# Patient Record
Sex: FEMALE | Race: WHITE | NOT HISPANIC OR LATINO | Employment: OTHER | URBAN - METROPOLITAN AREA
[De-identification: names, ages, dates, MRNs, and addresses within clinical notes are randomized per-mention and may not be internally consistent; named-entity substitution may affect disease eponyms.]

---

## 2019-02-14 ENCOUNTER — APPOINTMENT (INPATIENT)
Dept: RADIOLOGY | Facility: HOSPITAL | Age: 79
DRG: 871 | End: 2019-02-14
Payer: MEDICARE

## 2019-02-14 ENCOUNTER — APPOINTMENT (EMERGENCY)
Dept: RADIOLOGY | Facility: HOSPITAL | Age: 79
DRG: 871 | End: 2019-02-14
Attending: EMERGENCY MEDICINE
Payer: MEDICARE

## 2019-02-14 ENCOUNTER — HOSPITAL ENCOUNTER (INPATIENT)
Facility: HOSPITAL | Age: 79
LOS: 7 days | Discharge: NURSING, REHAB OR LONG-TERM CARE | DRG: 871 | End: 2019-02-21
Attending: EMERGENCY MEDICINE | Admitting: INTERNAL MEDICINE
Payer: MEDICARE

## 2019-02-14 ENCOUNTER — APPOINTMENT (INPATIENT)
Dept: CT IMAGING | Facility: HOSPITAL | Age: 79
DRG: 871 | End: 2019-02-14
Payer: MEDICARE

## 2019-02-14 DIAGNOSIS — J18.9 PNEUMONIA OF RIGHT LOWER LOBE DUE TO INFECTIOUS ORGANISM: Primary | ICD-10-CM

## 2019-02-14 DIAGNOSIS — E87.0 HYPERNATREMIA: ICD-10-CM

## 2019-02-14 DIAGNOSIS — R78.81 BACTEREMIA: ICD-10-CM

## 2019-02-14 DIAGNOSIS — J98.01 BRONCHOSPASM: ICD-10-CM

## 2019-02-14 DIAGNOSIS — A41.9 SEPSIS, DUE TO UNSPECIFIED ORGANISM: ICD-10-CM

## 2019-02-14 DIAGNOSIS — R09.02 HYPOXIA: ICD-10-CM

## 2019-02-14 DIAGNOSIS — L89.322 PRESSURE INJURY OF LEFT BUTTOCK, STAGE 2 (HCC): ICD-10-CM

## 2019-02-14 PROBLEM — R65.20 SEVERE SEPSIS (HCC): Status: ACTIVE | Noted: 2019-02-14

## 2019-02-14 PROBLEM — J96.21 ACUTE ON CHRONIC RESPIRATORY FAILURE WITH HYPOXIA (HCC): Status: ACTIVE | Noted: 2019-02-14

## 2019-02-14 PROBLEM — J44.1 COPD WITH ACUTE EXACERBATION (HCC): Status: ACTIVE | Noted: 2019-02-14

## 2019-02-14 PROBLEM — R13.19 OTHER DYSPHAGIA: Status: ACTIVE | Noted: 2019-02-14

## 2019-02-14 PROBLEM — N28.9 RENAL INSUFFICIENCY: Status: ACTIVE | Noted: 2019-02-14

## 2019-02-14 PROBLEM — E11.8 TYPE 2 DIABETES MELLITUS WITH COMPLICATION, WITHOUT LONG-TERM CURRENT USE OF INSULIN (HCC): Status: ACTIVE | Noted: 2019-02-14

## 2019-02-14 PROBLEM — N17.9 AKI (ACUTE KIDNEY INJURY) (HCC): Status: ACTIVE | Noted: 2019-02-14

## 2019-02-14 LAB
ANION GAP SERPL CALCULATED.3IONS-SCNC: 12 MMOL/L (ref 4–13)
APTT PPP: 26 SECONDS (ref 26–38)
ARTERIAL PATENCY WRIST A: YES
BACTERIA UR QL AUTO: ABNORMAL /HPF
BASE EXCESS BLDA CALC-SCNC: 0.5 MMOL/L (ref -2–3)
BASOPHILS # BLD AUTO: 0 THOUSANDS/ΜL (ref 0–0.1)
BASOPHILS NFR BLD AUTO: 0 % (ref 0–2)
BILIRUB UR QL STRIP: NEGATIVE
BNP SERPL-MCNC: 118 PG/ML (ref 1–100)
BUN SERPL-MCNC: 41 MG/DL (ref 7–25)
CALCIUM SERPL-MCNC: 9.5 MG/DL (ref 8.6–10.5)
CHLORIDE SERPL-SCNC: 110 MMOL/L (ref 98–107)
CLARITY UR: ABNORMAL
CO2 SERPL-SCNC: 27 MMOL/L (ref 21–31)
COLOR UR: YELLOW
CREAT SERPL-MCNC: 1.23 MG/DL (ref 0.6–1.2)
EOSINOPHIL # BLD AUTO: 0.1 THOUSAND/ΜL (ref 0–0.61)
EOSINOPHIL NFR BLD AUTO: 1 % (ref 0–5)
ERYTHROCYTE [DISTWIDTH] IN BLOOD BY AUTOMATED COUNT: 14.2 % (ref 11.5–14.5)
FLUAV AG SPEC QL IA: NEGATIVE
FLUAV AG SPEC QL: NOT DETECTED
FLUBV AG SPEC QL IA: NEGATIVE
FLUBV AG SPEC QL: NOT DETECTED
GFR SERPL CREATININE-BSD FRML MDRD: 42 ML/MIN/1.73SQ M
GLUCOSE SERPL-MCNC: 192 MG/DL (ref 65–99)
GLUCOSE SERPL-MCNC: 268 MG/DL (ref 65–140)
GLUCOSE SERPL-MCNC: 284 MG/DL (ref 65–140)
GLUCOSE UR STRIP-MCNC: NEGATIVE MG/DL
HCO3 BLDA-SCNC: 24 MMOL/L (ref 22–28)
HCT VFR BLD AUTO: 34.5 % (ref 42–47)
HGB BLD-MCNC: 11 G/DL (ref 12–16)
HGB UR QL STRIP.AUTO: ABNORMAL
INR PPP: 1.22 (ref 0.9–1.5)
KETONES UR STRIP-MCNC: NEGATIVE MG/DL
L PNEUMO1 AG UR QL IA.RAPID: NEGATIVE
LACTATE SERPL-SCNC: 1.7 MMOL/L (ref 0.5–2)
LACTATE SERPL-SCNC: 2.5 MMOL/L (ref 0.5–2)
LACTATE SERPL-SCNC: 2.5 MMOL/L (ref 0.5–2)
LEUKOCYTE ESTERASE UR QL STRIP: ABNORMAL
LYMPHOCYTES # BLD AUTO: 1.1 THOUSANDS/ΜL (ref 0.6–4.47)
LYMPHOCYTES NFR BLD AUTO: 9 % (ref 21–51)
MCH RBC QN AUTO: 32.7 PG (ref 26–34)
MCHC RBC AUTO-ENTMCNC: 31.8 G/DL (ref 31–37)
MCV RBC AUTO: 103 FL (ref 81–99)
MONOCYTES # BLD AUTO: 1.1 THOUSAND/ΜL (ref 0.17–1.22)
MONOCYTES NFR BLD AUTO: 9 % (ref 2–12)
NASAL CANNULA: 3.5
NEUTROPHILS # BLD AUTO: 10 THOUSANDS/ΜL (ref 1.4–6.5)
NEUTS SEG NFR BLD AUTO: 81 % (ref 42–75)
NITRITE UR QL STRIP: POSITIVE
NON-SQ EPI CELLS URNS QL MICRO: ABNORMAL /HPF
NRBC BLD AUTO-RTO: 0 /100 WBCS
O2 CT BLDA-SCNC: 15.8 ML/DL
OTHER STN SPEC: ABNORMAL
OXYHGB MFR BLDA: 92.1 % (ref 94–100)
PCO2 BLDA: 36 MM HG (ref 35–45)
PH BLDA: 7.44 [PH] (ref 7.35–7.45)
PH UR STRIP.AUTO: 5.5 [PH] (ref 5–8)
PLATELET # BLD AUTO: 209 THOUSANDS/UL (ref 149–390)
PMV BLD AUTO: 9.7 FL (ref 8.6–11.7)
PO2 BLDA: 64 MM HG (ref 80–100)
POTASSIUM SERPL-SCNC: 4 MMOL/L (ref 3.5–5.5)
PROCALCITONIN SERPL-MCNC: 0.92 NG/ML
PROT UR STRIP-MCNC: NEGATIVE MG/DL
PROTHROMBIN TIME: 14.2 SECONDS (ref 10.2–13)
RBC # BLD AUTO: 3.36 MILLION/UL (ref 3.9–5.2)
RBC #/AREA URNS AUTO: ABNORMAL /HPF
RSV B RNA SPEC QL NAA+PROBE: NOT DETECTED
S PNEUM AG UR QL: NEGATIVE
SODIUM SERPL-SCNC: 149 MMOL/L (ref 134–143)
SP GR UR STRIP.AUTO: 1.02 (ref 1–1.03)
SPECIMEN SOURCE: ABNORMAL
TROPONIN I SERPL-MCNC: 0.04 NG/ML
TROPONIN I SERPL-MCNC: 0.08 NG/ML
URATE CRY URNS QL MICRO: ABNORMAL /HPF
UROBILINOGEN UR QL STRIP.AUTO: 0.2 E.U./DL
WBC # BLD AUTO: 12.3 THOUSAND/UL (ref 4.8–10.8)
WBC #/AREA URNS AUTO: ABNORMAL /HPF

## 2019-02-14 PROCEDURE — 82805 BLOOD GASES W/O2 SATURATION: CPT | Performed by: EMERGENCY MEDICINE

## 2019-02-14 PROCEDURE — 83605 ASSAY OF LACTIC ACID: CPT | Performed by: NURSE PRACTITIONER

## 2019-02-14 PROCEDURE — 84484 ASSAY OF TROPONIN QUANT: CPT | Performed by: NURSE PRACTITIONER

## 2019-02-14 PROCEDURE — 85610 PROTHROMBIN TIME: CPT | Performed by: EMERGENCY MEDICINE

## 2019-02-14 PROCEDURE — 87077 CULTURE AEROBIC IDENTIFY: CPT | Performed by: EMERGENCY MEDICINE

## 2019-02-14 PROCEDURE — 36600 WITHDRAWAL OF ARTERIAL BLOOD: CPT

## 2019-02-14 PROCEDURE — 72220 X-RAY EXAM SACRUM TAILBONE: CPT

## 2019-02-14 PROCEDURE — 87040 BLOOD CULTURE FOR BACTERIA: CPT | Performed by: EMERGENCY MEDICINE

## 2019-02-14 PROCEDURE — 87186 SC STD MICRODIL/AGAR DIL: CPT | Performed by: EMERGENCY MEDICINE

## 2019-02-14 PROCEDURE — 87186 SC STD MICRODIL/AGAR DIL: CPT | Performed by: NURSE PRACTITIONER

## 2019-02-14 PROCEDURE — 71045 X-RAY EXAM CHEST 1 VIEW: CPT

## 2019-02-14 PROCEDURE — 82948 REAGENT STRIP/BLOOD GLUCOSE: CPT

## 2019-02-14 PROCEDURE — 70450 CT HEAD/BRAIN W/O DYE: CPT

## 2019-02-14 PROCEDURE — 83880 ASSAY OF NATRIURETIC PEPTIDE: CPT | Performed by: EMERGENCY MEDICINE

## 2019-02-14 PROCEDURE — 87205 SMEAR GRAM STAIN: CPT | Performed by: NURSE PRACTITIONER

## 2019-02-14 PROCEDURE — 93005 ELECTROCARDIOGRAM TRACING: CPT

## 2019-02-14 PROCEDURE — 96375 TX/PRO/DX INJ NEW DRUG ADDON: CPT

## 2019-02-14 PROCEDURE — 85730 THROMBOPLASTIN TIME PARTIAL: CPT | Performed by: EMERGENCY MEDICINE

## 2019-02-14 PROCEDURE — 87077 CULTURE AEROBIC IDENTIFY: CPT | Performed by: NURSE PRACTITIONER

## 2019-02-14 PROCEDURE — 80048 BASIC METABOLIC PNL TOTAL CA: CPT | Performed by: EMERGENCY MEDICINE

## 2019-02-14 PROCEDURE — 99285 EMERGENCY DEPT VISIT HI MDM: CPT

## 2019-02-14 PROCEDURE — 87449 NOS EACH ORGANISM AG IA: CPT | Performed by: NURSE PRACTITIONER

## 2019-02-14 PROCEDURE — 87070 CULTURE OTHR SPECIMN AEROBIC: CPT | Performed by: NURSE PRACTITIONER

## 2019-02-14 PROCEDURE — 87631 RESP VIRUS 3-5 TARGETS: CPT | Performed by: EMERGENCY MEDICINE

## 2019-02-14 PROCEDURE — 84484 ASSAY OF TROPONIN QUANT: CPT | Performed by: EMERGENCY MEDICINE

## 2019-02-14 PROCEDURE — 83605 ASSAY OF LACTIC ACID: CPT | Performed by: EMERGENCY MEDICINE

## 2019-02-14 PROCEDURE — 96365 THER/PROPH/DIAG IV INF INIT: CPT

## 2019-02-14 PROCEDURE — 36415 COLL VENOUS BLD VENIPUNCTURE: CPT | Performed by: EMERGENCY MEDICINE

## 2019-02-14 PROCEDURE — 85025 COMPLETE CBC W/AUTO DIFF WBC: CPT | Performed by: EMERGENCY MEDICINE

## 2019-02-14 PROCEDURE — 87086 URINE CULTURE/COLONY COUNT: CPT | Performed by: NURSE PRACTITIONER

## 2019-02-14 PROCEDURE — 81001 URINALYSIS AUTO W/SCOPE: CPT | Performed by: NURSE PRACTITIONER

## 2019-02-14 PROCEDURE — 94640 AIRWAY INHALATION TREATMENT: CPT

## 2019-02-14 PROCEDURE — 94760 N-INVAS EAR/PLS OXIMETRY 1: CPT

## 2019-02-14 PROCEDURE — 87081 CULTURE SCREEN ONLY: CPT | Performed by: NURSE PRACTITIONER

## 2019-02-14 PROCEDURE — 87147 CULTURE TYPE IMMUNOLOGIC: CPT | Performed by: NURSE PRACTITIONER

## 2019-02-14 PROCEDURE — 84145 PROCALCITONIN (PCT): CPT | Performed by: NURSE PRACTITIONER

## 2019-02-14 PROCEDURE — 99223 1ST HOSP IP/OBS HIGH 75: CPT | Performed by: HOSPITALIST

## 2019-02-14 RX ORDER — AMMONIUM LACTATE 12 G/100G
CREAM TOPICAL 2 TIMES DAILY PRN
Status: DISCONTINUED | OUTPATIENT
Start: 2019-02-14 | End: 2019-02-14 | Stop reason: CLARIF

## 2019-02-14 RX ORDER — INSULIN GLARGINE 100 [IU]/ML
50 INJECTION, SOLUTION SUBCUTANEOUS
COMMUNITY
End: 2019-03-13 | Stop reason: HOSPADM

## 2019-02-14 RX ORDER — HEPARIN SODIUM 5000 [USP'U]/ML
5000 INJECTION, SOLUTION INTRAVENOUS; SUBCUTANEOUS EVERY 8 HOURS SCHEDULED
Status: DISCONTINUED | OUTPATIENT
Start: 2019-02-14 | End: 2019-02-21 | Stop reason: HOSPADM

## 2019-02-14 RX ORDER — ISOSORBIDE DINITRATE 10 MG/1
30 TABLET ORAL
Status: DISCONTINUED | OUTPATIENT
Start: 2019-02-15 | End: 2019-02-21 | Stop reason: HOSPADM

## 2019-02-14 RX ORDER — METHYLPREDNISOLONE SODIUM SUCCINATE 125 MG/2ML
125 INJECTION, POWDER, LYOPHILIZED, FOR SOLUTION INTRAMUSCULAR; INTRAVENOUS ONCE
Status: COMPLETED | OUTPATIENT
Start: 2019-02-14 | End: 2019-02-14

## 2019-02-14 RX ORDER — ALBUTEROL SULFATE 2.5 MG/3ML
2.5 SOLUTION RESPIRATORY (INHALATION) EVERY 6 HOURS PRN
COMMUNITY

## 2019-02-14 RX ORDER — AMMONIUM LACTATE 12 G/100G
CREAM TOPICAL AS NEEDED
COMMUNITY
End: 2019-03-13 | Stop reason: HOSPADM

## 2019-02-14 RX ORDER — ASPIRIN 81 MG/1
81 TABLET, CHEWABLE ORAL ONCE
Status: COMPLETED | OUTPATIENT
Start: 2019-02-14 | End: 2019-02-14

## 2019-02-14 RX ORDER — LANOLIN ALCOHOL/MO/W.PET/CERES
325 CREAM (GRAM) TOPICAL
COMMUNITY
End: 2019-03-13 | Stop reason: HOSPADM

## 2019-02-14 RX ORDER — LORAZEPAM 0.5 MG/1
0.5 TABLET ORAL EVERY 8 HOURS PRN
Status: DISCONTINUED | OUTPATIENT
Start: 2019-02-14 | End: 2019-02-21 | Stop reason: HOSPADM

## 2019-02-14 RX ORDER — ATORVASTATIN CALCIUM 10 MG/1
10 TABLET, FILM COATED ORAL DAILY
Status: DISCONTINUED | OUTPATIENT
Start: 2019-02-14 | End: 2019-02-21 | Stop reason: HOSPADM

## 2019-02-14 RX ORDER — ALBUTEROL SULFATE 2.5 MG/3ML
2.5 SOLUTION RESPIRATORY (INHALATION)
Status: DISCONTINUED | OUTPATIENT
Start: 2019-02-14 | End: 2019-02-14

## 2019-02-14 RX ORDER — ALBUTEROL SULFATE 2.5 MG/3ML
2.5 SOLUTION RESPIRATORY (INHALATION) EVERY 4 HOURS PRN
Status: DISCONTINUED | OUTPATIENT
Start: 2019-02-14 | End: 2019-02-21 | Stop reason: HOSPADM

## 2019-02-14 RX ORDER — FERROUS SULFATE 325(65) MG
325 TABLET ORAL
Status: DISCONTINUED | OUTPATIENT
Start: 2019-02-14 | End: 2019-02-21 | Stop reason: HOSPADM

## 2019-02-14 RX ORDER — FUROSEMIDE 20 MG/1
20 TABLET ORAL 2 TIMES DAILY
COMMUNITY
End: 2019-02-21 | Stop reason: HOSPADM

## 2019-02-14 RX ORDER — SERTRALINE HYDROCHLORIDE 25 MG/1
50 TABLET, FILM COATED ORAL DAILY
Status: DISCONTINUED | OUTPATIENT
Start: 2019-02-14 | End: 2019-02-21 | Stop reason: HOSPADM

## 2019-02-14 RX ORDER — ALBUTEROL SULFATE 2.5 MG/3ML
5 SOLUTION RESPIRATORY (INHALATION) ONCE
Status: COMPLETED | OUTPATIENT
Start: 2019-02-14 | End: 2019-02-14

## 2019-02-14 RX ORDER — INSULIN GLARGINE 100 [IU]/ML
46 INJECTION, SOLUTION SUBCUTANEOUS
Status: DISCONTINUED | OUTPATIENT
Start: 2019-02-14 | End: 2019-02-14

## 2019-02-14 RX ORDER — ASPIRIN 81 MG/1
81 TABLET ORAL DAILY
COMMUNITY
End: 2019-03-13 | Stop reason: HOSPADM

## 2019-02-14 RX ORDER — AMMONIUM LACTATE 12 G/100G
LOTION TOPICAL 2 TIMES DAILY PRN
Status: DISCONTINUED | OUTPATIENT
Start: 2019-02-14 | End: 2019-02-21 | Stop reason: HOSPADM

## 2019-02-14 RX ORDER — BUTALBITAL/ASPIRIN/CAFFEINE 50-325-40
1 CAPSULE ORAL EVERY 4 HOURS PRN
COMMUNITY
End: 2019-03-13 | Stop reason: HOSPADM

## 2019-02-14 RX ORDER — AMLODIPINE BESYLATE 5 MG/1
10 TABLET ORAL DAILY
Status: DISCONTINUED | OUTPATIENT
Start: 2019-02-15 | End: 2019-02-21 | Stop reason: HOSPADM

## 2019-02-14 RX ORDER — METHYLPREDNISOLONE SODIUM SUCCINATE 40 MG/ML
40 INJECTION, POWDER, LYOPHILIZED, FOR SOLUTION INTRAMUSCULAR; INTRAVENOUS EVERY 8 HOURS SCHEDULED
Status: DISCONTINUED | OUTPATIENT
Start: 2019-02-14 | End: 2019-02-15

## 2019-02-14 RX ORDER — LISINOPRIL 30 MG/1
30 TABLET ORAL DAILY
COMMUNITY
End: 2019-02-21 | Stop reason: HOSPADM

## 2019-02-14 RX ORDER — MEGESTROL ACETATE 40 MG/ML
20 SUSPENSION ORAL DAILY
COMMUNITY

## 2019-02-14 RX ORDER — GABAPENTIN 100 MG/1
100 CAPSULE ORAL 2 TIMES DAILY
Status: DISCONTINUED | OUTPATIENT
Start: 2019-02-14 | End: 2019-02-21 | Stop reason: HOSPADM

## 2019-02-14 RX ORDER — MEGESTROL ACETATE 40 MG/ML
200 SUSPENSION ORAL DAILY
Status: DISCONTINUED | OUTPATIENT
Start: 2019-02-14 | End: 2019-02-21 | Stop reason: HOSPADM

## 2019-02-14 RX ORDER — SENNOSIDES 8.6 MG
1 TABLET ORAL DAILY
Status: DISCONTINUED | OUTPATIENT
Start: 2019-02-14 | End: 2019-02-21 | Stop reason: HOSPADM

## 2019-02-14 RX ORDER — LORAZEPAM 0.5 MG/1
TABLET ORAL EVERY 8 HOURS PRN
COMMUNITY
End: 2019-03-13 | Stop reason: HOSPADM

## 2019-02-14 RX ORDER — ISOSORBIDE DINITRATE 30 MG/1
30 TABLET ORAL 4 TIMES DAILY
COMMUNITY
End: 2019-03-13 | Stop reason: HOSPADM

## 2019-02-14 RX ORDER — GABAPENTIN 100 MG/1
100 CAPSULE ORAL 3 TIMES DAILY
COMMUNITY
End: 2019-03-13 | Stop reason: HOSPADM

## 2019-02-14 RX ORDER — ACETAMINOPHEN 325 MG/1
650 TABLET ORAL EVERY 6 HOURS PRN
Status: DISCONTINUED | OUTPATIENT
Start: 2019-02-14 | End: 2019-02-21 | Stop reason: HOSPADM

## 2019-02-14 RX ORDER — AMLODIPINE BESYLATE 10 MG/1
10 TABLET ORAL DAILY
COMMUNITY
End: 2019-03-13 | Stop reason: HOSPADM

## 2019-02-14 RX ORDER — ASPIRIN 81 MG/1
81 TABLET ORAL DAILY
Status: DISCONTINUED | OUTPATIENT
Start: 2019-02-14 | End: 2019-02-21 | Stop reason: HOSPADM

## 2019-02-14 RX ORDER — POLYETHYLENE GLYCOL 3350 17 G/17G
17 POWDER, FOR SOLUTION ORAL DAILY
Status: DISCONTINUED | OUTPATIENT
Start: 2019-02-14 | End: 2019-02-16 | Stop reason: CLARIF

## 2019-02-14 RX ORDER — POLYETHYLENE GLYCOL 3350 17 G/17G
17 POWDER, FOR SOLUTION ORAL DAILY
COMMUNITY
End: 2019-02-21 | Stop reason: HOSPADM

## 2019-02-14 RX ORDER — ATORVASTATIN CALCIUM 10 MG/1
10 TABLET, FILM COATED ORAL DAILY
COMMUNITY
End: 2019-03-13 | Stop reason: HOSPADM

## 2019-02-14 RX ORDER — FAMOTIDINE 20 MG/1
20 TABLET, FILM COATED ORAL 2 TIMES DAILY
COMMUNITY
End: 2019-03-13 | Stop reason: HOSPADM

## 2019-02-14 RX ORDER — HYDROCODONE BITARTRATE AND ACETAMINOPHEN 5; 325 MG/1; MG/1
1 TABLET ORAL EVERY 6 HOURS PRN
COMMUNITY
End: 2019-02-21 | Stop reason: HOSPADM

## 2019-02-14 RX ORDER — SENNA PLUS 8.6 MG/1
1 TABLET ORAL DAILY
COMMUNITY

## 2019-02-14 RX ORDER — NICOTINE POLACRILEX 4 MG
15 LOZENGE BUCCAL ONCE
COMMUNITY
End: 2019-02-21 | Stop reason: HOSPADM

## 2019-02-14 RX ORDER — SODIUM CHLORIDE 9 MG/ML
125 INJECTION, SOLUTION INTRAVENOUS CONTINUOUS
Status: DISCONTINUED | OUTPATIENT
Start: 2019-02-14 | End: 2019-02-15

## 2019-02-14 RX ORDER — ACETAMINOPHEN 325 MG/1
650 TABLET ORAL EVERY 6 HOURS PRN
COMMUNITY

## 2019-02-14 RX ORDER — FAMOTIDINE 20 MG/1
20 TABLET, FILM COATED ORAL 2 TIMES DAILY
Status: DISCONTINUED | OUTPATIENT
Start: 2019-02-14 | End: 2019-02-16

## 2019-02-14 RX ORDER — DIPHENOXYLATE HYDROCHLORIDE AND ATROPINE SULFATE 2.5; .025 MG/1; MG/1
1 TABLET ORAL DAILY
COMMUNITY

## 2019-02-14 RX ORDER — HYDROCODONE BITARTRATE AND ACETAMINOPHEN 5; 325 MG/1; MG/1
1 TABLET ORAL EVERY 6 HOURS PRN
Status: DISCONTINUED | OUTPATIENT
Start: 2019-02-14 | End: 2019-02-21 | Stop reason: HOSPADM

## 2019-02-14 RX ADMIN — Medication: at 12:58

## 2019-02-14 RX ADMIN — PIPERACILLIN SODIUM AND TAZOBACTAM SODIUM 2.25 G: 2; .25 INJECTION, POWDER, LYOPHILIZED, FOR SOLUTION INTRAVENOUS at 21:50

## 2019-02-14 RX ADMIN — VANCOMYCIN HYDROCHLORIDE 750 MG: 1 INJECTION, POWDER, LYOPHILIZED, FOR SOLUTION INTRAVENOUS at 22:50

## 2019-02-14 RX ADMIN — SODIUM CHLORIDE 125 ML/HR: 9 INJECTION, SOLUTION INTRAVENOUS at 12:30

## 2019-02-14 RX ADMIN — METHYLPREDNISOLONE SODIUM SUCCINATE 125 MG: 125 INJECTION, POWDER, FOR SOLUTION INTRAMUSCULAR; INTRAVENOUS at 08:20

## 2019-02-14 RX ADMIN — ASPIRIN 81 MG 81 MG: 81 TABLET ORAL at 09:12

## 2019-02-14 RX ADMIN — INSULIN LISPRO 3 UNITS: 100 INJECTION, SOLUTION INTRAVENOUS; SUBCUTANEOUS at 13:05

## 2019-02-14 RX ADMIN — INSULIN LISPRO 2 UNITS: 100 INJECTION, SOLUTION INTRAVENOUS; SUBCUTANEOUS at 22:01

## 2019-02-14 RX ADMIN — ANORECTAL OINTMENT 1 APPLICATION: 15.7; .44; 24; 20.6 OINTMENT TOPICAL at 12:58

## 2019-02-14 RX ADMIN — ALBUTEROL SULFATE 5 MG: 2.5 SOLUTION RESPIRATORY (INHALATION) at 08:13

## 2019-02-14 RX ADMIN — HEPARIN SODIUM 5000 UNITS: 5000 INJECTION INTRAVENOUS; SUBCUTANEOUS at 22:21

## 2019-02-14 RX ADMIN — HEPARIN SODIUM 5000 UNITS: 5000 INJECTION INTRAVENOUS; SUBCUTANEOUS at 13:30

## 2019-02-14 RX ADMIN — METHYLPREDNISOLONE SODIUM SUCCINATE 40 MG: 40 INJECTION, POWDER, FOR SOLUTION INTRAMUSCULAR; INTRAVENOUS at 22:21

## 2019-02-14 RX ADMIN — VANCOMYCIN HYDROCHLORIDE 1250 MG: 1 INJECTION, POWDER, LYOPHILIZED, FOR SOLUTION INTRAVENOUS at 08:24

## 2019-02-14 RX ADMIN — SODIUM CHLORIDE 125 ML/HR: 9 INJECTION, SOLUTION INTRAVENOUS at 21:49

## 2019-02-14 RX ADMIN — PIPERACILLIN SODIUM AND TAZOBACTAM SODIUM 3.38 G: 3; .375 INJECTION, POWDER, LYOPHILIZED, FOR SOLUTION INTRAVENOUS at 08:41

## 2019-02-14 RX ADMIN — TIOTROPIUM BROMIDE 18 MCG: 18 CAPSULE ORAL; RESPIRATORY (INHALATION) at 12:59

## 2019-02-14 RX ADMIN — PIPERACILLIN SODIUM AND TAZOBACTAM SODIUM 2.25 G: 2; .25 INJECTION, POWDER, LYOPHILIZED, FOR SOLUTION INTRAVENOUS at 14:10

## 2019-02-14 RX ADMIN — SODIUM CHLORIDE 1000 ML: 0.9 INJECTION, SOLUTION INTRAVENOUS at 08:23

## 2019-02-14 RX ADMIN — ANORECTAL OINTMENT 1 APPLICATION: 15.7; .44; 24; 20.6 OINTMENT TOPICAL at 17:29

## 2019-02-14 NOTE — RESPIRATORY THERAPY NOTE
RT Protocol Note  Marii Bejarano 66 y o  female MRN: [id number]  Unit/Bed#: ICU 04 Encounter: [id number]    Assessment    Principal Problem:    Severe sepsis ([facility] )  Active Problems:    Acute on chronic respiratory failure with hypoxia ([facility])    Pneumonia of right lower lobe due to infectious organism ([facility] )    Hypernatremia    Renal insufficiency    COPD with acute exacerbation ([facility])    Type 2 diabetes mellitus with complication, without long-term current use of insulin ([facility])    Pressure ulcer of left buttock, stage 2      Home Pulmonary Medications:  Home Devices/Therapy: Other (Comment)(Albuterol neb prn)    Past Medical History:   Diagnosis Date    Anemia     CHF (congestive heart failure) ([facility])     COPD (chronic obstructive pulmonary disease) ([facility])     home 02 2l    Diabetes mellitus ([facility])     GERD (gastroesophageal reflux disease)     Hyperlipidemia     Hypertension     Stroke ([place] [address] )      Social History     Socioeconomic History    Marital status: Unknown     Spouse name: None    Number of children: None    Years of education: None    Highest education level: None   Occupational History    None   Social Needs    Financial resource strain: None    Food insecurity:     Worry: None     Inability: None    Transportation needs:     Medical: None     Non-medical: None   Tobacco Use    Smoking status: Never Smoker    Smokeless tobacco: Never Used   Substance and Sexual Activity    Alcohol use: Never     Frequency: Never    Drug use: Never    Sexual activity: Not Currently   Lifestyle    Physical activity:     Days per week: None     Minutes per session: None    Stress: None   Relationships    Social connections:     Talks on phone: None     Gets together: None     Attends Protestant service: None     Active member of club or organization: None     Attends meetings of clubs or organizations: None     Relationship status: None    Intimate partner violence:     Fear of current or ex partner: None     Emotionally abused: None     Physically abused: None     Forced sexual activity: None   Other Topics Concern    None   Social History Narrative    None       Subjective         Objective    Physical Exam:   Assessment Type: Assess only  General Appearance: Lethargic  Respiratory Pattern: Normal  Chest Assessment: Chest expansion symmetrical  Bilateral Breath Sounds: Rhonchi    Vitals:  Blood pressure 108/56, pulse (!) 107, temperature 98 1 °F (36 7 °C), temperature source Temporal, resp  rate (!) 25, height 5' 8" (1 727 m), weight 81 2 kg (179 lb), SpO2 95 %, not currently breastfeeding  Results from last 7 days   Lab Units 02/14/19  0822   PH ART  7 440   PCO2 ART mm Hg 36 0   PO2 ART mm Hg 64 0*   HCO3 ART mmol/L 24 0   BASE EXC ART mmol/L 0 5   O2 CONTENT ART mL/dL 15 8   O2 HGB, ARTERIAL % 92 1*   ABG SOURCE  Radial, Right   BETHANY TEST  Yes       Imaging and other studies: I have personally reviewed pertinent reports              Plan    Respiratory Plan: Home Bronchodilator Patient pathway        Resp Comments: pt takes albuterol nebs prn will order same

## 2019-02-14 NOTE — ASSESSMENT & PLAN NOTE
· Present on admission  · Will consult wound care  · Continue with local care with frequent turn and reposition

## 2019-02-14 NOTE — ASSESSMENT & PLAN NOTE
· Acute exacerbation appears to be improving  · Titrate down on steroids  · Continue treatment stated above

## 2019-02-14 NOTE — ASSESSMENT & PLAN NOTE
· Patient meets criteria for sepsis with tachycardia, tachypnea, leukocytosis suspected to be secondary to possible aspiration pneumonia versus healthcare associated pneumonia  · Will continue broad-spectrum antibiotics with vancomycin and Zosyn  · Pending sputum culture, urine culture, and blood cultures  Noted that urine culture was obtained after the patient received 1 dose of vancomycin     · Check procalcitonin  · IV fluid

## 2019-02-14 NOTE — ASSESSMENT & PLAN NOTE
· Patient meets criteria for sepsis with tachycardia, tachypnea, leukocytosis suspected to be secondary to aspiration pneumonia  · We will DC vancomycin, will treat with cefepime and Flagyl as this appears to be related to an aspiration  · Pending sputum culture, urine culture, and blood cultures  Noted that urine culture was obtained after the patient received 1 dose of vancomycin     · Follow-up procalcitonin  · IV fluids

## 2019-02-14 NOTE — ASSESSMENT & PLAN NOTE
· This is related to poor oral intake and acute infection  Patient appears to be volume depleted as well  · Will aggressively treat with IV fluid- will use normal saline for volume depletion  Repeat labs in the a m    · Consult Nephrology

## 2019-02-14 NOTE — ASSESSMENT & PLAN NOTE
· Suspected aspiration pneumonia with history of dysphagia  Possible healthcare associated pneumonia  · Initial chest x-ray does not show any infiltrate however with the patient's clinical presentation pneumonia is highly suspected    · Will continue treatment stated above  · Pending cultures result  · Will check MRSA  · Rapid influenza a and B- negative pending confirmation test   · Check urine for Legionella and strep  · Check Sputum culture

## 2019-02-14 NOTE — H&P
H&P- Corey Bejarano 1940, 66 y o  female MRN: [id number]    Unit/Bed#: ICU 04 Encounter: [id number]    Primary Care Provider: Haim Segal DO   Date and time admitted to hospital: 2/14/2019  7:25 AM        * Sepsis [facility])  Assessment & Plan  · Patient meets criteria for sepsis with tachycardia, tachypnea, leukocytosis suspected to be secondary to possible aspiration pneumonia versus healthcare associated pneumonia  · Will continue broad-spectrum antibiotics with vancomycin and Zosyn  · Pending sputum culture, urine culture, and blood cultures  Noted that urine culture was obtained after the patient received 1 dose of vancomycin  · Check procalcitonin  · IV fluid    Pneumonia due to infectious organism  Assessment & Plan  · Suspected aspiration pneumonia with history of dysphagia  Possible healthcare associated pneumonia  · Initial chest x-ray does not show any infiltrate however with the patient's clinical presentation pneumonia is highly suspected  Will repeat cxr later on today  · Will continue treatment stated above  · Pending cultures result  · Will check MRSA  · Rapid influenza a and B- negative pending confirmation test   · Check urine for Legionella and strep  · Check Sputum culture    Acute on chronic respiratory failure with hypoxia ([facility] )  Assessment & Plan  · Suspected to be secondary to acute infection  · The patient uses 2 L oxygen chronically  · Continue with oxygen supplement  · Respiratory protocol  · Continue treatment stated above    COPD with acute exacerbation ([facility])  Assessment & Plan  · Acute exacerbation is secondary to acute infection  · IV Solu-Medrol and neb treatment  · Continue treatment stated above      Other dysphagia  Assessment & Plan  · Consult speech  · Aspiration precautions  · Will keep NPO for now until seen by speech      Pressure ulcer of left buttock, stage 2  Assessment & Plan  · Present on admission  · Will consult wound care  · Continue with local care with frequent turn and reposition    Type 2 diabetes mellitus with complication, without long-term current use of insulin ([facility])  Assessment & Plan  No results found for: HGBA1C    Recent Labs     02/14/19  1149   POCGLU 268*       Blood Sugar Average: Last 72 hrs:  (P) 268   · Will hold off on Lantus at this time as the patient is being kept NPO  · Will place on insulin sliding scale  · Will monitor closely as she will be on Solu-Medrol  Hypernatremia  Assessment & Plan  · This is related to poor oral intake and acute infection  Patient appears to be volume depleted as well  · Will aggressively treat with IV fluid- will use normal saline for volume depletion  Repeat labs in the a m  · Consult Nephrology    VTE Prophylaxis: Heparin  Code Status: Full Code- according to POA  POLST: There is no POLST form on file for this patient (pre-hospital)  Discussion with family: Daughter- Silvia Hernandez [phone]; spoke with Sean Isaacs [phone]     Anticipated Length of Stay:  Patient will be admitted on an Inpatient basis with an anticipated length of stay of  > 2 midnights  Justification for Hospital Stay: severe sepsis     Total Time for Visit, including Counseling / Coordination of Care: 45 minutes  Greater than 50% of this total time spent on direct patient counseling and coordination of care  Chief Complaint:   Shortness of breath     History of Present Illness:    Kaushik Lance is a 66 y o  female who was brought from [facility] [address] after she was found dyspneic with RR 40s and hypoxic with pulse ox of 84% on 2L  Patient is a poor historian and only answers certain questions  Most information was obtained from [facility] RN staff  According to the staff, patient received a neb treatment and pulse ox slightly improved to 90%  Subsequently, she was sent to ED for further evaluation   Staff states that patient appears to be in her normal state of health up until this AM  Patient states having pain on her buttocks  She denies chest pain or palpitation, nausea, vomiting, abdominal pain  She was unable to answer her full name, date of birth, or where she is  Of note, patient has history of CVA in 2013 and has been a resident of [facility] since  Patient mental status is wax and wane with period of "not responding with blank stare"  At times, patient would be able to carry on a conversation with staff  Patient only moves her upper extremities  She is a danya lift OOB with very minimal movement of her legs for the past year  Patient is incontinent of urine and bowel  Currently on pureed consistency and honey thick liquid  Patient takes medications crushed with apple sauce/pudding  Review of Systems:  Review of Systems   Unable to perform ROS: Other       Past Medical and Surgical History:   Past Medical History:   Diagnosis Date    Anemia     [facility]     COPD (chronic obstructive pulmonary disease) ([facility] )     home 02 2l    Diabetes mellitus ([facility] )     DJD (degenerative joint disease)     Gait abnormality     GERD (gastroesophageal reflux disease)     Hyperlipidemia     Hypertension     Stroke [facility])        Past Surgical History:   Procedure Laterality Date    ABDOMINAL SURGERY         Meds/Allergies:  Prior to Admission medications    Medication Sig Start Date End Date Taking?  Authorizing Provider   acetaminophen (TYLENOL) 325 mg tablet Take 650 mg by mouth every 6 (six) hours as needed for mild pain    Historical Provider, MD   albuterol (2 5 mg/3 mL) 0 083 % nebulizer solution Take 2 5 mg by nebulization every 6 (six) hours as needed for wheezing or shortness of breath    Historical Provider, MD   amLODIPine (NORVASC) 10 mg tablet Take 10 mg by mouth daily    Historical Provider, MD   ammonium lactate (LAC-HYDRIN) 12 % cream Apply topically as needed for dry skin    Historical Provider, MD   aspirin (ECOTRIN LOW STRENGTH) 81 mg EC tablet Take 81 mg by mouth daily    Historical Provider, MD   atorvastatin (LIPITOR) 10 mg tablet Take 10 mg by mouth daily    Historical Provider, MD   bisacodyl (DULCOLAX) 5 mg EC tablet Take 10 mg by mouth daily as needed for constipation    Historical Provider, MD   butalbital-acetaminophen-caffeine-codeine (FIORICET WITH CODEINE) -06-30 MG per capsule Take 1 capsule by mouth every 4 (four) hours as needed for headaches    Historical Provider, MD   dimethicone 1 % cream Apply topically 2 (two) times a day as needed for dry skin    Historical Provider, MD   famotidine (PEPCID) 20 mg tablet Take 20 mg by mouth 2 (two) times a day    Historical Provider, MD   ferrous sulfate 325 (65 FE) MG EC tablet Take 325 mg by mouth 3 (three) times a day with meals    Historical Provider, MD   furosemide (LASIX) 20 mg tablet Take 20 mg by mouth 2 (two) times a day    Historical Provider, MD   gabapentin (NEURONTIN) 100 mg capsule Take 100 mg by mouth 2 (two) times a day    Historical Provider, MD   glucagon (GLUCAGON EMERGENCY) 1 MG injection Inject 1 mg under the skin once as needed for low blood sugar    Historical Provider, MD   glucose 40 % Take 15 g by mouth once    Historical Provider, MD   HYDROcodone-acetaminophen (NORCO) 5-325 mg per tablet Take 1 tablet by mouth every 6 (six) hours as needed for pain    Historical Provider, MD   insulin aspart (NovoLOG) 100 units/mL injection Inject under the skin 3 (three) times a day before meals    Historical Provider, MD   insulin glargine (LANTUS) 100 units/mL subcutaneous injection Inject 46 Units under the skin daily at bedtime    Historical Provider, MD   isosorbide dinitrate (ISORDIL) 30 mg tablet Take 30 mg by mouth 4 (four) times a day    Historical Provider, MD   lisinopril (ZESTRIL) 30 mg tablet Take 30 mg by mouth daily    Historical Provider, MD   LORazepam (ATIVAN) 0 5 mg tablet Take by mouth every 8 (eight) hours as needed for anxiety    Historical Provider, MD magnesium hydroxide (MILK OF MAGNESIA) 400 mg/5 mL oral suspension Take 15 mL by mouth daily as needed for constipation    Historical Provider, MD   megestrol (MEGACE) 40 MG/ML suspension Take 200 mg by mouth daily    Historical Provider, MD   menthol-cetylpyridinium (CEPACOL) 3 MG lozenge Take 1 lozenge by mouth as needed for sore throat    Historical Provider, MD   menthol-zinc oxide (CALMOSEPTINE) 0 44-20 6 % OINT Apply topically 2 (two) times a day    Historical Provider, MD   metFORMIN (GLUCOPHAGE) 500 mg tablet Take 500 mg by mouth 2 (two) times a day with meals    Historical Provider, MD   metoprolol tartrate (LOPRESSOR) 25 mg tablet Take 25 mg by mouth every 12 (twelve) hours    Historical Provider, MD   multivitamin (THERAGRAN) TABS Take 1 tablet by mouth daily    Historical Provider, MD   polyethylene glycol (GLYCOLAX) powder Take 17 g by mouth daily    Historical Provider, MD   pseudoephedrine-acetaminophen (TYLENOL SINUS)  MG TABS Take 1 tablet by mouth every 4 (four) hours as needed for congestion    Historical Provider, MD   senna (SENOKOT) 8 6 MG tablet Take 1 tablet by mouth daily    Historical Provider, MD   sertraline (ZOLOFT) 50 mg tablet Take 50 mg by mouth daily    Historical Provider, MD   tiotropium (SPIRIVA) 18 mcg inhalation capsule Place 18 mcg into inhaler and inhale daily    Historical Provider, MD   TURMERIC PO Take by mouth    Historical Provider, MD     I have reveiwed home medications using records provided by [facility]  Allergies:    Allergies   Allergen Reactions    Codeine     Demerol [Meperidine]     Nsaids     Protonix [Pantoprazole]        Social History:  Marital Status: Unknown   Occupation: retired   Patient Pre-hospital Living Situation:  Skilled nursing facility  Patient Pre-hospital Level of Mobility:  Bed bound  Patient Pre-hospital Diet Restrictions:  Diabetic, pureed consistency with honey thick liquid  Substance Use History:     Social History     Substance and Sexual Activity   Alcohol Use Never    Frequency: Never     Social History     Tobacco Use   Smoking Status Never Smoker   Smokeless Tobacco Never Used     Social History     Substance and Sexual Activity   Drug Use Never       Family History:  I have reviewed the patients family history    Physical Exam:   Vitals:   Blood Pressure: 108/56 (19 1100)  Pulse: (!) 107 (19 1100)  Temperature: 98 1 °F (36 7 °C) (19 1015)  Temp Source: Temporal (19 1015)  Respirations: (!) 25 (19 1100)  Height: 5' 8" (172 7 cm) (19 0736)  Weight - Scale: 81 2 kg (179 lb) (19 0736)  SpO2: 95 % (19)    Physical Exam   Constitutional: She appears well-developed  She appears lethargic  No distress  Obese     HENT:   Head: Normocephalic and atraumatic  Mouth/Throat: Oropharynx is clear and moist    Eyes: Pupils are equal, round, and reactive to light  Conjunctivae and EOM are normal    Neck: Normal range of motion  Neck supple  Cardiovascular: Regular rhythm  Exam reveals no gallop and no friction rub  Murmur (+2/6 systolic murmur heard best at apex) heard  Tachycardia      Pulmonary/Chest: Effort normal  She has no wheezes  She has rales (fines in bases)  Coarse breath sounds     Abdominal: Soft  Bowel sounds are normal  She exhibits no distension  There is no tenderness  There is no rebound  Musculoskeletal: She exhibits no edema, tenderness or deformity  Neurological: She appears lethargic  No cranial nerve deficit  GCS eye subscore is 4  GCS verbal subscore is 4  GCS motor subscore is 6  Patient is alert but unable to tell me her full name, , year, or where she is  She is able to answer some simple questions  She has a blank stare periodically  No facial droop  Speech is clear (yes/no)   strength both hands +4/5  Upper extremities strength +3/5  Lower extremities strength +2/5  Skin: Skin is warm and dry  No rash noted  No erythema          Stage II pressure ulcer on left sacrum and sacral slit  Psychiatric: She has a normal mood and affect  Vitals reviewed  Additional Data:   Lab Results: I have personally reviewed pertinent reports  Results from last 7 days   Lab Units 02/14/19  0815   WBC Thousand/uL 12 30*   HEMOGLOBIN g/dL 11 0*   HEMATOCRIT % 34 5*   PLATELETS Thousands/uL 209   NEUTROS PCT % 81*   LYMPHS PCT % 9*   MONOS PCT % 9   EOS PCT % 1     Results from last 7 days   Lab Units 02/14/19  0815   POTASSIUM mmol/L 4 0   CHLORIDE mmol/L 110*   CO2 mmol/L 27   BUN mg/dL 41*   CREATININE mg/dL 1 23*   CALCIUM mg/dL 9 5     Results from last 7 days   Lab Units 02/14/19  0815   INR  1 22     Results from last 7 days   Lab Units 02/14/19  1149   POC GLUCOSE mg/dl 268*           Imaging: I have personally reviewed pertinent reports  XR chest 1 view portable   ED Interpretation by Naima Montana MD (02/14 8041)   RLL infiltrate      Final Result by Heather Flynn MD (73/54 1329)      Left lingular scarring/atelectasis  Minimal right base atelectasis  Workstation performed: [id number]         XR sacrum and coccyx    (Results Pending)   XR chest portable    (Results Pending)       EKG, Pathology, and Other Studies Reviewed on Admission:   · EKG: pending     NetAccess/Epic Records Reviewed: Yes     ** Please Note: This note has been constructed using a voice recognition system   **

## 2019-02-14 NOTE — PROGRESS NOTES
Vancomycin Assessment    Taina Donovan is a 66 y o  female who was currently prescribed vancomycin 1250 mg iv q 24 hrs for Pneumonia     Relevant clinical data and objective history reviewed:  Creatinine   Date Value Ref Range Status   02/14/2019 1 23 (H) 0 60 - 1 20 mg/dL Final     Comment:     Standardized to IDMS reference method     /56   Pulse (!) 107   Temp 98 1 °F (36 7 °C) (Temporal)   Resp (!) 25   Ht 5' 8" (1 727 m)   Wt 81 2 kg (179 lb)   LMP  (LMP Unknown)   SpO2 95%   Breastfeeding? No   BMI 27 22 kg/m²   No intake/output data recorded  Lab Results   Component Value Date/Time    BUN 41 (H) 02/14/2019 08:15 AM    WBC 12 30 (H) 02/14/2019 08:15 AM    HGB 11 0 (L) 02/14/2019 08:15 AM    HCT 34 5 (L) 02/14/2019 08:15 AM     (H) 02/14/2019 08:15 AM     02/14/2019 08:15 AM     Temp Readings from Last 3 Encounters:   02/14/19 98 1 °F (36 7 °C) (Temporal)     Vancomycin Days of Therapy: 1    Assessment/Plan  The patient is currently on vancomycin utilizing scheduled dosing based on actual body weight  Baseline risks associated with therapy include: advanced age and dehydration  The patient was currently prescribed 1250 mg iv q 24 hrs and after clinical evaluation will be changed to 750 mg iv q 12 hrs  Pharmacy will also follow closely for s/sx of nephrotoxicity, infusion reactions and appropriateness of therapy  BMP and CBC will be ordered per protocol  Plan for trough as patient approaches steady state, prior to the 5th  dose at approximately 1030 on 2/16/19  Due to infection severity, will target a trough of 15-20 (appropriate for most indications)   Pharmacy will continue to follow the patient?s culture results and clinical progress daily      Татьяна Knight, Pharmacist

## 2019-02-14 NOTE — ASSESSMENT & PLAN NOTE
No results found for: HGBA1C    Recent Labs     02/14/19  1149   POCGLU 268*       Blood Sugar Average: Last 72 hrs:  (P) 268   · Will hold off on Lantus at this time as the patient is being kept NPO  · Will place on insulin sliding scale  · Will monitor closely as she will be on Solu-Medrol

## 2019-02-14 NOTE — ASSESSMENT & PLAN NOTE
· Acute exacerbation is secondary to acute infection  · IV Solu-Medrol and neb treatment  · Continue treatment stated above

## 2019-02-14 NOTE — ASSESSMENT & PLAN NOTE
· Suspected aspiration pneumonia with history of CVA and dysphagia    · We will treat with cefepime and Flagyl  · Patient is currently NPO due to aspiration  · Speech therapy is recommending PEG tube placement versus hospice in light of significant aspiration  · All of these were conveyed to the patient's daughter Yesy Hagan cell phone number [phone], home phone number [phone]  · She would like to think about her options, and will be coming up this weekend to see her mother at which time she will make a decision regarding PEG tube versus hospice  · Otherwise continue with above-mentioned medical treatment

## 2019-02-14 NOTE — ASSESSMENT & PLAN NOTE
· Appreciate speech input  · Keep NPO  · Aspiration precautions  · Decision regarding PEG tube versus hospice pending daughters decision as noted above

## 2019-02-14 NOTE — CONSULTS
REASON FOR CONSULTATION:   Sacral and left buttock open wounds  HPI: Kaushik Lance is a 66y o  year old female who was admitted from owning [address] to intensive care unit with severe sepsis due to pneumonia  Patient was noticed to have sacral area and left buttock open wound for which wound Care consultation was called in  Seemingly patient is in moderate to severe pain due to those open wounds  Patient does have stool and urinary incontinence  Patient's appetite seems to be fair  Patient is nonambulatory  Patient is currently on Zosyn IV along with vancomycin for pneumonia  Review of Systems   Musculoskeletal: Positive for back pain and gait problem  Skin: Positive for wound  All other systems reviewed and are negative        Historical Information   Past Medical History:   Diagnosis Date    Anemia     [facility]estive [facility] failure) [facility]     COPD (chronic obstructive pulmonary disease) ([facility])     home 02 2l    Diabetes mellitus ([facility])     DJD (degenerative joint disease)     Gait abnormality     GERD (gastroesophageal reflux disease)     Hyperlipidemia     Hypertension     Stroke [facility])      Past Surgical History:   Procedure Laterality Date    ABDOMINAL SURGERY       Social History   Social History     Substance and Sexual Activity   Alcohol Use Never    Frequency: Never     Social History     Substance and Sexual Activity   Drug Use Never     Social History     Tobacco Use   Smoking Status Never Smoker   Smokeless Tobacco Never Used     Family History: non-contributory    Meds/Allergies   all current active meds have been reviewed  Allergies   Allergen Reactions    Codeine     Demerol [Meperidine]     Nsaids     Protonix [Pantoprazole]        Social History:   Social History     Socioeconomic History    Marital status: Unknown     Spouse name: None    Number of children: None    Years of education: None    Highest education level: None Occupational History    None   Social Needs    Financial resource strain: None    Food insecurity:     Worry: None     Inability: None    Transportation needs:     Medical: None     Non-medical: None   Tobacco Use    Smoking status: Never Smoker    Smokeless tobacco: Never Used   Substance and Sexual Activity    Alcohol use: Never     Frequency: Never    Drug use: Never    Sexual activity: Not Currently   Lifestyle    Physical activity:     Days per week: None     Minutes per session: None    Stress: None   Relationships    Social connections:     Talks on phone: None     Gets together: None     Attends [employer] service: None     Active member of club or organization: None     Attends meetings of clubs or organizations: None     Relationship status: None    Intimate partner violence:     Fear of current or ex partner: None     Emotionally abused: None     Physically abused: None     Forced sexual activity: None   Other Topics Concern    None   Social History Narrative    None       Family History:   Family History   Family history unknown: Yes         Objective   Vitals: Blood pressure 108/56, pulse (!) 107, temperature 98 1 °F (36 7 °C), temperature source Temporal, resp  rate (!) 25, height 5' 8" (1 727 m), weight 81 2 kg (179 lb), SpO2 95 %, not currently breastfeeding  Intake/Output Summary (Last 24 hours) at 2/14/2019 1354  Last data filed at 2/14/2019 1018  Gross per 24 hour   Intake 1466 73 ml   Output    Net 1466 73 ml     Invasive Devices     Peripheral Intravenous Line            Peripheral IV 02/14/19 Left Antecubital less than 1 day    Peripheral IV 02/14/19 Right Antecubital less than 1 day          Drain            Urethral Catheter Non-latex 16 Fr  less than 1 day                Physical Exam   Constitutional: She is oriented to person, place, and time  She appears well-developed and well-nourished  HENT:   Head: Normocephalic and atraumatic     Eyes: Pupils are equal, round, and reactive to light  EOM are normal    Neck: Normal range of motion  Neck supple  Cardiovascular: Normal rate, regular rhythm and normal heart sounds  Pulmonary/Chest: Effort normal and breath sounds normal    Abdominal: Soft  Bowel sounds are normal    Musculoskeletal: Normal range of motion  Neurological: She is alert and oriented to person, place, and time  Skin: Skin is warm and dry  Midline sacral area open wound with dimensions:  4 0 cm x 1 2 cm x 0 2 cm, mild serous discharge, no Odor, wound base with granulation and epithelial tissue, mild tenderness on palpation around the ethel wound area skin  Left buttock open wound with dimensions:  2 2 cm x 1 2 cm x 0 1 cm   Psychiatric: She has a normal mood and affect  IMPRESSION:  1  Stage 2-3 midline sacral area pressure ulcer  2  Stage II left buttock pressure ulcer     RECOMMENDATIONS:   1  Wound culture has been taken from sacral area open wound for Gram stain and culture and sensitivity  2  I will use Khadra + saline drops + ALLEVYN   Nayeli Trevon Change dressing every other day and on as-needed basis  3  Please reposition the patient every 2 hr and arrange pressure relieving mattress for the patient  Thank you for consulting me to take part in the care of this patient  Code Status: Level 1 - Full Code  Advance Directive and Living Will:    Patient's daughter Mitch Keita is power of  and her phone number is [phone]  Reportedly patient also has a living will as per that patient wants full resuscitation except 2 feedings  I called earlier patient's son CASEY and he gave all this information  I was not able to get patient's daughter over the phone  Power of :   Patient's daughter Mitch Keita is power of  and her phone number is [phone]  Counseling / Coordination of Care  Total floor time spent today 50 minutes   Greater than 50% of total time was spent with the patient and / or family counseling and / or coordination of care

## 2019-02-14 NOTE — ASSESSMENT & PLAN NOTE
· Suspected to be secondary to acute infection  · The patient uses 2 L oxygen chronically  · Continue with oxygen supplement  · Respiratory protocol  · Continue treatment stated above

## 2019-02-14 NOTE — ASSESSMENT & PLAN NOTE
· Suspected to be secondary to acute infection  · The patient uses 2 L oxygen chronically  · Improving  · Respiratory protocol  · Continue treatment stated above

## 2019-02-14 NOTE — ASSESSMENT & PLAN NOTE
No results found for: HGBA1C    Recent Labs     02/14/19  1149   POCGLU 268*       Blood Sugar Average: Last 72 hrs:  (P) 268   · Resume Lantus albeit at a much lower dose given the patient is NPO  · Will place on insulin sliding scale  · Will monitor closely as she will be on Solu-Medrol

## 2019-02-14 NOTE — OCCUPATIONAL THERAPY NOTE
OT Eval request received/noted  Pt newly admitted to ICU this date  Spoke to nsg  To Hold OT eval at this time r/t present status  Will complete as appropriate    Mel Souza, OT

## 2019-02-14 NOTE — ED PROVIDER NOTES
History  Chief Complaint   Patient presents with    Shortness of Breath     From [address] [facility] for Shortness of Breath starting this morning  Patient inital SpO2 was 80 on 2 L chronic      27-year-old female sent from nursing home shortness of breath, cough and hypoxia  Patient denies pain or nausea  Unable to obtain further history secondary to dementia      History provided by:  Patient, EMS personnel and nursing home  Shortness of Breath   Associated symptoms: cough    Associated symptoms: no abdominal pain, no chest pain and no vomiting        Prior to Admission Medications   Prescriptions Last Dose Informant Patient Reported? Taking?    HYDROcodone-acetaminophen (NORCO) 5-325 mg per tablet Unknown at Unknown time  Yes No   Sig: Take 1 tablet by mouth every 6 (six) hours as needed for pain   LORazepam (ATIVAN) 0 5 mg tablet Unknown at Unknown time  Yes No   Sig: Take by mouth every 8 (eight) hours as needed for anxiety   TURMERIC PO Unknown at Unknown time  Yes No   Sig: Take by mouth   acetaminophen (TYLENOL) 325 mg tablet Unknown at Unknown time  Yes No   Sig: Take 650 mg by mouth every 6 (six) hours as needed for mild pain   albuterol (2 5 mg/3 mL) 0 083 % nebulizer solution Unknown at Unknown time  Yes No   Sig: Take 2 5 mg by nebulization every 6 (six) hours as needed for wheezing or shortness of breath   amLODIPine (NORVASC) 10 mg tablet Unknown at Unknown time  Yes No   Sig: Take 10 mg by mouth daily   ammonium lactate (LAC-HYDRIN) 12 % cream Unknown at Unknown time  Yes No   Sig: Apply topically as needed for dry skin   aspirin (ECOTRIN LOW STRENGTH) 81 mg EC tablet Unknown at Unknown time  Yes No   Sig: Take 81 mg by mouth daily   atorvastatin (LIPITOR) 10 mg tablet Unknown at Unknown time  Yes No   Sig: Take 10 mg by mouth daily   bisacodyl (DULCOLAX) 5 mg EC tablet Unknown at Unknown time  Yes No   Sig: Take 10 mg by mouth daily as needed for constipation butalbital-acetaminophen-caffeine-codeine (FIORICET WITH CODEINE) -81-30 MG per capsule Unknown at Unknown time  Yes No   Sig: Take 1 capsule by mouth every 4 (four) hours as needed for headaches   dimethicone 1 % cream Unknown at Unknown time  Yes No   Sig: Apply topically 2 (two) times a day as needed for dry skin   famotidine (PEPCID) 20 mg tablet Unknown at Unknown time  Yes No   Sig: Take 20 mg by mouth 2 (two) times a day   ferrous sulfate 325 (65 FE) MG EC tablet Unknown at Unknown time  Yes No   Sig: Take 325 mg by mouth 3 (three) times a day with meals   furosemide (LASIX) 20 mg tablet Unknown at Unknown time  Yes No   Sig: Take 20 mg by mouth 2 (two) times a day   gabapentin (NEURONTIN) 100 mg capsule Unknown at Unknown time  Yes No   Sig: Take 100 mg by mouth 2 (two) times a day   glucagon (GLUCAGON EMERGENCY) 1 MG injection Unknown at Unknown time  Yes No   Sig: Inject 1 mg under the skin once as needed for low blood sugar   glucose 40 % Unknown at Unknown time  Yes No   Sig: Take 15 g by mouth once   insulin aspart (NovoLOG) 100 units/mL injection Unknown at Unknown time  Yes No   Sig: Inject under the skin 3 (three) times a day before meals   insulin glargine (LANTUS) 100 units/mL subcutaneous injection Unknown at Unknown time  Yes No   Sig: Inject 46 Units under the skin daily at bedtime   isosorbide dinitrate (ISORDIL) 30 mg tablet Unknown at Unknown time  Yes No   Sig: Take 30 mg by mouth 4 (four) times a day   lisinopril (ZESTRIL) 30 mg tablet Unknown at Unknown time  Yes No   Sig: Take 30 mg by mouth daily   magnesium hydroxide (MILK OF MAGNESIA) 400 mg/5 mL oral suspension Unknown at Unknown time  Yes No   Sig: Take 15 mL by mouth daily as needed for constipation   megestrol (MEGACE) 40 MG/ML suspension Unknown at Unknown time  Yes No   Sig: Take 200 mg by mouth daily   menthol-cetylpyridinium (CEPACOL) 3 MG lozenge Unknown at Unknown time  Yes No   Sig: Take 1 lozenge by mouth as needed for sore throat   menthol-zinc oxide (CALMOSEPTINE) 0 44-20 6 % OINT Unknown at Unknown time  Yes No   Sig: Apply topically 2 (two) times a day   metFORMIN (GLUCOPHAGE) 500 mg tablet Unknown at Unknown time  Yes No   Sig: Take 500 mg by mouth 2 (two) times a day with meals   metoprolol tartrate (LOPRESSOR) 25 mg tablet Unknown at Unknown time  Yes No   Sig: Take 25 mg by mouth every 12 (twelve) hours   multivitamin (THERAGRAN) TABS Unknown at Unknown time  Yes No   Sig: Take 1 tablet by mouth daily   polyethylene glycol (GLYCOLAX) powder Unknown at Unknown time  Yes No   Sig: Take 17 g by mouth daily   pseudoephedrine-acetaminophen (TYLENOL SINUS)  MG TABS Unknown at Unknown time  Yes No   Sig: Take 1 tablet by mouth every 4 (four) hours as needed for congestion   senna (SENOKOT) 8 6 MG tablet Unknown at Unknown time  Yes No   Sig: Take 1 tablet by mouth daily   sertraline (ZOLOFT) 50 mg tablet Unknown at Unknown time  Yes No   Sig: Take 50 mg by mouth daily   tiotropium (SPIRIVA) 18 mcg inhalation capsule Unknown at Unknown time  Yes No   Sig: Place 18 mcg into inhaler and inhale daily      Facility-Administered Medications: None       Past Medical History:   Diagnosis Date    Anemia     CHF (congestive heart failure) (HCC)     COPD (chronic obstructive pulmonary disease) (HCC)     Diabetes mellitus (HCC)     GERD (gastroesophageal reflux disease)     Hyperlipidemia     Hypertension     Stroke [facility])        History reviewed  No pertinent surgical history  History reviewed  No pertinent family history  I have reviewed and agree with the history as documented  Social History     Tobacco Use    Smoking status: Never Smoker    Smokeless tobacco: Never Used   Substance Use Topics    Alcohol use: Never     Frequency: Never    Drug use: Never        Review of Systems   Unable to perform ROS: Dementia   Respiratory: Positive for cough and shortness of breath      Cardiovascular: Negative for chest pain    Gastrointestinal: Negative for abdominal pain and vomiting  Physical Exam  Physical Exam   Constitutional: She appears well-developed and well-nourished  Tachypneic but able to speak and not in severe respiratory distress   HENT:   Nose: Nose normal    Mouth/Throat: Oropharynx is clear and moist  No oropharyngeal exudate  Eyes: Pupils are equal, round, and reactive to light  Conjunctivae and EOM are normal  No scleral icterus  Neck: Normal range of motion  Neck supple  No tracheal deviation present  Cardiovascular: Regular rhythm and normal heart sounds  No murmur heard  Tachycardic   Pulmonary/Chest: Tachypnea noted  No respiratory distress  She has wheezes (Positive mild expiratory wheeze and rhonchi bilaterally)  She has no rales  Abdominal: Soft  Bowel sounds are normal  There is no tenderness  There is no guarding  Musculoskeletal: She exhibits no edema or tenderness  Right lower leg: She exhibits no tenderness and no edema  Left lower leg: She exhibits no tenderness and no edema  Patient is bedridden cannot move lower extremities   Neurological: She is alert  No sensory deficit  She exhibits normal muscle tone  Ox2, moving UE on commands bilat, unable to move LE; no facial droop   Skin: Skin is warm and dry  Psychiatric: Her behavior is normal    flat affect   Nursing note and vitals reviewed        Vital Signs  ED Triage Vitals   Temperature Pulse Respirations Blood Pressure SpO2   02/14/19 0736 02/14/19 0736 02/14/19 0736 02/14/19 0736 02/14/19 0733   (!) 97 3 °F (36 3 °C) (!) 122 (!) 32 126/63 (!) 80 %      Temp Source Heart Rate Source Patient Position - Orthostatic VS BP Location FiO2 (%)   02/14/19 0736 02/14/19 0736 02/14/19 0736 02/14/19 0736 --   Temporal Monitor Lying Left arm       Pain Score       02/14/19 0736       No Pain           Vitals:    02/14/19 0845 02/14/19 0900 02/14/19 0915 02/14/19 0930   BP:  140/63  149/68   Pulse: (!) 121 (!) 122 (!) 117 (!) 114   Patient Position - Orthostatic VS:   Lying        Visual Acuity  Visual Acuity      Most Recent Value   L Pupil Size (mm)  3   R Pupil Size (mm)  3   L Pupil Shape  Round   R Pupil Shape  Round          ED Medications  Medications   vancomycin (VANCOCIN) 1,250 mg in sodium chloride 0 9 % 250 mL IVPB (1,250 mg Intravenous New Bag 2/14/19 0824)   sodium chloride 0 9 % bolus 1,000 mL (0 mL Intravenous Stopped 2/14/19 0925)   albuterol inhalation solution 5 mg (5 mg Nebulization Given 2/14/19 0813)   methylPREDNISolone sodium succinate (Solu-MEDROL) injection 125 mg (125 mg Intravenous Given 2/14/19 0820)   piperacillin-tazobactam (ZOSYN) 3 375 g in sodium chloride 0 9 % 50 mL IVPB (0 g Intravenous Stopped 2/14/19 0907)    EMS REPLENISHMENT MED ( Does not apply Given to EMS 2/14/19 0756)   aspirin chewable tablet 81 mg (81 mg Oral Given 2/14/19 0912)       Diagnostic Studies  Results Reviewed     Procedure Component Value Units Date/Time    Lactic acid, plasma [[id number]] Collected:  02/14/19 0929    Lab Status:   In process Specimen:  Blood from Line, Venous Updated:  02/14/19 0932    B-Type Natriuretic Peptide [facility] ONLY) [[id number]]  (Abnormal) Collected:  02/14/19 0815    Lab Status:  Final result Specimen:  Blood from Arm, Right Updated:  02/14/19 0904      pg/mL     Basic metabolic panel [[id number]]  (Abnormal) Collected:  02/14/19 0815    Lab Status:  Final result Specimen:  Blood from Arm, Right Updated:  02/14/19 6447     Sodium 149 mmol/L      Potassium 4 0 mmol/L      Chloride 110 mmol/L      CO2 27 mmol/L      ANION GAP 12 mmol/L      BUN 41 mg/dL      Creatinine 1 23 mg/dL      Glucose 192 mg/dL      Calcium 9 5 mg/dL      eGFR 42 ml/min/1 73sq m     Narrative:       National Kidney Disease Education Program recommendations are as follows:  GFR calculation is accurate only with a steady state creatinine  Chronic Kidney disease less than 60 ml/min/1 73 sq  meters  Kidney failure less than 15 ml/min/1 73 sq  meters  Lactic acid, plasma [[id number]]  (Abnormal) Collected:  02/14/19 0815    Lab Status:  Final result Specimen:  Blood from Arm, Right Updated:  02/14/19 0903     LACTIC ACID 2 5 mmol/L     Narrative:       Result may be elevated if tourniquet was used during collection  Troponin I [[id number]]  (Abnormal) Collected:  02/14/19 0815    Lab Status:  Final result Specimen:  Blood from Arm, Right Updated:  02/14/19 0901     Troponin I 0 04 ng/mL     Rapid Influenza Screen with Reflex PCR [[id number]]  (Normal) Collected:  02/14/19 0815    Lab Status:  Final result Specimen:  Nasopharyngeal Swab Updated:  02/14/19 0845     Rapid Influenza A Ag Negative     Rapid Influenza B Ag Negative    INFLUENZA A/B AND RSV, PCR [[id number]] Collected:  02/14/19 0815    Lab Status: In process Specimen:  Nasopharyngeal Swab Updated:  02/14/19 0845    Protime-INR [[id number]]  (Abnormal) Collected:  02/14/19 0815    Lab Status:  Final result Specimen:  Blood from Arm, Right Updated:  02/14/19 0841     Protime 14 2 seconds      INR 1 22    APTT [[id number]]  (Normal) Collected:  02/14/19 0815    Lab Status:  Final result Specimen:  Blood from Arm, Right Updated:  02/14/19 0841     PTT 26 seconds     Blood culture #1 [[id number]] Collected:  02/14/19 0835    Lab Status:   In process Specimen:  Blood from Line, Venous Updated:  02/14/19 0837    Blood gas, arterial [[id number]]  (Abnormal) Collected:  02/14/19 0822    Lab Status:  Final result Specimen:  Blood, Arterial from Radial, Right Updated:  02/14/19 0833     pH, Arterial 7 440     pCO2, Arterial 36 0 mm Hg      pO2, Arterial 64 0 mm Hg      HCO3, Arterial 24 0 mmol/L      Base Excess, Arterial 0 5 mmol/L      O2 Content, Arterial 15 8 mL/dL      O2 HGB,Arterial  92 1 %      SOURCE Radial, Right     BETHANY TEST Yes     Nasal Cannula 3 5    CBC and differential [[id number]]  (Abnormal) Collected:  02/14/19 0815    Lab Status:  Final result Specimen:  Blood from Arm, Right Updated:  02/14/19 0832     WBC 12 30 Thousand/uL      RBC 3 36 Million/uL      Hemoglobin 11 0 g/dL      Hematocrit 34 5 %       fL      MCH 32 7 pg      MCHC 31 8 g/dL      RDW 14 2 %      MPV 9 7 fL      Platelets 413 Thousands/uL      nRBC 0 /100 WBCs      Neutrophils Relative 81 %      Lymphocytes Relative 9 %      Monocytes Relative 9 %      Eosinophils Relative 1 %      Basophils Relative 0 %      Neutrophils Absolute 10 00 Thousands/µL      Lymphocytes Absolute 1 10 Thousands/µL      Monocytes Absolute 1 10 Thousand/µL      Eosinophils Absolute 0 10 Thousand/µL      Basophils Absolute 0 00 Thousands/µL     Blood culture #2 [[id number]] Collected:  02/14/19 0815    Lab Status: In process Specimen:  Blood from Line, Venous Updated:  02/14/19 0825    Troponin I [[id number]]     Lab Status:  No result Specimen:  Blood                  XR chest 1 view portable   ED Interpretation by Alivia Castro MD (02/14 8188)   RLL infiltrate      Final Result by Rod Tierney MD (02/14 5178)      Left lingular scarring/atelectasis  Minimal right base atelectasis               Workstation performed: [id number]                    Procedures  Procedures       Phone Contacts  ED Phone Contact    ED Course  ED Course as of Feb 14 0953   Thu Feb 14, 2019   0800 ST 120bpm, nl QRS, NSSTTW changes      0907 D/w Dr Mustapha Golden - accepts to ICU      0908 Critical care time in mgmt resp status and sepsis is 30 min                                  MDM    Disposition  Final diagnoses:   Pneumonia of right lower lobe due to infectious organism [facility])   Hypoxia   Bronchospasm   Sepsis, due to unspecified organism [facility])     Time reflects when diagnosis was documented in both MDM as applicable and the Disposition within this note     Time User Action Codes Description Comment    2/14/2019  9:05 AM Ofe CUMMINGS Add [J18 1] Pneumonia of right lower lobe due to infectious organism ([facility] [address] )     2/14/2019  9:05 AM Eunice Epsteiners Add [R09 02] Hypoxia     2/14/2019  9:05 AM Jackeline CUMMINGS Add [J98 01] Bronchospasm     2/14/2019  9:07 AM Jackeline CUMMINGS Add [A41 9] Sepsis, due to unspecified organism [facility])       ED Disposition     ED Disposition Condition Date/Time Comment    Admit Stable Thu Feb 14, 2019  9:08 AM Case was discussed with Dr Bere Encarnacion and the patient's admission status was agreed to be Admission Status: inpatient status to the service of Dr Bere Encarnacion   Follow-up Information    None         Patient's Medications   Discharge Prescriptions    No medications on file     No discharge procedures on file      ED Provider  Electronically Signed by           Epifanio Nelson MD  02/14/19 7812

## 2019-02-14 NOTE — PHYSICAL THERAPY NOTE
Physical Therapy Cancellation Note    PT order received  Chart review performed  At this time, PT evaluation cancelled as patient is not medically appropriate for therapy evaluation per discussion with pt's RN  PT will follow and evaluate as appropriate      Kameron Silva, PT

## 2019-02-15 ENCOUNTER — APPOINTMENT (INPATIENT)
Dept: NON INVASIVE DIAGNOSTICS | Facility: HOSPITAL | Age: 79
DRG: 871 | End: 2019-02-15
Payer: MEDICARE

## 2019-02-15 LAB
ALBUMIN SERPL BCP-MCNC: 3 G/DL (ref 3.5–5.7)
ALP SERPL-CCNC: 41 U/L (ref 55–165)
ALT SERPL W P-5'-P-CCNC: 10 U/L (ref 7–52)
ANION GAP SERPL CALCULATED.3IONS-SCNC: 7 MMOL/L (ref 4–13)
AST SERPL W P-5'-P-CCNC: 14 U/L (ref 13–39)
BILIRUB SERPL-MCNC: 0.4 MG/DL (ref 0.2–1)
BUN SERPL-MCNC: 37 MG/DL (ref 7–25)
CALCIUM SERPL-MCNC: 9 MG/DL (ref 8.6–10.5)
CHLORIDE SERPL-SCNC: 115 MMOL/L (ref 98–107)
CO2 SERPL-SCNC: 26 MMOL/L (ref 21–31)
CREAT SERPL-MCNC: 0.96 MG/DL (ref 0.6–1.2)
ERYTHROCYTE [DISTWIDTH] IN BLOOD BY AUTOMATED COUNT: 14.3 % (ref 11.5–14.5)
GFR SERPL CREATININE-BSD FRML MDRD: 57 ML/MIN/1.73SQ M
GLUCOSE SERPL-MCNC: 237 MG/DL (ref 65–140)
GLUCOSE SERPL-MCNC: 240 MG/DL (ref 65–99)
GLUCOSE SERPL-MCNC: 247 MG/DL (ref 65–140)
GLUCOSE SERPL-MCNC: 293 MG/DL (ref 65–140)
GLUCOSE SERPL-MCNC: 304 MG/DL (ref 65–140)
GLUCOSE SERPL-MCNC: 307 MG/DL (ref 65–140)
GLUCOSE SERPL-MCNC: 315 MG/DL (ref 65–140)
HCT VFR BLD AUTO: 31.8 % (ref 42–47)
HGB BLD-MCNC: 10.3 G/DL (ref 12–16)
LYMPHOCYTES # BLD AUTO: 0.71 THOUSAND/UL (ref 0.6–4.47)
LYMPHOCYTES # BLD AUTO: 9 % (ref 20–51)
MAGNESIUM SERPL-MCNC: 2.1 MG/DL (ref 1.9–2.7)
MCH RBC QN AUTO: 33.1 PG (ref 26–34)
MCHC RBC AUTO-ENTMCNC: 32.3 G/DL (ref 31–37)
MCV RBC AUTO: 103 FL (ref 81–99)
MONOCYTES # BLD AUTO: 0.16 THOUSAND/UL (ref 0–1.22)
MONOCYTES NFR BLD AUTO: 2 % (ref 4–12)
MRSA NOSE QL CULT: NORMAL
NEUTS BAND NFR BLD MANUAL: 4 % (ref 0–8)
NEUTS SEG # BLD: 7.03 THOUSAND/UL (ref 1.81–6.82)
NEUTS SEG NFR BLD AUTO: 85 % (ref 42–75)
NRBC BLD AUTO-RTO: 0 /100 WBCS
PHOSPHATE SERPL-MCNC: 3.7 MG/DL (ref 3–5.5)
PLATELET # BLD AUTO: 176 THOUSANDS/UL (ref 149–390)
PLATELET BLD QL SMEAR: ADEQUATE
PMV BLD AUTO: 9.7 FL (ref 8.6–11.7)
POTASSIUM SERPL-SCNC: 5 MMOL/L (ref 3.5–5.5)
PROT SERPL-MCNC: 6.5 G/DL (ref 6.4–8.9)
RBC # BLD AUTO: 3.1 MILLION/UL (ref 3.9–5.2)
RBC MORPH BLD: NORMAL
SODIUM SERPL-SCNC: 148 MMOL/L (ref 134–143)
TOTAL CELLS COUNTED SPEC: 100
WBC # BLD AUTO: 7.9 THOUSAND/UL (ref 4.8–10.8)

## 2019-02-15 PROCEDURE — G8997 SWALLOW GOAL STATUS: HCPCS

## 2019-02-15 PROCEDURE — 93306 TTE W/DOPPLER COMPLETE: CPT

## 2019-02-15 PROCEDURE — 93306 TTE W/DOPPLER COMPLETE: CPT | Performed by: INTERNAL MEDICINE

## 2019-02-15 PROCEDURE — 85007 BL SMEAR W/DIFF WBC COUNT: CPT | Performed by: NURSE PRACTITIONER

## 2019-02-15 PROCEDURE — 83735 ASSAY OF MAGNESIUM: CPT | Performed by: NURSE PRACTITIONER

## 2019-02-15 PROCEDURE — G8978 MOBILITY CURRENT STATUS: HCPCS

## 2019-02-15 PROCEDURE — G8979 MOBILITY GOAL STATUS: HCPCS

## 2019-02-15 PROCEDURE — G8988 SELF CARE GOAL STATUS: HCPCS

## 2019-02-15 PROCEDURE — 84100 ASSAY OF PHOSPHORUS: CPT | Performed by: NURSE PRACTITIONER

## 2019-02-15 PROCEDURE — 94760 N-INVAS EAR/PLS OXIMETRY 1: CPT

## 2019-02-15 PROCEDURE — G8980 MOBILITY D/C STATUS: HCPCS

## 2019-02-15 PROCEDURE — 92610 EVALUATE SWALLOWING FUNCTION: CPT

## 2019-02-15 PROCEDURE — G8987 SELF CARE CURRENT STATUS: HCPCS

## 2019-02-15 PROCEDURE — 99232 SBSQ HOSP IP/OBS MODERATE 35: CPT | Performed by: INTERNAL MEDICINE

## 2019-02-15 PROCEDURE — G8989 SELF CARE D/C STATUS: HCPCS

## 2019-02-15 PROCEDURE — 85027 COMPLETE CBC AUTOMATED: CPT | Performed by: NURSE PRACTITIONER

## 2019-02-15 PROCEDURE — G8996 SWALLOW CURRENT STATUS: HCPCS

## 2019-02-15 PROCEDURE — 82948 REAGENT STRIP/BLOOD GLUCOSE: CPT

## 2019-02-15 PROCEDURE — 97163 PT EVAL HIGH COMPLEX 45 MIN: CPT

## 2019-02-15 PROCEDURE — 80053 COMPREHEN METABOLIC PANEL: CPT | Performed by: NURSE PRACTITIONER

## 2019-02-15 PROCEDURE — 97167 OT EVAL HIGH COMPLEX 60 MIN: CPT

## 2019-02-15 RX ORDER — CEFEPIME HYDROCHLORIDE 2 G/50ML
2000 INJECTION, SOLUTION INTRAVENOUS EVERY 12 HOURS
Status: DISCONTINUED | OUTPATIENT
Start: 2019-02-15 | End: 2019-02-17

## 2019-02-15 RX ORDER — METOPROLOL TARTRATE 5 MG/5ML
5 INJECTION INTRAVENOUS ONCE
Status: COMPLETED | OUTPATIENT
Start: 2019-02-15 | End: 2019-02-15

## 2019-02-15 RX ORDER — METOPROLOL TARTRATE 5 MG/5ML
5 INJECTION INTRAVENOUS EVERY 6 HOURS
Status: DISCONTINUED | OUTPATIENT
Start: 2019-02-15 | End: 2019-02-19

## 2019-02-15 RX ORDER — DEXTROSE AND SODIUM CHLORIDE 5; .33 G/100ML; G/100ML
50 INJECTION, SOLUTION INTRAVENOUS CONTINUOUS
Status: DISCONTINUED | OUTPATIENT
Start: 2019-02-15 | End: 2019-02-15

## 2019-02-15 RX ORDER — METHYLPREDNISOLONE SODIUM SUCCINATE 40 MG/ML
40 INJECTION, POWDER, LYOPHILIZED, FOR SOLUTION INTRAMUSCULAR; INTRAVENOUS EVERY 12 HOURS SCHEDULED
Status: DISCONTINUED | OUTPATIENT
Start: 2019-02-15 | End: 2019-02-16

## 2019-02-15 RX ORDER — INSULIN GLARGINE 100 [IU]/ML
20 INJECTION, SOLUTION SUBCUTANEOUS
Status: DISCONTINUED | OUTPATIENT
Start: 2019-02-15 | End: 2019-02-16

## 2019-02-15 RX ORDER — METOPROLOL TARTRATE 5 MG/5ML
5 INJECTION INTRAVENOUS EVERY 6 HOURS PRN
Status: DISCONTINUED | OUTPATIENT
Start: 2019-02-15 | End: 2019-02-21 | Stop reason: HOSPADM

## 2019-02-15 RX ORDER — INSULIN GLARGINE 100 [IU]/ML
10 INJECTION, SOLUTION SUBCUTANEOUS
Status: DISCONTINUED | OUTPATIENT
Start: 2019-02-15 | End: 2019-02-15

## 2019-02-15 RX ORDER — HYDRALAZINE HYDROCHLORIDE 20 MG/ML
10 INJECTION INTRAMUSCULAR; INTRAVENOUS EVERY 6 HOURS PRN
Status: DISCONTINUED | OUTPATIENT
Start: 2019-02-15 | End: 2019-02-16

## 2019-02-15 RX ORDER — DEXTROSE AND SODIUM CHLORIDE 5; .45 G/100ML; G/100ML
75 INJECTION, SOLUTION INTRAVENOUS CONTINUOUS
Status: DISCONTINUED | OUTPATIENT
Start: 2019-02-15 | End: 2019-02-18

## 2019-02-15 RX ADMIN — INSULIN GLARGINE 20 UNITS: 100 INJECTION, SOLUTION SUBCUTANEOUS at 21:57

## 2019-02-15 RX ADMIN — METOPROLOL TARTRATE 5 MG: 5 INJECTION, SOLUTION INTRAVENOUS at 10:45

## 2019-02-15 RX ADMIN — METOPROLOL TARTRATE 5 MG: 5 INJECTION, SOLUTION INTRAVENOUS at 06:49

## 2019-02-15 RX ADMIN — METRONIDAZOLE 500 MG: 500 INJECTION, SOLUTION INTRAVENOUS at 18:52

## 2019-02-15 RX ADMIN — CEFEPIME HYDROCHLORIDE 2000 MG: 2 INJECTION, SOLUTION INTRAVENOUS at 09:26

## 2019-02-15 RX ADMIN — INSULIN LISPRO 4 UNITS: 100 INJECTION, SOLUTION INTRAVENOUS; SUBCUTANEOUS at 22:02

## 2019-02-15 RX ADMIN — METRONIDAZOLE 500 MG: 500 INJECTION, SOLUTION INTRAVENOUS at 09:26

## 2019-02-15 RX ADMIN — ANORECTAL OINTMENT: 15.7; .44; 24; 20.6 OINTMENT TOPICAL at 18:52

## 2019-02-15 RX ADMIN — PIPERACILLIN SODIUM AND TAZOBACTAM SODIUM 2.25 G: 2; .25 INJECTION, POWDER, LYOPHILIZED, FOR SOLUTION INTRAVENOUS at 02:26

## 2019-02-15 RX ADMIN — HYDRALAZINE HYDROCHLORIDE 10 MG: 20 INJECTION INTRAMUSCULAR; INTRAVENOUS at 09:27

## 2019-02-15 RX ADMIN — DEXTROSE AND SODIUM CHLORIDE 75 ML/HR: 5; 450 INJECTION, SOLUTION INTRAVENOUS at 09:26

## 2019-02-15 RX ADMIN — METOPROLOL TARTRATE 5 MG: 5 INJECTION, SOLUTION INTRAVENOUS at 01:25

## 2019-02-15 RX ADMIN — METHYLPREDNISOLONE SODIUM SUCCINATE 40 MG: 40 INJECTION, POWDER, FOR SOLUTION INTRAMUSCULAR; INTRAVENOUS at 06:10

## 2019-02-15 RX ADMIN — METHYLPREDNISOLONE SODIUM SUCCINATE 40 MG: 40 INJECTION, POWDER, FOR SOLUTION INTRAMUSCULAR; INTRAVENOUS at 21:54

## 2019-02-15 RX ADMIN — HEPARIN SODIUM 5000 UNITS: 5000 INJECTION INTRAVENOUS; SUBCUTANEOUS at 06:10

## 2019-02-15 RX ADMIN — ANORECTAL OINTMENT: 15.7; .44; 24; 20.6 OINTMENT TOPICAL at 09:27

## 2019-02-15 RX ADMIN — METOPROLOL TARTRATE 5 MG: 1 INJECTION, SOLUTION INTRAVENOUS at 03:20

## 2019-02-15 RX ADMIN — CEFEPIME HYDROCHLORIDE 2000 MG: 2 INJECTION, SOLUTION INTRAVENOUS at 21:54

## 2019-02-15 RX ADMIN — HEPARIN SODIUM 5000 UNITS: 5000 INJECTION INTRAVENOUS; SUBCUTANEOUS at 14:17

## 2019-02-15 RX ADMIN — HEPARIN SODIUM 5000 UNITS: 5000 INJECTION INTRAVENOUS; SUBCUTANEOUS at 21:54

## 2019-02-15 NOTE — PHYSICAL THERAPY NOTE
Physical Therapy Evaluation     Patient's Name: Stella Farmer    Admitting Diagnosis  Shortness of breath [R06 02]  Bronchospasm [J98 01]  Hypoxia [R09 02]  Sepsis, due to unspecified organism ([place] [address] ) [A41 9]  Pneumonia of right lower lobe due to infectious organism [facility]) [J18 1]    Problem List  Patient Active Problem List   Diagnosis    Sepsis ([place] [address] )    Acute on chronic respiratory failure with hypoxia ([place] [address] )    Pneumonia due to infectious organism    Bronchospasm    Hypernatremia    Renal insufficiency    COPD with acute exacerbation ([place] [address] )    Type 2 diabetes mellitus with complication, without long-term current use of insulin ([place] [address] )    Pressure ulcer of left buttock, stage 2    Other dysphagia       Past Medical History  Past Medical History:   Diagnosis Date    Anemia     CHF (congestive heart failure) ([place] [address] )     COPD (chronic obstructive pulmonary disease) ([place] [address] )     home 02 2l    Diabetes mellitus ([place] [address] )     DJD (degenerative joint disease)     Gait abnormality     GERD (gastroesophageal reflux disease)     Hyperlipidemia     Hypertension     Stroke [facility])        Past Surgical History  Past Surgical History:   Procedure Laterality Date    ABDOMINAL SURGERY          02/15/19 1044   Note Type   Note type Eval only   Pain Assessment   Pain Assessment FLACC   Pain Rating: FLACC (Rest) - Face 0   Pain Rating: FLACC (Rest) - Legs 0   Pain Rating: FLACC (Rest) - Activity 0   Pain Rating: FLACC (Rest) - Cry 0   Pain Rating: FLACC (Rest) - Consolability 0   Score: FLACC (Rest) 0   Pain Rating: FLACC (Activity) - Face 0   Pain Rating: FLACC (Activity) - Legs 0   Pain Rating: FLACC (Activity) - Activity 0   Pain Rating: FLACC (Activity) - Cry 0   Pain Rating: FLACC (Activity) - Consolability 1   Score: FLACC (Activity) 1   Home Living   Type of Home SNF  [facility])   Home Layout One level;Performs ADLs on one level; Able to live on main level with bedroom/bathroom; Access; Ramped entrance Bathroom Shower/Tub Walk-in shower   Bathroom Toilet Raised   Bathroom Equipment Grab bars in shower;Grab bars around toilet; [address]; Wheelchair-manual   Additional Comments As per H&P, pt  has movment in BUE's, requires full body mechanical lift for OOB activities  Prior Function   Level of [place] Total dependent   Lives With Facility staff   Receives Help From Personal care attendant   ADL Assistance Needs assistance   IADLs Needs assistance   Vocational Retired   Restrictions/Precautions   Wells [place] Bearing Precautions Per Order No   Other Precautions Droplet precautions;Cognitive; Fall Risk;Multiple lines;Telemetry;O2   General   Family/Caregiver Present No   Cognition   Overall Cognitive Status Unable to assess  (2/2 flat affect, decreased initiation & engagement)   Arousal/Participation Lethargic   Orientation Level Unable to assess   Memory Unable to assess   Following Commands Unable to follow one step commands   RUE Assessment   RUE Assessment   (see OT assessment)   LUE Assessment   LUE Assessment   (see OT assessment)   RLE Assessment   RLE Assessment X   RLE Overall PROM   R Hip Flexion limited   R Hip Extension limited   R Knee Flexion limited  (presents w/ contracture)   R Knee Extension limited   (presents w/ contracture)   R Ankle Dorsiflexion limited   LLE Assessment   LLE Assessment X   LLE Overall PROM   L Hip Flexion limited   L Hip Extension limited   L Knee Flexion limited  (presents w/ contracture)   L Knee Extension   (presents w/ contracture)   L Ankle Dorsiflexion limited   Coordination   Movements are Fluid and Coordinated 0   Bed Mobility   Rolling R 1  Dependent   Rolling L 1  Dependent   Supine to Sit Unable to assess  (2/2 pt 's medical status and PLOF)   Additional Comments HR at rest: fluctuated between 87<->168 /67 POX 96% on O2   Transfers   Sit to Stand Unable to assess   Balance   Static Sitting   (DNT) Endurance Deficit   Endurance Deficit Yes   Endurance Deficit Description HR notably fluctuated 87<-> 168 at rest w/ minimal PROM   Activity Tolerance   Activity Tolerance Treatment limited secondary to medical complications (Comment); Patient limited by fatigue  (decreased initiation & engagement)   Nurse Made Aware yes, Kath Echeverria    Assessment   Prognosis Guarded   Assessment Pt is 66 y o  female seen for PT evaluation s/p admit to [address] on 2/14/2019 w/ Sepsis ([address] )  PT consulted to assess pt's functional mobility and d/c needs  Order placed for PT eval and tx, w/ activity as tolerated order  Comorbidities affecting pt's physical performance at time of assessment include: weakness, sepsis, acute on chronic respiratory failure w/ hypoxia, pneumonia, hypernatremia, COPD, pressure ulcer L buttocks  PTA, pt was dependent for all mobility tasks  Personal factors affecting pt at time of IE include: communication issues, decreased cognition, decreased initiation and engagement, unable to perform physical activity and limited insight into impairments  Please find objective findings from PT assessment regarding body systems outlined above with impairments and limitations including weakness, impaired balance, decreased endurance, impaired coordination, gait deviations, decreased activity tolerance, decreased safety awareness, impaired judgement, fall risk and decreased cognition  The following objective measures performed on IE also reveal limitations: Barthel Index: 0/100  Pt's clinical presentation is currently unstable/unpredictable  Pt to benefit from continued PT tx to address deficits as defined above and maximize level of functional independent mobility and consistency  From PT/mobility standpoint, recommendation at time of d/c would be return to SNF pending progress in order to facilitate return to PLOF  D/C acute skilled inpatient PT interventions as patient appears to be at functional, dependent baseline w/all functional tasks  Goals   Patient Goals Unknown as pt  could not provide 2/2 cognition, decreased intiation, and engagement  Treatment Day 0   Recommendation   Recommendation Long-term skilled nursing home placement  (return to [facility])   PT - OK to Discharge Yes  (If medically stable)   Additional Comments Upon conclusion, patient was resting in bed w/ nursing staff informed of assessment outcome     Barthel Index   Feeding 0   Bathing 0   Grooming Score 0   Dressing Score 0   Bladder Score 0   Bowels Score 0   Toilet Use Score 0   Transfers (Bed/Chair) Score 0   Mobility (Level Surface) Score 0   Stairs Score 0   Barthel Index Score 0     Vasyl Leach PT

## 2019-02-15 NOTE — UTILIZATION REVIEW
Initial Clinical Review    Admission: Date/Time/Statement: 2/14/19 @ 0908     Orders Placed This Encounter   Procedures    Inpatient Admission (expected length of stay for this patient Order details is greater than two midnights)     Standing Status:   Standing     Number of Occurrences:   1     Order Specific Question:   Admitting Physician     Answer:   Verona Edgar [[id number]]     Order Specific Question:   Level of Care     Answer:   Critical Care [15]     Order Specific Question:   Estimated length of stay     Answer:   More than 2 Midnights     Order Specific Question:   Certification     Answer:   I certify that inpatient services are medically necessary for this patient for a duration of greater than two midnights  See H&P and MD Progress Notes for additional information about the patient's course of treatment  ED: Date/Time/Mode of Arrival:   ED Arrival Information     Expected Arrival Acuity Means of Arrival Escorted By Service Admission Type    - 2/14/2019 07:24 Emergent Ambulance [facility] Emergency    Arrival Complaint    shortness of breath        Chief Complaint:   Chief Complaint   Patient presents with    Shortness of Breath     From [facility] [facility] home for Shortness of Breath starting this morning  Patient inital SpO2 was 80 on 2 L chronic      History of Illness: Koki Acosta is a 66 y o  female who was brought from [address] after she was found dyspneic with RR 40s and hypoxic with pulse ox of 84% on 2L  Patient is a poor historian and only answers certain questions  Most information was obtained from [facility] RN staff  According to the staff, patient received a neb treatment and pulse ox slightly improved to 90%  Subsequently, she was sent to ED for further evaluation  Staff states that patient appears to be in her normal state of health up until this AM  Patient states having pain on her buttocks   She denies chest pain or palpitation, nausea, vomiting, abdominal pain  She was unable to answer her full name, date of birth, or where she is  Of note, patient has history of CVA in 2013 and has been a resident of [facility] since  Patient mental status is wax and wane with period of "not responding with blank stare"  At times, patient would be able to carry on a conversation with staff  Patient only moves her upper extremities  She is a danya lift OOB with very minimal movement of her legs for the past year  Patient is incontinent of urine and bowel  Currently on pureed consistency and honey thick liquid  Patient takes medications crushed with apple sauce/pudding       ED Vital Signs:   ED Triage Vitals   Temperature Pulse Respirations Blood Pressure SpO2   02/14/19 0736 02/14/19 0736 02/14/19 0736 02/14/19 0736 02/14/19 0733   (!) 97 3 °F (36 3 °C) (!) 122 (!) 32 126/63 (!) 80 %      Temp Source Heart Rate Source Patient Position - Orthostatic VS BP Location FiO2 (%)   02/14/19 0736 02/14/19 0736 02/14/19 0736 02/14/19 0736 --   Temporal Monitor Lying Left arm       Pain Score       02/14/19 0736       No Pain        Wt Readings from Last 1 Encounters:   02/14/19 81 2 kg (179 lb)     Vital Signs (abnormal):   02/15/19 1200 99 1 °F (37 3 °C) 82 26Abnormal  155/72 104 96 %    02/15/19 0800 98 9 °F (37 2 °C) 78 24Abnormal  198/92Abnormal   96 % Nasal cannula   02/15/19 0400  79 29Abnormal  197/98Abnormal  124     02/15/19 0300  82 27Abnormal  188/86Abnormal  123     02/15/19 0015  91 27Abnormal  175/66Abnormal  113 100 %    02/15/19 0000  92 46Abnormal  205/88Abnormal  126 100 %    02/14/19 2230  87 28Abnormal  161/72 103 99 %    02/14/19 2000  94 30Abnormal    96 %    02/14/19 1900  95 23Abnormal  137/64 92 96 % Nasal cannula   02/14/19 1600  100 37Abnormal  155/65 93 91 %    02/14/19 1500  101 29Abnormal  142/63 91 91 %    02/14/19 1400  100 30Abnormal  124/59 85 97 %    02/14/19 1300  105 26Abnormal  137/62 89 76 %Abnormal     02/14/19 1200 98 9 °F (37 2 °C) 111Abnormal  27Abnormal  142/63 90 96 %    02/14/19 1100  107Abnormal  25Abnormal  108/56 80 95 %    02/14/19 1015 98 1 °F (36 7 °C) 107Abnormal  26Abnormal  121/56 81 95 % Nasal cannula   02/14/19 0930  114Abnormal  31Abnormal  149/68  95 %    02/14/19 0915 98 °F (36 7 °C) 117Abnormal  32Abnormal    94 % Nasal cannula   02/14/19 0900  122Abnormal  36Abnormal  140/63  94 %    02/14/19 0845  121Abnormal  34Abnormal    94 %    02/14/19 0830  121Abnormal  32Abnormal  141/63  94 %    02/14/19 0815  121Abnormal  34Abnormal    94 % Nasal cannula   02/14/19 0800  122Abnormal  33Abnormal  125/57  92 %    02/14/19 0745  124Abnormal  34Abnormal    100 %    02/14/19 0736 97 3 °F (36 3 °C)Abnormal  122Abnormal  32Abnormal  126/63  98 % None (Room air)   02/14/19 0733      80 %Abnormal       Pertinent Labs/Diagnostic Test Results:     Arterial pO2 64 0  poc glucose range 237-315  Na 149  Cl 110  BUN/Cr 41/1 23  Glucose 192, 240   Trop 0 04, 0 08    Lactic acid 2 5 x 2 then 1 7  WBC 12 30  H/H 11 0/34 5  PT 14 2  Flu/RSV negative   procalcitonin 0 92    Color Yellow   Clarity, UA Cloudy   SL AMB SPECIFIC GRAVITY_URINE 1 020   Glucose, UA Negat    Ketones, UA Negat    Blood, UA Trace    Nitrite, UA Posit    Leukocytes, UA 1+   pH, UA 5 5   POCT URINE PROTEIN Negat    Bilirubin, UA Negat    SL AMB POCT UROBILINOGEN 0 2   RBC, UA 1-2   WBC, UA Innum    Bacteria, UA Innum    Uric Acid Ann, UA Occas    2/14 CXR - Left lingular scarring/atelectasis  Minimal right base atelectasis  2/14 CT head - No acute intracranial abnormality  Microangiopathic changes with bilateral basal ganglia lacunar infarcts and a right cerebellar infarct, present previously      2/14 Xray sacrum and coccyx - no OM    2/17 CXR - Right basal opacity, favor atelectasis    ED Treatment:   Medication Administration from 02/14/2019 0724 to 02/14/2019 1000    Date/Time Order Dose Route Action   02/14/2019 0823 sodium chloride 0 9 % bolus 1,000 mL 1,000 mL Intravenous New Bag   02/14/2019 0813 albuterol inhalation solution 5 mg 5 mg Nebulization Given   02/14/2019 0820 methylPREDNISolone sodium succinate (Solu-MEDROL) injection 125 mg 125 mg Intravenous Given   02/14/2019 0841 piperacillin-tazobactam (ZOSYN) 3 375 g in sodium chloride 0 9 % 50 mL IVPB 3 375 g Intravenous New Bag   02/14/2019 0824 vancomycin (VANCOCIN) 1,250 mg in sodium chloride 0 9 % 250 mL IVPB 1,250 mg Intravenous New Bag   02/14/2019 0912 aspirin chewable tablet 81 mg 81 mg Oral Given        Past Medical/Surgical History: Active Ambulatory Problems     Diagnosis Date Noted    Bronchospasm 02/14/2019    Renal insufficiency 02/14/2019     Resolved Ambulatory Problems     Diagnosis Date Noted    No Resolved Ambulatory Problems     Past Medical History:   Diagnosis Date    Anemia     [facility]est[facility])     COPD (chronic obstructive pulmonary disease) ([facility])     Diabetes mellitus (HCC)     DJD (degenerative joint disease)     Gait abnormality     GERD (gastroesophageal reflux disease)     Hyperlipidemia     Hypertension     Stroke [facility])      Admitting Diagnosis: Shortness of breath [R06 02]  Bronchospasm [J98 01]  Hypoxia [R09 02]  Sepsis, due to unspecified organism ([facility] ) [A41 9]  Pneumonia of right lower lobe due to infectious organism ([facility] ) [J18 1]  Age/Sex: 66 y o  female     Assessment/Plan:    * Sepsis ([facility] [address] )  Assessment & Plan  · Patient meets criteria for sepsis with tachycardia, tachypnea, leukocytosis suspected to be secondary to possible aspiration pneumonia versus healthcare associated pneumonia  · Will continue broad-spectrum antibiotics with vancomycin and Zosyn  · Pending sputum culture, urine culture, and blood cultures  Noted that urine culture was obtained after the patient received 1 dose of vancomycin     · Check procalcitonin  · IV fluid     Pneumonia due to infectious organism  Assessment & Plan  · Suspected aspiration pneumonia with history of dysphagia  Possible healthcare associated pneumonia  · Initial chest x-ray does not show any infiltrate however with the patient's clinical presentation pneumonia is highly suspected  Will repeat cxr later on today  · Will continue treatment stated above  · Pending cultures result  · Will check MRSA  · Rapid influenza a and B- negative pending confirmation test   · Check urine for Legionella and strep  · Check Sputum culture     Acute on chronic respiratory failure with hypoxia ([facility] )  Assessment & Plan  · Suspected to be secondary to acute infection  · The patient uses 2 L oxygen chronically  · Continue with oxygen supplement  · Respiratory protocol  · Continue treatment stated above     COPD with acute exacerbation (HCC)  Assessment & Plan  · Acute exacerbation is secondary to acute infection  · IV Solu-Medrol and neb treatment  · Continue treatment stated above      Other dysphagia  Assessment & Plan  · Consult speech  · Aspiration precautions  · Will keep NPO for now until seen by speech      Pressure ulcer of left buttock, stage 2  Assessment & Plan  · Present on admission  · Will consult wound care  · Continue with local care with frequent turn and reposition     Type 2 diabetes mellitus with complication, without long-term current use of insulin ([facility] )  Assessment & Plan  No results found for: HGBA1C       Recent Labs     02/14/19  1149   POCGLU 268*       Blood Sugar Average: Last 72 hrs:  (P) 268   · Will hold off on Lantus at this time as the patient is being kept NPO  · Will place on insulin sliding scale  · Will monitor closely as she will be on Solu-Medrol      Hypernatremia  Assessment & Plan  · This is related to poor oral intake and acute infection  Patient appears to be volume depleted as well  · Will aggressively treat with IV fluid- will use normal saline for volume depletion  Repeat labs in the a m  · Consult Nephrology     VTE Prophylaxis: Heparin  Code Status: Full Code- according to POA  Anticipated Length of Stay:  Patient will be admitted on an Inpatient basis with an anticipated length of stay of  > 2 midnights    Justification for Hospital Stay: severe sepsis     Admission Orders:  Scheduled Meds:   Current Facility-Administered Medications:  acetaminophen 650 mg Oral Q6H PRN    albuterol 2 5 mg Nebulization Q4H PRN    amLODIPine 10 mg Oral Daily    ammonium lactate  Topical BID PRN    aspirin 81 mg Oral Daily    atorvastatin 10 mg Oral Daily    bisacodyl 10 mg Oral Daily PRN    cefepime 2,000 mg Intravenous Q12H Last Rate: 2,000 mg (02/15/19 0926)   dextrose 5 % and sodium chloride 0 45 % 75 mL/hr Intravenous Continuous Last Rate: 75 mL/hr (02/15/19 1200)   famotidine 20 mg Oral BID    ferrous sulfate 325 mg Oral TID With Meals    gabapentin 100 mg Oral BID    heparin (porcine) 5,000 Units Subcutaneous Q8H [address]    hydrALAZINE 10 mg Intravenous Q6H PRN    HYDROcodone-acetaminophen 1 tablet Oral Q6H PRN    insulin glargine 10 Units Subcutaneous HS    insulin lispro 1-5 Units Subcutaneous HS    insulin lispro 1-6 Units Subcutaneous Q6H    isosorbide dinitrate 30 mg Oral BID after meals    LORazepam 0 5 mg Oral Q8H PRN    megestrol 200 mg Oral Daily    menthol-zinc oxide  Topical BID    methylPREDNISolone sodium succinate 40 mg Intravenous Q12H ELIO    metoprolol 5 mg Intravenous Q6H PRN    metoprolol tartrate 25 mg Oral Q12H ELIO    metroNIDAZOLE 500 mg Intravenous Q8H Last Rate: 500 mg (02/15/19 0926)   polyethylene glycol 17 g Oral Daily    senna 1 tablet Oral Daily    sertraline 50 mg Oral Daily    tiotropium 18 mcg Inhalation Daily      Continuous Infusions:   dextrose 5 % and sodium chloride 0 45 % 75 mL/hr Last Rate: 75 mL/hr (02/15/19 1200)     PRN Meds:   acetaminophen    albuterol    ammonium lactate x1    bisacodyl    hydrALAZINE x1    HYDROcodone-acetaminophen   LORazepam    Metoprolol x3    CRITICAL CARE   SCDs  UP W/ ASSIST   NC OXYGEN 2L  POC GLUCOSE Q 6 HR  WOUND CARE   SPUTUM C&S  DIET NPO   CONS NEPHROLOGY   CONS WOUND CARE   CARDIAC ECHO   SPEECH SWALLOW EVAL   2/15 TRANSFER TO [facility] SURG

## 2019-02-15 NOTE — PROGRESS NOTES
Progress Note Tania Bejarano 1940, 66 y o  female MRN: [id number]    Unit/Bed#: ICU 04 Encounter: [id number]    Primary Care Provider: Haim Segal DO   Date and time admitted to hospital: 2/14/2019  7:25 AM        * Sepsis [facility])  Assessment & Plan  · Patient meets criteria for sepsis with tachycardia, tachypnea, leukocytosis suspected to be secondary to aspiration pneumonia  · We will DC vancomycin, will treat with cefepime and Flagyl as this appears to be related to an aspiration  · Pending sputum culture, urine culture, and blood cultures  Noted that urine culture was obtained after the patient received 1 dose of vancomycin  · Follow-up procalcitonin  · IV fluids    Pneumonia due to infectious organism  Assessment & Plan  · Suspected aspiration pneumonia with history of CVA and dysphagia    · We will treat with cefepime and Flagyl  · Patient is currently NPO due to aspiration  · Speech therapy is recommending PEG tube placement versus hospice in light of significant aspiration  · All of these were conveyed to the patient's daughter Mitch Keita cell phone number [phone], home phone number [phone]  · She would like to think about her options, and will be coming up this weekend to see her mother at which time she will make a decision regarding PEG tube versus hospice  · Otherwise continue with above-mentioned medical treatment    Other dysphagia  Assessment & Plan  · Appreciate speech input  · Keep NPO  · Aspiration precautions  · Decision regarding PEG tube versus hospice pending daughters decision as noted above      Pressure ulcer of left buttock, stage 2  Assessment & Plan  · Present on admission  · Will consult wound care  · Continue with local care with frequent turn and reposition    Type 2 diabetes mellitus with complication, without long-term current use of insulin ([facility] )  Assessment & Plan  No results found for: HGBA1C    Recent Labs     02/14/19  1149   POCGLU 268* Blood Sugar Average: Last 72 hrs:  (P) 268   · Resume Lantus albeit at a much lower dose given the patient is NPO  · Will place on insulin sliding scale  · Will monitor closely as she will be on Solu-Medrol  COPD with acute exacerbation ([facility] [address] )  Assessment & Plan  · Acute exacerbation appears to be improving  · Titrate down on steroids  · Continue treatment stated above  Hypernatremia  Assessment & Plan  · This is related to poor oral intake in free water deficit  · IV fluids  · Consult Nephrology    Acute on chronic respiratory failure with hypoxia (HCC)  Assessment & Plan  · Suspected to be secondary to acute infection  · The patient uses 2 L oxygen chronically  · Improving  · Respiratory protocol  · Continue treatment stated above        VTE Pharmacologic Prophylaxis: Pharmacologic: Heparin    Patient Centered Rounds: I have performed bedside rounds with nursing staff today  Discussions with Specialists or Other Care Team Provider: nephrology  Education and Discussions with Family / Patient: POA/Daughter: Antonio Powell cell phone number [phone], home phone number [phone]    Time Spent for Care: 30 minutes  More than 50% of total time spent on counseling and coordination of care as described above  Current Length of Stay: 1 day(s)    Current Patient Status: Inpatient   Certification Statement: The patient will continue to require additional inpatient hospital stay due to aspiration pneumonia    Discharge Plan:  Pending decision regarding hospice versus PEG tube    Code Status: Level 1 - Full Code    Subjective:   Patient seen examined    Unable to obtain history due to patient's baseline mental status    Objective:     Vitals:   Temp (24hrs), Av 4 °F (36 9 °C), Min:98 °F (36 7 °C), Max:98 9 °F (37 2 °C)    Temp:  [98 °F (36 7 °C)-98 9 °F (37 2 °C)] 98 9 °F (37 2 °C)  HR:  [] 78  Resp:  [18-46] 24  BP: (108-205)/(56-98) 203/91  SpO2:  [76 %-100 %] 96 %  Body mass index is 27 22 kg/m²  Input and Output Summary (last 24 hours): Intake/Output Summary (Last 24 hours) at 2/15/2019 0930  Last data filed at 2/15/2019 0701  Gross per 24 hour   Intake 2666 72 ml   Output 1501 ml   Net 1165 72 ml       Physical Exam:     Physical Exam   Constitutional: No distress  Frail appearing elderly female   HENT:   Head: Normocephalic and atraumatic  Eyes: Pupils are equal, round, and reactive to light  Neck: Normal range of motion  Neck supple  Cardiovascular: Normal rate, regular rhythm and normal heart sounds  Pulmonary/Chest: Effort normal  No respiratory distress  She has no wheezes  She has rales  Abdominal: Soft  Bowel sounds are normal  She exhibits no distension  Musculoskeletal: Normal range of motion  She exhibits no edema  Neurological:   Sleeping, although response to noxious stimuli  Can respond with simple yes and no by nodding head   Skin: Skin is warm  She is not diaphoretic  There is erythema  Pressure ulcers noted   Psychiatric:   Unable to assess   Nursing note and vitals reviewed  Addendum: Pressure ulcers were present on admission    Additional Data:     Labs:    Results from last 7 days   Lab Units 02/15/19  0503 02/14/19  0815   WBC Thousand/uL 7 90 12 30*   HEMOGLOBIN g/dL 10 3* 11 0*   HEMATOCRIT % 31 8* 34 5*   PLATELETS Thousands/uL 176 209   NEUTROS PCT %  --  81*   LYMPHS PCT %  --  9*   LYMPHO PCT % 9*  --    MONOS PCT %  --  9   MONO PCT % 2*  --    EOS PCT %  --  1     Results from last 7 days   Lab Units 02/15/19  0503   POTASSIUM mmol/L 5 0   CHLORIDE mmol/L 115*   CO2 mmol/L 26   BUN mg/dL 37*   CREATININE mg/dL 0 96   CALCIUM mg/dL 9 0   ALK PHOS U/L 41*   ALT U/L 10   AST U/L 14     Results from last 7 days   Lab Units 02/14/19  0815   INR  1 22     Results from last 7 days   Lab Units 02/15/19  0603 02/14/19  2144 02/14/19  1726 02/14/19  1149   POC GLUCOSE mg/dl 237* 247* 284* 268*           * I Have Reviewed All Lab Data Listed Above    * Additional Pertinent Lab Tests Reviewed:  [address] [facility] Admission  Reviewed    Imaging:  Imaging Reports Reviewed Today Include: n/a    Recent Cultures (last 7 days):     Results from last 7 days   Lab Units 02/14/19  1404 02/14/19  1246 02/14/19  0815   GRAM STAIN RESULT  No polys seen*  Rare Gram negative rods*  --   --    INFLUENZA B PCR   --   --  Not Detected   RSV PCR   --   --  Not Detected   LEGIONELLA URINARY ANTIGEN   --  Negative  --        Last 24 Hours Medication List:     Current Facility-Administered Medications:  acetaminophen 650 mg Oral Q6H PRN ALY Woods    albuterol 2 5 mg Nebulization Q4H PRN Gabriela Miller MD    amLODIPine 10 mg Oral Daily ALY Woods    ammonium lactate  Topical BID PRN ALY Woods    aspirin 81 mg Oral Daily Jennifer Munoz, ALY    atorvastatin 10 mg Oral Daily ALY Woods    bisacodyl 10 mg Oral Daily PRN ALY Woods    cefepime 2,000 mg Intravenous Q12H Gabriela Miller MD Last Rate: 2,000 mg (02/15/19 0926)   dextrose 5 % and sodium chloride 0 45 % 75 mL/hr Intravenous Continuous Gabriela Miller MD Last Rate: 75 mL/hr (02/15/19 0926)   famotidine 20 mg Oral BID ALY Woods    ferrous sulfate 325 mg Oral TID With Meals ALY Woods    gabapentin 100 mg Oral BID ALY Woods    heparin (porcine) 5,000 Units Subcutaneous [facility] ALY Woods    hydrALAZINE 10 mg Intravenous Q6H PRN Gabriela Miller MD    HYDROcodone-acetaminophen 1 tablet Oral Q6H PRN ALY Woods    insulin glargine 10 Units Subcutaneous HS Gabriela Miller MD    insulin lispro 1-5 Units Subcutaneous HS ALY Woods    insulin lispro 1-6 Units Subcutaneous Q6H ALY Woods    isosorbide dinitrate 30 mg Oral BID after meals ALY Woods    LORazepam 0 5 mg Oral Q8H PRN ALY Woods    megestrol 200 mg Oral Daily ALY Woods    menthol-zinc oxide Topical BID ALY Young    methylPREDNISolone sodium succinate 40 mg Intravenous Q12H [facility] Minnie York MD    metoprolol 5 mg Intravenous Q6H PRN Harsh Flores PA-C    metoprolol tartrate 25 mg Oral Q12H [facility] ALY Young    metroNIDAZOLE 500 mg Intravenous Q8H Minnie York MD Last Rate: 500 mg (02/15/19 0926)   polyethylene glycol 17 g Oral Daily ALY Young    senna 1 tablet Oral Daily ALY oYung    sertraline 50 mg Oral Daily ALY Young    tiotropium 18 mcg Inhalation Daily ALY Young         Today, Patient Was Seen By: Minnie York MD    ** Please Note: Dictation voice to text software may have been used in the creation of this document   **

## 2019-02-15 NOTE — SOCIAL WORK
CM attempted to meet with pt at bedside in ICU but pt unable to participate in evaluation  Pt is a resident of [facility]  CM confirmed with Gregor Mcgarry at [facility] that pt is a bed hold at the facilty  Per Dr Leela Tinajero pt is for PEG tube placement vs  Hospice at this time  Family considering options  CM will continue to follow to assess for needs going forward  Pt would need BLS transport back to [facility] upon discharge  CM reviewed d/c planning process including the following: identifying help at home, patient preference for d/c planning needs, availability of treatment team to discuss questions or concerns patient and/or family may have regarding understanding medications and recognizing signs and symptoms once discharged  CM also encouraged patient to follow up with all recommended appointments after discharge  Patient advised of importance for patient and family to participate in managing patients medical well being

## 2019-02-15 NOTE — OCCUPATIONAL THERAPY NOTE
Occupational Therapy Evaluation      Feleciapushpa Bejarano    2/15/2019    Patient Active Problem List   Diagnosis    Sepsis ([place] [address] )    Acute on chronic respiratory failure with hypoxia (HCC)    Pneumonia due to infectious organism    Bronchospasm    Hypernatremia    Renal insufficiency    COPD with acute exacerbation ([place] [address] )    Type 2 diabetes mellitus with complication, without long-term current use of insulin (HCC)    Pressure ulcer of left buttock, stage 2    Other dysphagia       Past Medical History:   Diagnosis Date    Anemia     CHF (congestive heart failure) ([place] [address] )     COPD (chronic obstructive pulmonary disease) ([place] [address] )     home 02 2l    Diabetes mellitus ([place] [address] )     DJD (degenerative joint disease)     Gait abnormality     GERD (gastroesophageal reflux disease)     Hyperlipidemia     Hypertension     Stroke [facility])        Past Surgical History:   Procedure Laterality Date    ABDOMINAL SURGERY          02/15/19 1042   Note Type   Note type Eval only   Restrictions/Precautions   Weight Bearing Precautions Per Order No   Other Precautions Fall Risk;O2;Telemetry;Multiple lines;Droplet precautions;Contact/isolation   Pain Assessment   Pain Assessment No/denies pain   Pain Score No Pain  (pt shook head no)   Home Living   Type of Home SNF   Home Layout One level; Access   Bathroom Shower/Tub Tub/shower unit   [employer] Raised   [employer] Grab bars in shower; Shower chair;Grab bars around toilet   [facility]; Wheelchair-manual;Hospital bed;Mechanical lift  (per facility, pt reportedly danya lifted)   Additional Comments MVN&RC   Prior Function   Level of [place] Needs assistance with IADLs; Needs assistance with ADLs and functional mobility   Lives With Facility staff   Receives Help From Personal care attendant   ADL Assistance Needs assistance   IADLs Needs assistance  (D)   Falls in the last 6 months   (unknown)   Vocational Retired Subjective   Subjective minimal verbal responses, slightly shook head 'yes', 'no', at times   ADL   Eating Assistance   (NPO)   Grooming Assistance 1  Total Assistance   UB Bathing Assistance 1  Total Assistance   LB Bathing Assistance 1  Total Assistance   [address] 1  Total Assistance   LB Dressing Assistance 1  Total Assistance   Toileting Assistance  1  Total Assistance   Bed Mobility   Supine to Sit Unable to assess   Sit to Supine Unable to assess   Additional Comments pt with quickly fluctuating HR, up tp 168, 177 +, at rest, increased BP and poor direction following   Transfers   Sit to Stand Unable to assess   Balance   Static Sitting   (unable to assess)   Activity Tolerance   Activity Tolerance Treatment limited secondary to medical complications (Comment)  (high HR, BP at rest)   RUE Assessment   RUE Assessment   (unable to fully assess, distal UE grossly WFL passively,)   LUE Assessment   LUE Assessment   (no AROM observed, treamors hand Rt > Lt)   Cognition   Overall Cognitive Status Unable to assess   Arousal/Participation Lethargic   Attention JENNIFER   Orientation Level Unable to assess   Memory Unable to assess   Following Commands Unable to follow one step commands   Assessment   Limitation   (D for all daily needs)   Prognosis Guarded   Assessment Pt is a 66 y o  female seen for OT evaluation s/p admit to [facility] on 2/14/2019 w/ Sepsis ([address] )  Comorbidities affecting pt's functional performance at time of assessment include: DM, COPD and Acute on Chronic Respiratory Failure with Hypoxia, Pressure Ulcer Lt Buttock Stage @, Dysphagia, RI  Personal factors affecting pt at time of IE include:current medical status  Prior to admission, pt was D for IADL's, residing at SNF with assistance for all daily tasks  Upon evaluation: Pt  Is dependent for all daily needs  From OT standpoint, recommendation at time of d/c would be return to SNF       Goals   Patient Goals unable to stste   Plan Treatment Interventions   (eval only)   Goal Expiration Date 02/15/19   Treatment Day 0   OT Frequency Eval only   Recommendation   OT Discharge Recommendation   (return to SNF)   OT - OK to Discharge   (when medically stable)   Barthel Index   Feeding 0   Bathing 0   Grooming Score 0   Dressing Score 0   Bladder Score 0   Bowels Score 0   Toilet Use Score 0   Transfers (Bed/Chair) Score 0   Mobility (Level Surface) Score 0   Stairs Score 0   Barthel Index Score 0   Shilpa Patches, OT

## 2019-02-15 NOTE — NURSING NOTE
Pt admitted to med surg  Vss  Pt unable to verbally communicate  HOB elevated to 45 degrees  Aspiration precautions in place  Personal needs and call bell within reach, will continue to monitor

## 2019-02-15 NOTE — NURSING NOTE
Patient has elevated BP,(see flowsheet)  NPO and unable to swallow po meds  Johana Pike PA-C notified of elevated BP @ 1:25AM  Lopressor ordered 5 mg IV every 6 hours as needed  Continue to monitor BP

## 2019-02-15 NOTE — CONSULTS
The patient's Vancomycin therapy has been discontinued  Thank you for this consult; pharmacy will sign off now

## 2019-02-15 NOTE — PLAN OF CARE
Problem: DISCHARGE PLANNING - CARE MANAGEMENT  Goal: Discharge to post-acute care or home with appropriate resources  Description  INTERVENTIONS:  - Conduct assessment to determine patient/family and health care team treatment goals, and need for post-acute services based on payer coverage, community resources, and patient preferences, and barriers to discharge  - Address psychosocial, clinical, and financial barriers to discharge as identified in assessment in conjunction with the patient/family and health care team  - Arrange appropriate level of post-acute services according to patient?s   needs and preference and payer coverage in collaboration with the physician and health care team  - Communicate with and update the patient/family, physician, and health care team regarding progress on the discharge plan  - Arrange appropriate transportation to post-acute venues  - Patient will return to [facility] upon discharge   Outcome: Completed

## 2019-02-15 NOTE — DISCHARGE INSTR - DIET
Level 1 puree diet w/ HTL by tsp to cup sip  Feed pt  Aspiration precautions    Richard Chan MA, CCC/SLP  [id number]  [email or web address]  Available via [email or web address] text

## 2019-02-16 PROBLEM — R78.81 BACTEREMIA DUE TO GRAM-NEGATIVE BACTERIA: Status: ACTIVE | Noted: 2019-02-14

## 2019-02-16 LAB
ALBUMIN SERPL BCP-MCNC: 3.1 G/DL (ref 3.5–5.7)
ALP SERPL-CCNC: 41 U/L (ref 55–165)
ALT SERPL W P-5'-P-CCNC: 8 U/L (ref 7–52)
ANION GAP SERPL CALCULATED.3IONS-SCNC: 9 MMOL/L (ref 4–13)
AST SERPL W P-5'-P-CCNC: 8 U/L (ref 13–39)
ATRIAL RATE: 122 BPM
BACTERIA UR CULT: ABNORMAL
BILIRUB SERPL-MCNC: 0.4 MG/DL (ref 0.2–1)
BUN SERPL-MCNC: 36 MG/DL (ref 7–25)
CALCIUM SERPL-MCNC: 9 MG/DL (ref 8.6–10.5)
CHLORIDE SERPL-SCNC: 112 MMOL/L (ref 98–107)
CO2 SERPL-SCNC: 24 MMOL/L (ref 21–31)
CREAT SERPL-MCNC: 0.89 MG/DL (ref 0.6–1.2)
ERYTHROCYTE [DISTWIDTH] IN BLOOD BY AUTOMATED COUNT: 14.5 % (ref 11.5–14.5)
GFR SERPL CREATININE-BSD FRML MDRD: 62 ML/MIN/1.73SQ M
GLUCOSE SERPL-MCNC: 255 MG/DL (ref 65–140)
GLUCOSE SERPL-MCNC: 278 MG/DL (ref 65–140)
GLUCOSE SERPL-MCNC: 294 MG/DL (ref 65–140)
GLUCOSE SERPL-MCNC: 329 MG/DL (ref 65–140)
GLUCOSE SERPL-MCNC: 335 MG/DL (ref 65–140)
GLUCOSE SERPL-MCNC: 338 MG/DL (ref 65–99)
GLUCOSE SERPL-MCNC: 341 MG/DL (ref 65–140)
HCT VFR BLD AUTO: 31.2 % (ref 42–47)
HGB BLD-MCNC: 10.3 G/DL (ref 12–16)
MCH RBC QN AUTO: 33.6 PG (ref 26–34)
MCHC RBC AUTO-ENTMCNC: 32.9 G/DL (ref 31–37)
MCV RBC AUTO: 102 FL (ref 81–99)
NRBC BLD AUTO-RTO: 0 /100 WBCS
P AXIS: 72 DEGREES
PLATELET # BLD AUTO: 210 THOUSANDS/UL (ref 149–390)
PMV BLD AUTO: 9.9 FL (ref 8.6–11.7)
POTASSIUM SERPL-SCNC: 3.9 MMOL/L (ref 3.5–5.5)
PR INTERVAL: 154 MS
PROT SERPL-MCNC: 6.6 G/DL (ref 6.4–8.9)
QRS AXIS: -26 DEGREES
QRSD INTERVAL: 86 MS
QT INTERVAL: 310 MS
QTC INTERVAL: 441 MS
RBC # BLD AUTO: 3.06 MILLION/UL (ref 3.9–5.2)
SODIUM SERPL-SCNC: 145 MMOL/L (ref 134–143)
T WAVE AXIS: 200 DEGREES
VENTRICULAR RATE: 122 BPM
WBC # BLD AUTO: 9.8 THOUSAND/UL (ref 4.8–10.8)

## 2019-02-16 PROCEDURE — 82948 REAGENT STRIP/BLOOD GLUCOSE: CPT

## 2019-02-16 PROCEDURE — 93010 ELECTROCARDIOGRAM REPORT: CPT | Performed by: INTERNAL MEDICINE

## 2019-02-16 PROCEDURE — 99232 SBSQ HOSP IP/OBS MODERATE 35: CPT | Performed by: NURSE PRACTITIONER

## 2019-02-16 PROCEDURE — 80053 COMPREHEN METABOLIC PANEL: CPT | Performed by: INTERNAL MEDICINE

## 2019-02-16 PROCEDURE — 94760 N-INVAS EAR/PLS OXIMETRY 1: CPT

## 2019-02-16 PROCEDURE — 94664 DEMO&/EVAL PT USE INHALER: CPT

## 2019-02-16 RX ORDER — METHYLPREDNISOLONE SODIUM SUCCINATE 40 MG/ML
40 INJECTION, POWDER, LYOPHILIZED, FOR SOLUTION INTRAMUSCULAR; INTRAVENOUS DAILY
Status: DISCONTINUED | OUTPATIENT
Start: 2019-02-17 | End: 2019-02-18

## 2019-02-16 RX ORDER — HYDRALAZINE HYDROCHLORIDE 20 MG/ML
10 INJECTION INTRAMUSCULAR; INTRAVENOUS EVERY 6 HOURS SCHEDULED
Status: DISCONTINUED | OUTPATIENT
Start: 2019-02-16 | End: 2019-02-19

## 2019-02-16 RX ORDER — POLYETHYLENE GLYCOL 3350 17 G/17G
17 POWDER, FOR SOLUTION ORAL DAILY
Status: DISCONTINUED | OUTPATIENT
Start: 2019-02-17 | End: 2019-02-19

## 2019-02-16 RX ORDER — INSULIN GLARGINE 100 [IU]/ML
30 INJECTION, SOLUTION SUBCUTANEOUS
Status: DISCONTINUED | OUTPATIENT
Start: 2019-02-16 | End: 2019-02-19

## 2019-02-16 RX ORDER — INSULIN GLARGINE 100 [IU]/ML
10 INJECTION, SOLUTION SUBCUTANEOUS ONCE
Status: COMPLETED | OUTPATIENT
Start: 2019-02-16 | End: 2019-02-16

## 2019-02-16 RX ORDER — ACETAMINOPHEN 650 MG/1
650 SUPPOSITORY RECTAL EVERY 4 HOURS PRN
Status: DISCONTINUED | OUTPATIENT
Start: 2019-02-16 | End: 2019-02-21 | Stop reason: HOSPADM

## 2019-02-16 RX ADMIN — METOPROLOL TARTRATE 5 MG: 5 INJECTION, SOLUTION INTRAVENOUS at 19:50

## 2019-02-16 RX ADMIN — METOPROLOL TARTRATE 5 MG: 5 INJECTION, SOLUTION INTRAVENOUS at 23:41

## 2019-02-16 RX ADMIN — FAMOTIDINE 20 MG: 10 INJECTION, SOLUTION INTRAVENOUS at 20:25

## 2019-02-16 RX ADMIN — METOPROLOL TARTRATE 5 MG: 5 INJECTION, SOLUTION INTRAVENOUS at 00:59

## 2019-02-16 RX ADMIN — HYDRALAZINE HYDROCHLORIDE 10 MG: 20 INJECTION INTRAMUSCULAR; INTRAVENOUS at 23:41

## 2019-02-16 RX ADMIN — ACETAMINOPHEN 650 MG: 650 SUPPOSITORY RECTAL at 23:24

## 2019-02-16 RX ADMIN — METOPROLOL TARTRATE 5 MG: 5 INJECTION, SOLUTION INTRAVENOUS at 12:15

## 2019-02-16 RX ADMIN — METRONIDAZOLE 500 MG: 500 INJECTION, SOLUTION INTRAVENOUS at 01:44

## 2019-02-16 RX ADMIN — DEXTROSE AND SODIUM CHLORIDE 75 ML/HR: 5; 450 INJECTION, SOLUTION INTRAVENOUS at 20:50

## 2019-02-16 RX ADMIN — HEPARIN SODIUM 5000 UNITS: 5000 INJECTION INTRAVENOUS; SUBCUTANEOUS at 06:23

## 2019-02-16 RX ADMIN — HYDRALAZINE HYDROCHLORIDE 10 MG: 20 INJECTION INTRAMUSCULAR; INTRAVENOUS at 18:50

## 2019-02-16 RX ADMIN — METHYLPREDNISOLONE SODIUM SUCCINATE 40 MG: 40 INJECTION, POWDER, FOR SOLUTION INTRAMUSCULAR; INTRAVENOUS at 10:45

## 2019-02-16 RX ADMIN — ACETAMINOPHEN 650 MG: 650 SUPPOSITORY RECTAL at 05:37

## 2019-02-16 RX ADMIN — ANORECTAL OINTMENT: 15.7; .44; 24; 20.6 OINTMENT TOPICAL at 10:44

## 2019-02-16 RX ADMIN — HEPARIN SODIUM 5000 UNITS: 5000 INJECTION INTRAVENOUS; SUBCUTANEOUS at 21:14

## 2019-02-16 RX ADMIN — METOPROLOL TARTRATE 5 MG: 5 INJECTION, SOLUTION INTRAVENOUS at 06:21

## 2019-02-16 RX ADMIN — CEFEPIME HYDROCHLORIDE 2000 MG: 2 INJECTION, SOLUTION INTRAVENOUS at 10:41

## 2019-02-16 RX ADMIN — INSULIN GLARGINE 10 UNITS: 100 INJECTION, SOLUTION SUBCUTANEOUS at 10:44

## 2019-02-16 RX ADMIN — ANORECTAL OINTMENT: 15.7; .44; 24; 20.6 OINTMENT TOPICAL at 18:50

## 2019-02-16 RX ADMIN — INSULIN LISPRO 3 UNITS: 100 INJECTION, SOLUTION INTRAVENOUS; SUBCUTANEOUS at 21:15

## 2019-02-16 RX ADMIN — HYDRALAZINE HYDROCHLORIDE 10 MG: 20 INJECTION INTRAMUSCULAR; INTRAVENOUS at 10:42

## 2019-02-16 RX ADMIN — CEFEPIME HYDROCHLORIDE 2000 MG: 2 INJECTION, SOLUTION INTRAVENOUS at 21:14

## 2019-02-16 RX ADMIN — METRONIDAZOLE 500 MG: 500 INJECTION, SOLUTION INTRAVENOUS at 12:01

## 2019-02-16 RX ADMIN — HEPARIN SODIUM 5000 UNITS: 5000 INJECTION INTRAVENOUS; SUBCUTANEOUS at 16:49

## 2019-02-16 RX ADMIN — INSULIN GLARGINE 30 UNITS: 100 INJECTION, SOLUTION SUBCUTANEOUS at 21:15

## 2019-02-16 RX ADMIN — DEXTROSE AND SODIUM CHLORIDE 75 ML/HR: 5; 450 INJECTION, SOLUTION INTRAVENOUS at 01:44

## 2019-02-16 RX ADMIN — FAMOTIDINE 20 MG: 10 INJECTION, SOLUTION INTRAVENOUS at 10:59

## 2019-02-16 RX ADMIN — METRONIDAZOLE 500 MG: 500 INJECTION, SOLUTION INTRAVENOUS at 18:50

## 2019-02-16 NOTE — PROGRESS NOTES
Progress Note Zenaida Baumgarten [place]olo[place]ick 1940, 66 y o  female MRN: [id number]    Unit/Bed#: -02 Encounter: [id number]    Primary Care Provider: Reva Hunt DO   Date and time admitted to hospital: 2/14/2019  7:25 AM        * Bacteremia due to Gram-negative bacteria  Assessment & Plan  · Patient meets criteria for sepsis with tachycardia, tachypnea, leukocytosis   · Initially suspected to be secondary to aspiration pneumonia; however, has no coughing or sputum production  Source of bacteremia is likely from urine  · Will consult ID  · Continue with cefepime and flagyl at this time  Pending sensitivities  · Pending sputum culture, urine culture, and blood cultures  Noted that urine culture was obtained after the patient received 1 dose of vancomycin  · Follow-up procalcitonin  · IV fluids    Pneumonia due to infectious organism  Assessment & Plan  · Initially aspiration pneumonia was suspected with history of CVA and dysphagia    · Repeated imaging 2/15 shows atelectasis   · Pneumonia is unlikely as patient does not have cough or sputum production   · Blood cultures + 2/2 for gram negative rods suspected urine as the source rather than pneumonia   · Continue with cefepime and Flagyl until seen by ID   · Patient is currently NPO due to aspiration  · Speech therapy is recommending PEG tube placement versus hospice in light of significant aspiration  · All of these were conveyed to the patient's daughter Donna Garcia cell phone number [phone], home phone number [phone]  · She would like to think about her options, and will be coming up tomorrow to see her mother at which time she will make a decision regarding PEG tube versus hospice  · Otherwise continue with above-mentioned medical treatment    Acute on chronic respiratory failure with hypoxia ([address] )  Assessment & Plan  · Suspected to be secondary to acute infection  · The patient uses 2 L oxygen chronically  · Improving  · Respiratory protocol  · Continue treatment stated above    COPD with acute exacerbation ([facility] [address] )  Assessment & Plan  · Acute exacerbation appears to be improving  · Titrate down on steroids  · Continue treatment stated above  Other dysphagia  Assessment & Plan  · Appreciate speech input  · Keep NPO  · Aspiration precautions  · Decision regarding PEG tube versus hospice pending daughters decision as noted above      Pressure ulcer of left buttock, stage 2  Assessment & Plan  · Present on admission  · Wound care input appreciated   · Wound culture pending  · Continue with local care with frequent turn and reposition    Type 2 diabetes mellitus with complication, without long-term current use of insulin [facility])  Assessment & Plan  No results found for: HGBA1C    Recent Labs     02/15/19  1838 02/15/19  2157 02/16/19  0035 02/16/19  0604   POCGLU 293* 307* 278* 335*       Blood Sugar Average: Last 72 hrs:  (P) 286 8   · Resume Lantus   · Will increase dose as BG has been elevated   · Continue on insulin sliding scale  · Will monitor closely as she will be on Solu-Medrol  Hypernatremia  Assessment & Plan  · This is related to poor oral intake in free water deficit  · IV fluids  · Improving  · Nephrology is following   · Will follow up with labs          VTE Pharmacologic Prophylaxis: Pharmacologic: Heparin    Patient Centered Rounds: I have performed bedside rounds with nursing staff today  Discussions with Specialists or Other Care Team Provider:  Nursing, case management, PT and OT  Education and Discussions with Family / Patient: daughter     Time Spent for Care: 30 minutes  More than 50% of total time spent on counseling and coordination of care as described above  Current Length of Stay: 2 day(s)    Current Patient Status: Inpatient   Certification Statement: The patient will continue to require additional inpatient hospital stay due to Bacteremia    Discharge Plan: pending hospital course   Pending daughter's visit    Code Status: Level 1 - Full Code    Subjective:   Reports HA  JORDAN is much better now  Denies any pain  Patient states "I like your sweater"    Objective:     Vitals:   Temp (24hrs), Av 9 °F (37 2 °C), Min:98 °F (36 7 °C), Max:99 6 °F (37 6 °C)    Temp:  [98 °F (36 7 °C)-99 6 °F (37 6 °C)] 99 5 °F (37 5 °C)  HR:  [77-91] 77  Resp:  [16-26] 16  BP: (152-200)/(72-97) 198/78  SpO2:  [95 %-99 %] 95 %  Body mass index is 27 22 kg/m²  Input and Output Summary (last 24 hours): Intake/Output Summary (Last 24 hours) at 2019 1058  Last data filed at 2019 0239  Gross per 24 hour   Intake 192 5 ml   Output 2150 ml   Net -1957 5 ml       Physical Exam:     Physical Exam   Constitutional: She appears well-developed  Chronically ill and frail elderly female     HENT:   Head: Normocephalic and atraumatic  Mouth/Throat: Oropharynx is clear and moist    Eyes: Pupils are equal, round, and reactive to light  Conjunctivae and EOM are normal    Neck: Normal range of motion  Neck supple  Cardiovascular: Normal rate, regular rhythm and normal heart sounds  Exam reveals no gallop and no friction rub  No murmur heard  Pulmonary/Chest: Effort normal  She has no wheezes  She has rales (fine in bases)  Abdominal: Soft  Bowel sounds are normal  She exhibits no distension  There is no tenderness  There is no rebound  Musculoskeletal: She exhibits edema (trace- +1 edema BLEs)  She exhibits no tenderness or deformity  +4/5 upper extremities strength, +3/5 lower extremities strength  Neurological: She is alert  No cranial nerve deficit  Answers yes/no questions appropriately  Follows simple commands     Skin: Skin is warm and dry  No rash noted  No erythema  Psychiatric: She has a normal mood and affect  Her behavior is normal    Vitals reviewed        Additional Data:     Labs:    Results from last 7 days   Lab Units 19  0514 02/15/19  0503 19  0815   WBC Thousand/uL 9 80 7 90 12 30* HEMOGLOBIN g/dL 10 3* 10 3* 11 0*   HEMATOCRIT % 31 2* 31 8* 34 5*   PLATELETS Thousands/uL 210 176 209   NEUTROS PCT %  --   --  81*   LYMPHS PCT %  --   --  9*   LYMPHO PCT %  --  9*  --    MONOS PCT %  --   --  9   MONO PCT %  --  2*  --    EOS PCT %  --   --  1     Results from last 7 days   Lab Units 02/16/19  0514   POTASSIUM mmol/L 3 9   CHLORIDE mmol/L 112*   CO2 mmol/L 24   BUN mg/dL 36*   CREATININE mg/dL 0 89   CALCIUM mg/dL 9 0   ALK PHOS U/L 41*   ALT U/L 8   AST U/L 8*     Results from last 7 days   Lab Units 02/14/19  0815   INR  1 22     Results from last 7 days   Lab Units 02/16/19  0604 02/16/19  0035 02/15/19  2157 02/15/19  1838 02/15/19  1555 02/15/19  1123 02/15/19  0603 02/14/19  2144 02/14/19  1726 02/14/19  1149   POC GLUCOSE mg/dl 335* 278* 307* 293* 304* 315* 237* 247* 284* 268*           * I Have Reviewed All Lab Data Listed Above  * Additional Pertinent Lab Tests Reviewed:  [address] Admission  Reviewed    Imaging:  Imaging Reports Reviewed Today Include: n/a     Recent Cultures (last 7 days):     Results from last 7 days   Lab Units 02/14/19  1404 02/14/19  1323 02/14/19  1246 02/14/19  0835 02/14/19  0815   BLOOD CULTURE   --   --   --   --  Non lactose fermenting gram negative judson*   GRAM STAIN RESULT  No polys seen*  Rare Gram negative rods*  --   --  Gram negative rods* Gram negative rods*   URINE CULTURE   --  >100,000 cfu/ml Gram Negative Judson*  >100,000 cfu/ml Gram Negative Judson*  --   --   --    WOUND CULTURE  Culture too young- will reincubate  --   --   --   --    INFLUENZA B PCR   --   --   --   --  Not Detected   RSV PCR   --   --   --   --  Not Detected   LEGIONELLA URINARY ANTIGEN   --   --  Negative  --   --        Last 24 Hours Medication List:     Current Facility-Administered Medications:  acetaminophen 650 mg Rectal Q4H PRN Gregory Cowing, PA-C    acetaminophen 650 mg Oral Q6H PRN Brian Alvarez MD    albuterol 2 5 mg Nebulization Q4H PRN Erna Montelongo Jonathan Greco MD    amLODIPine 10 mg Oral Daily Milady Arango MD    ammonium lactate  Topical BID PRN Milady Arango MD    aspirin 81 mg Oral Daily Milady Arango MD    atorvastatin 10 mg Oral Daily Milady Arango MD    bisacodyl 10 mg Oral Daily PRN Milady Arango MD    cefepime 2,000 mg Intravenous Q12H Milady Arango MD Last Rate: 2,000 mg (02/16/19 1041)   dextrose 5 % and sodium chloride 0 45 % 75 mL/hr Intravenous Continuous Milady Arango MD Last Rate: 75 mL/hr (02/16/19 0144)   famotidine 20 mg Intravenous Q12H [address] Emmanuelffry Jesse, CRSHANIA    ferrous sulfate 325 mg Oral TID With Meals Milady Arango MD    gabapentin 100 mg Oral BID Milady Arango MD    heparin (porcine) 5,000 Units Subcutaneous [facility] Milady Arango MD    hydrALAZINE 10 mg Intravenous Q6H [address] Emmanuelffry Jesse, CRSHANIA    HYDROcodone-acetaminophen 1 tablet Oral Q6H PRN Milady Arango MD    insulin glargine 30 Units Subcutaneous HS ALY Rutherford    insulin lispro 1-5 Units Subcutaneous HS Milady Arango MD    insulin lispro 1-6 Units Subcutaneous Jessica Dickerson MD    isosorbide dinitrate 30 mg Oral BID after meals Milady Arango MD    LORazepam 0 5 mg Oral Q8H PRN Milady Arango MD    megestrol 200 mg Oral Daily Milady Arango MD    menthol-zinc oxide  Topical BID Milady Arango MD    methylPREDNISolone sodium succinate 40 mg Intravenous Q12H 300 Alexis Phelps MD    metoprolol 5 mg Intravenous Q6H PRN Milady Arango MD    metoprolol 5 mg Intravenous Q6H ALY Rutherford    metroNIDAZOLE 500 mg Intravenous Jessica Dickerson MD Last Rate: 500 mg (02/16/19 0144)   morphine injection 1 mg Intravenous Once Harvinder Jimenez MD    [START ON 2/17/2019] polyethylene glycol 17 g Oral Daily Milady Arango MD    senna 1 tablet Oral Daily Milady Arango MD    sertraline 50 mg Oral Daily Milady Arango MD    tiotropium 18 mcg Inhalation Daily Milady Arango MD         Today, Patient Was Seen By: ALY Rutherford    ** Please Note: Dictation voice to text software may have been used in the creation of this document   **

## 2019-02-16 NOTE — ASSESSMENT & PLAN NOTE
No results found for: HGBA1C    Recent Labs     02/15/19  1838 02/15/19  2157 02/16/19  0035 02/16/19  0604   POCGLU 293* 307* 278* 335*       Blood Sugar Average: Last 72 hrs:  (P) 286 8   · Resume Lantus   · Will increase dose as BG has been elevated   · Continue on insulin sliding scale  · Will monitor closely as she will be on Solu-Medrol

## 2019-02-16 NOTE — ASSESSMENT & PLAN NOTE
· Initially aspiration pneumonia was suspected with history of CVA and dysphagia    · Repeated imaging 2/15 shows atelectasis   · Pneumonia is unlikely as patient does not have cough or sputum production   · Blood cultures + 2/2 for gram negative rods suspected urine as the source rather than pneumonia   · Continue with cefepime and Flagyl until seen by ID   · Patient is currently NPO due to aspiration  · Speech therapy is recommending PEG tube placement versus hospice in light of significant aspiration  · All of these were conveyed to the patient's daughter Marcille Seip cell phone number [phone], home phone number [phone]  · She would like to think about her options, and will be coming up tomorrow to see her mother at which time she will make a decision regarding PEG tube versus hospice  · Otherwise continue with above-mentioned medical treatment

## 2019-02-16 NOTE — PHYSICIAN ADVISOR
Current patient class: Inpatient  The patient is currently on Hospital Day: 2 at [address]      The patient was admitted to the hospital at [facility] on 2/14/19 for the following diagnosis:  Shortness of breath [R06 02]  Bronchospasm [J98 01]  Hypoxia [R09 02]  Sepsis, due to unspecified organism ([address] ) [A41 9]  Pneumonia of right lower lobe due to infectious organism ([facility] [address] ) [J18 1]       There is documentation in the medical record of an expected length of stay of at least 2 midnights  The patient is therefore expected to satisfy the 2 midnight benchmark and given the 2 midnight presumption is appropriate for INPATIENT ADMISSION  Given this expectation of a satisfying stay, CMS instructs us that the patient is most often appropriate for inpatient admission under part A provided medical necessity is documented in the chart  After review of the relevant documentation, labs, vital signs and test results, the patient is appropriate for INPATIENT ADMISSION  Admission to the hospital as an inpatient is a complex decision making process which requires the practitioner to consider the patients presenting complaint, history and physical examination and all relevant testing  With this in mind, in this case, the patient was deemed appropriate for INPATIENT ADMISSION  After review of the documentation and testing available at the time of the admission I concur with this clinical determination of medical necessity  Rationale is as follows: The patient is a 66 yrs old Female who presented to the ED at 2/14/2019  7:25 AM with a chief complaint of Shortness of Breath (From [address] home for Shortness of Breath starting this morning  Patient inital SpO2 was 80 on 2 L chronic )     Given the need for further hospitalization, and along with the documentation of medical necessity present in the chart, the patient is appropriate for inpatient admission    The patient is expected to satisfy the 2 midnight benchmark, and will require further acute medical care  The patient does have comorbid conditions which increases the risk for significant adverse outcome  Given this the patient is appropriate for inpatient admission        The patients vitals on arrival were ED Triage Vitals   Temperature Pulse Respirations Blood Pressure SpO2   02/14/19 0736 02/14/19 0736 02/14/19 0736 02/14/19 0736 02/14/19 0733   (!) 97 3 °F (36 3 °C) (!) 122 (!) 32 126/63 (!) 80 %      Temp Source Heart Rate Source Patient Position - Orthostatic VS BP Location FiO2 (%)   02/14/19 0736 02/14/19 0736 02/14/19 0736 02/14/19 0736 --   Temporal Monitor Lying Left arm       Pain Score       02/14/19 0736       No Pain           Past Medical History:   Diagnosis Date    Anemia     CHF (congestive heart failure) ([facility])     COPD (chronic obstructive pulmonary disease) ([facility])     home 02 2l    Diabetes mellitus ([facility])     DJD (degenerative joint disease)     Gait abnormality     GERD (gastroesophageal reflux disease)     Hyperlipidemia     Hypertension     Stroke ([facility] [address] )      Past Surgical History:   Procedure Laterality Date    ABDOMINAL SURGERY             Consults have been placed to:   IP CONSULT TO CASE MANAGEMENT  IP CONSULT TO PHARMACY  IP CONSULT TO WOUND CARE  IP CONSULT TO NEPHROLOGY    Vitals:    02/15/19 0927 02/15/19 1200 02/15/19 1527 02/15/19 2100   BP: (!) 203/91 155/72 152/80    BP Location:  Left arm Left arm    Pulse:  82 85    Resp:  (!) 26 17    Temp:  99 1 °F (37 3 °C) 99 6 °F (37 6 °C)    TempSrc:  Tympanic Temporal    SpO2:  96% 99% 97%   Weight:       Height:           Most recent labs:    Recent Labs     02/14/19  0815 02/14/19  1242 02/15/19  0503   WBC 12 30*  --  7 90   HGB 11 0*  --  10 3*   HCT 34 5*  --  31 8*     --  176   K 4 0  --  5 0   CALCIUM 9 5  --  9 0   BUN 41*  --  37*   CREATININE 1 23*  --  0 96   INR 1 22  --   --    TROPONINI 0 04* 0 08*  --    AST  --   -- 14   ALT  --   --  10   ALKPHOS  --   --  41*       Scheduled Meds:  Current Facility-Administered Medications:  acetaminophen 650 mg Oral Q6H PRN Marciano Gao MD    albuterol 2 5 mg Nebulization Q4H PRN Marciano Gao MD    amLODIPine 10 mg Oral Daily Marciano Gao MD    ammonium lactate  Topical BID PRN Marciano Gao MD    aspirin 81 mg Oral Daily Marciano Gao MD    atorvastatin 10 mg Oral Daily Marciano Gao MD    bisacodyl 10 mg Oral Daily PRN Marciano Gao MD    cefepime 2,000 mg Intravenous Q12H Marciano Gao MD Last Rate: 2,000 mg (02/15/19 2154)   dextrose 5 % and sodium chloride 0 45 % 75 mL/hr Intravenous Continuous Marciano Gao MD Last Rate: 75 mL/hr (02/15/19 1200)   famotidine 20 mg Oral BID Marciano Gao MD    ferrous sulfate 325 mg Oral TID With Meals Marciano Gao MD    gabapentin 100 mg Oral BID Marciano Gao MD    heparin (porcine) 5,000 Units Subcutaneous [facility] Marciano Gao MD    hydrALAZINE 10 mg Intravenous Q6H PRN Marciano Gao MD    HYDROcodone-acetaminophen 1 tablet Oral Q6H PRN Marciano Gao MD    insulin glargine 20 Units Subcutaneous HS ALY [place]ak    insulin lispro 1-5 Units Subcutaneous HS Marciano Gao MD    insulin lispro 1-6 Units Subcutaneous Q6H Marciano Gao MD    isosorbide dinitrate 30 mg Oral BID after meals Marciano Gao MD    LORazepam 0 5 mg Oral Q8H PRN Marciano Gao MD    megestrol 200 mg Oral Daily Marciano Gao MD    menthol-zinc oxide  Topical BID Marciano Gao MD    methylPREDNISolone sodium succinate 40 mg Intravenous Q12H 300 Alexis Phelps MD    metoprolol 5 mg Intravenous Q6H PRN Marciano Gao MD    metoprolol 5 mg Intravenous Q6H ALY [place]ak    metroNIDAZOLE 500 mg Intravenous Cuong Aldana MD Last Rate: 500 mg (02/15/19 1852)   polyethylene glycol 17 g Oral Daily Marciano Gao MD    senna 1 tablet Oral Daily Marciano Gao MD    sertraline 50 mg Oral Daily Marciano Gao MD    tiotropium 18 mcg Inhalation Daily Marciano Gao MD      Continuous Infusions:  dextrose 5 % and sodium chloride 0 45 % 75 mL/hr Last Rate: 75 mL/hr (02/15/19 1200)     PRN Meds:   acetaminophen    albuterol    ammonium lactate    bisacodyl    hydrALAZINE    HYDROcodone-acetaminophen    LORazepam    metoprolol    Surgical procedures (if appropriate):

## 2019-02-16 NOTE — ASSESSMENT & PLAN NOTE
· Patient meets criteria for sepsis with tachycardia, tachypnea, leukocytosis   · Initially suspected to be secondary to aspiration pneumonia; however, has no coughing or sputum production  Source of bacteremia is likely from urine  · Will consult ID  · Continue with cefepime and flagyl at this time  Pending sensitivities  · Pending sputum culture, urine culture, and blood cultures  Noted that urine culture was obtained after the patient received 1 dose of vancomycin     · Follow-up procalcitonin  · IV fluids

## 2019-02-16 NOTE — PROGRESS NOTES
Progress Note - Nephrology   Tien Sanchez Bejarano 66 y o  female MRN: [id number]  Unit/Bed#: -02 Encounter: [id number]    A/P:  1  RUTH ANN: has resolved, will continue to monitor   2  Hypertension: uncontrolled at this time, due to aspiration precautions and npo status is not receiving po medications, continue to monitor and administer iv medications as ordered  3  Bacteremia: blood cultures remain positive continue iv abx  4  Hypernatremia: remains slightly elevated, will continue to monitor and continue ivf       Follow up reason for today's visit: RUTH ANN/hypernatremia     Bacteremia due to Gram-negative bacteria    Patient Active Problem List   Diagnosis    Bacteremia due to Gram-negative bacteria    Acute on chronic respiratory failure with hypoxia ([facility] )    Pneumonia due to infectious organism    Bronchospasm    Hypernatremia    Renal insufficiency    COPD with acute exacerbation ([facility] )    Type 2 diabetes mellitus with complication, without long-term current use of insulin (HCC)    Pressure ulcer of left buttock, stage 2    Other dysphagia         Subjective:   Patient is in no acute distress  Has reported a headache today, otherwise ROS negative     Objective:     Vitals: Blood pressure (!) 180/92, pulse 77, temperature 99 5 °F (37 5 °C), temperature source Temporal, resp  rate 16, height 5' 8" (1 727 m), weight 81 2 kg (179 lb), SpO2 95 %, not currently breastfeeding  ,Body mass index is 27 22 kg/m²  Weight (last 2 days)     Date/Time   Weight    02/14/19 0736   81 2 (179)                Intake/Output Summary (Last 24 hours) at 2/16/2019 1430  Last data filed at 2/16/2019 1215  Gross per 24 hour   Intake    Output 2500 ml   Net -2500 ml     I/O last 3 completed shifts: In: 1930 [I V :1330; IV Piggyback:600]  Out: 3250 [Urine:3250]    Urethral Catheter Non-latex 16 Fr   (Active)   Amt returned on insertion(mL) 200 mL 2/14/2019 12:15 PM   Site Assessment Clean;Skin intact 2/15/2019  7:50 PM Collection Container Standard drainage bag 2/15/2019  7:50 PM   Securement Method Securing device (Describe) 2/15/2019  7:50 PM   Output (mL) 550 mL 2/16/2019  2:39 AM       Physical Exam: BP (!) 180/92 (BP Location: Left arm)   Pulse 77   Temp 99 5 °F (37 5 °C) (Temporal)   Resp 16   Ht 5' 8" (1 727 m)   Wt 81 2 kg (179 lb)   LMP  (LMP Unknown)   SpO2 95%   Breastfeeding? No   BMI 27 22 kg/m²     General Appearance:    Alert, cooperative, no distress, appears stated age   Head:    Normocephalic, without obvious abnormality, atraumatic   Eyes:    Conjunctiva/corneas clear   Ears:    Normal external ears   Nose:   Nares normal, septum midline, mucosa normal, no drainage    or sinus tenderness   Throat:   Lips, mucosa, and tongue normal; teeth and gums normal   Neck:   Supple, symmetrical, trachea midline, no adenopathy;        thyroid:  No enlargement/tenderness/nodules; no carotid    bruit or JVD   Back:     Symmetric, no curvature, ROM normal, no CVA tenderness   Lungs:     Clear to auscultation bilaterally, respirations unlabored   Chest wall:    No tenderness or deformity   Heart:    Regular rate and rhythm, S1 and S2 normal, no murmur, rub   or gallop   Abdomen:     Soft, non-tender, bowel sounds active   Extremities:   Extremities normal, atraumatic, positive lower ext edema    Skin:   Skin color, texture, turgor normal, no rashes or lesions   Lymph nodes:   Cervical normal   Neurologic:   CNII-XII intact            Lab, Imaging and other studies: I have personally reviewed pertinent labs    CBC:   Lab Results   Component Value Date    WBC 9 80 02/16/2019    HGB 10 3 (L) 02/16/2019    HCT 31 2 (L) 02/16/2019     (H) 02/16/2019     02/16/2019    MCH 33 6 02/16/2019    MCHC 32 9 02/16/2019    RDW 14 5 02/16/2019    MPV 9 9 02/16/2019    NRBC 0 02/16/2019     CMP:   Lab Results   Component Value Date    K 3 9 02/16/2019     (H) 02/16/2019    CO2 24 02/16/2019    BUN 36 (H) 02/16/2019 CREATININE 0 89 02/16/2019    CALCIUM 9 0 02/16/2019    AST 8 (L) 02/16/2019    ALT 8 02/16/2019    ALKPHOS 41 (L) 02/16/2019    EGFR 62 02/16/2019         Results from last 7 days   Lab Units 02/16/19  0514 02/15/19  0503 02/14/19  0815   POTASSIUM mmol/L 3 9 5 0 4 0   CHLORIDE mmol/L 112* 115* 110*   CO2 mmol/L 24 26 27   BUN mg/dL 36* 37* 41*   CREATININE mg/dL 0 89 0 96 1 23*   CALCIUM mg/dL 9 0 9 0 9 5   ALK PHOS U/L 41* 41*  --    ALT U/L 8 10  --    AST U/L 8* 14  --          Phosphorus: No results found for: PHOS  Magnesium: No results found for: MG  Urinalysis: No results found for: COLORU, CLARITYU, SPECGRAV, PHUR, LEUKOCYTESUR, NITRITE, PROTEINUA, GLUCOSEU, KETONESU, BILIRUBINUR, BLOODU  Ionized Calcium: No results found for: CAION  Coagulation: No results found for: PT, INR, APTT  Troponin: No results found for: TROPONINI  ABG: No results found for: PHART, ACU3KPP, PO2ART, DRV0REC, O3EFPXLA, BEART, SOURCE  Radiology review:     IMAGING  Procedure: Xr Chest Portable    Result Date: 2/15/2019  Narrative: CHEST INDICATION:   follow up ?aspiration pneumonia  COMPARISON:  X-ray same day and 1/4/15 EXAM PERFORMED/VIEWS:  XR CHEST PORTABLE FINDINGS: Cardiomediastinal silhouette appears unremarkable  Streaky right basal opacity, improved left basal aeration  No pneumothorax or pleural effusion  Osseous structures appear within normal limits for patient age  Impression: Right basal opacity, favor atelectasis Workstation performed: [id number]     Procedure: Xr Chest 1 View Portable    Result Date: 2/14/2019  Narrative: CHEST INDICATION:   sob  COMPARISON:  None EXAM PERFORMED/VIEWS:  XR CHEST PORTABLE FINDINGS: Heart shadow appears unremarkable  Atherosclerotic vascular calcifications are noted  Left lingular scarring/atelectasis  Minimal right base atelectasis  No pneumothorax or pleural effusion  Osseous structures appear within normal limits for patient age  Spinal fixation rods       Impression: Left lingular scarring/atelectasis  Minimal right base atelectasis  Workstation performed: [id number]     Procedure: Xr Sacrum And Coccyx    Result Date: 2/15/2019  Narrative: SACRUM AND COCCYX INDICATION:   wound  COMPARISON:  1/3/16 CT VIEWS:  XR SACRUM AND COCCYX  FINDINGS: Right femoral plate with proximal lag screw  Spinal fusion rods are partially visualized  Bones are severely osteopenic  Sacral arcuate lines are maintained  The SI joints appear symmetric  Pubic symphysis is maintained  Visualized lower lumbar spine is unremarkable  Impression: No radiographic evidence of osteomyelitis Workstation performed: [id number]     Procedure: Ct Head Wo Contrast    Result Date: 2/14/2019  Narrative: CT BRAIN - WITHOUT CONTRAST INDICATION:   Confusion/delirium, altered LOC, unexplained  COMPARISON:  7/22/2017 TECHNIQUE:  CT examination of the brain was performed  In addition to axial images, coronal 2D reformatted images were created and submitted for interpretation  Radiation dose length product (DLP) for this visit:  792 1 mGy-cm   This examination, like all CT scans performed in the [facility], was performed utilizing techniques to minimize radiation dose exposure, including the use of iterative reconstruction and automated exposure control  IMAGE QUALITY:  Diagnostic  FINDINGS: PARENCHYMA: Decreased attenuation is noted in periventricular and subcortical white matter demonstrating an appearance that is statistically most likely to represent moderate microangiopathic change  There is a stable right cerebellar infarct  There are  bilateral basal ganglia lacunar infarcts  No CT signs of acute infarction  No intracranial mass, mass effect or midline shift  No acute parenchymal hemorrhage  VENTRICLES AND EXTRA-AXIAL SPACES:  Normal for the patient's age  VISUALIZED ORBITS AND PARANASAL SINUSES:  Unremarkable   CALVARIUM AND EXTRACRANIAL SOFT TISSUES:  Normal      Impression: No acute intracranial abnormality  Microangiopathic changes with bilateral basal ganglia lacunar infarcts and a right cerebellar infarct, present previously   Workstation performed: [id number]       Current Facility-Administered Medications   Medication Dose Route Frequency    acetaminophen (TYLENOL) rectal suppository 650 mg  650 mg Rectal Q4H PRN    acetaminophen (TYLENOL) tablet 650 mg  650 mg Oral Q6H PRN    albuterol inhalation solution 2 5 mg  2 5 mg Nebulization Q4H PRN    amLODIPine (NORVASC) tablet 10 mg  10 mg Oral Daily    ammonium lactate (LAC-HYDRIN) 12 % lotion   Topical BID PRN    aspirin (ECOTRIN LOW STRENGTH) EC tablet 81 mg  81 mg Oral Daily    atorvastatin (LIPITOR) tablet 10 mg  10 mg Oral Daily    bisacodyl (DULCOLAX) EC tablet 10 mg  10 mg Oral Daily PRN    cefepime (MAXIPIME) IVPB (premix) 2,000 mg  2,000 mg Intravenous Q12H    dextrose 5 % and sodium chloride 0 45 % infusion  75 mL/hr Intravenous Continuous    famotidine (PEPCID) injection 20 mg  20 mg Intravenous Q12H [facility]    ferrous sulfate tablet 325 mg  325 mg Oral TID With Meals    gabapentin (NEURONTIN) capsule 100 mg  100 mg Oral BID    heparin (porcine) subcutaneous injection 5,000 Units  5,000 Units Subcutaneous Q8H [facility]    hydrALAZINE (APRESOLINE) injection 10 mg  10 mg Intravenous Q6H [facility]    HYDROcodone-acetaminophen (NORCO) 5-325 mg per tablet 1 tablet  1 tablet Oral Q6H PRN    insulin glargine (LANTUS) subcutaneous injection 30 Units 0 3 mL  30 Units Subcutaneous HS    insulin lispro (HumaLOG) 100 units/mL subcutaneous injection 1-5 Units  1-5 Units Subcutaneous HS    insulin lispro (HumaLOG) 100 units/mL subcutaneous injection 1-6 Units  1-6 Units Subcutaneous Q6H    isosorbide dinitrate (ISORDIL) tablet 30 mg  30 mg Oral BID after meals    LORazepam (ATIVAN) tablet 0 5 mg  0 5 mg Oral Q8H PRN    megestrol (MEGACE) oral suspension 200 mg  200 mg Oral Daily    menthol-zinc oxide (CALMOSEPTINE) 0 44-20 6 % ointment Topical BID    [START ON 2/17/2019] methylPREDNISolone sodium succinate (Solu-MEDROL) injection 40 mg  40 mg Intravenous Daily    metoprolol (LOPRESSOR) injection 5 mg  5 mg Intravenous Q6H PRN    metoprolol (LOPRESSOR) injection 5 mg  5 mg Intravenous Q6H    metroNIDAZOLE (FLAGYL) IVPB (premix) 500 mg  500 mg Intravenous Q8H    morphine injection 1 mg  1 mg Intravenous Once    [START ON 2/17/2019] polyethylene glycol (MIRALAX) packet 17 g  17 g Oral Daily    senna (SENOKOT) tablet 8 6 mg  1 tablet Oral Daily    sertraline (ZOLOFT) tablet 50 mg  50 mg Oral Daily    tiotropium (SPIRIVA) capsule for inhaler 18 mcg  18 mcg Inhalation Daily     Medications Discontinued During This Encounter   Medication Reason    ammonium lactate (LAC-HYDRIN) 12 % cream Formulary change    vancomycin (VANCOCIN) 1,250 mg in sodium chloride 0 9 % 250 mL IVPB Dose adjustment    albuterol inhalation solution 2 5 mg     insulin glargine (LANTUS) subcutaneous injection 46 Units 0 46 mL     insulin lispro (HumaLOG) 100 units/mL subcutaneous injection 1-6 Units     sodium chloride 0 9 % infusion     dextrose 5 % and sodium chloride 0 33 % infusion     piperacillin-tazobactam (ZOSYN) 2 25 g in sodium chloride 0 9 % 50 mL IVPB     vancomycin (VANCOCIN) 750 mg in sodium chloride 0 9 % 250 mL IVPB     methylPREDNISolone sodium succinate (Solu-MEDROL) injection 40 mg     metoprolol tartrate (LOPRESSOR) tablet 25 mg     insulin glargine (LANTUS) subcutaneous injection 10 Units 0 1 mL     acetaminophen (TYLENOL) rectal suppository 325 mg     morphine injection 1 mg     polyethylene glycol (GLYCOLAX) bowel prep 17 g Formulary change    hydrALAZINE (APRESOLINE) injection 10 mg     insulin glargine (LANTUS) subcutaneous injection 20 Units 0 2 mL     famotidine (PEPCID) tablet 20 mg     methylPREDNISolone sodium succinate (Solu-MEDROL) injection 40 mg        ALY Stanford

## 2019-02-16 NOTE — ASSESSMENT & PLAN NOTE
· This is related to poor oral intake in free water deficit  · IV fluids  · Improving  · Nephrology is following   · Will follow up with labs

## 2019-02-16 NOTE — PLAN OF CARE
Problem: Prexisting or High Potential for Compromised Skin Integrity  Goal: Skin integrity is maintained or improved  Description  INTERVENTIONS:  - Identify patients at risk for skin breakdown  - Assess and monitor skin integrity  - Assess and monitor nutrition and hydration status  - Monitor labs (i e  albumin)  - Assess for incontinence   - Turn and reposition patient  - Assist with mobility/ambulation  - Relieve pressure over bony prominences  - Avoid friction and shearing  - Provide appropriate hygiene as needed including keeping skin clean and dry  - Evaluate need for skin moisturizer/barrier cream  - Collaborate with interdisciplinary team (i e  Nutrition, Rehabilitation, etc )   - Patient/family teaching  Outcome: Progressing     Problem: Nutrition/Hydration-ADULT  Goal: Nutrient/Hydration intake appropriate for improving, restoring or maintaining nutritional needs  Description  Monitor and assess patient's nutrition/hydration status for malnutrition (ex- brittle hair, bruises, dry skin, pale skin and conjunctiva, muscle wasting, smooth red tongue, and disorientation)  Collaborate with interdisciplinary team and initiate plan and interventions as ordered  Monitor patient's weight and dietary intake as ordered or per policy  Utilize nutrition screening tool and intervene per policy  Determine patient's food preferences and provide high-protein, high-caloric foods as appropriate       INTERVENTIONS:  - Monitor oral intake, urinary output, labs, and treatment plans  - Assess nutrition and hydration status and recommend course of action  - Evaluate amount of meals eaten  - Assist patient with eating if necessary   - Allow adequate time for meals  - Recommend/ encourage appropriate diets, oral nutritional supplements, and vitamin/mineral supplements  - Order, calculate, and assess calorie counts as needed  - Recommend, monitor, and adjust tube feedings and TPN/PPN based on assessed needs  - Assess need for intravenous fluids  - Provide specific nutrition/hydration education as appropriate  - Include patient/family/caregiver in decisions related to nutrition  Outcome: Progressing     Problem: Potential for Falls  Goal: Patient will remain free of falls  Description  INTERVENTIONS:  - Assess patient frequently for physical needs  -  Identify cognitive and physical deficits and behaviors that affect risk of falls    -  [place] fall precautions as indicated by assessment   - Educate patient/family on patient safety including physical limitations  - Instruct patient to call for assistance with activity based on assessment  - Modify environment to reduce risk of injury  - Consider OT/PT consult to assist with strengthening/mobility  Outcome: Progressing

## 2019-02-17 PROBLEM — B96.20 BACTEREMIA DUE TO ESCHERICHIA COLI: Status: ACTIVE | Noted: 2019-02-14

## 2019-02-17 LAB
ALBUMIN SERPL BCP-MCNC: 3.1 G/DL (ref 3.5–5.7)
ALP SERPL-CCNC: 37 U/L (ref 55–165)
ALT SERPL W P-5'-P-CCNC: 9 U/L (ref 7–52)
ANION GAP SERPL CALCULATED.3IONS-SCNC: 9 MMOL/L (ref 4–13)
AST SERPL W P-5'-P-CCNC: 8 U/L (ref 13–39)
BACTERIA BLD CULT: ABNORMAL
BACTERIA BLD CULT: ABNORMAL
BACTERIA UR CULT: ABNORMAL
BACTERIA UR CULT: ABNORMAL
BACTERIA WND AEROBE CULT: ABNORMAL
BILIRUB SERPL-MCNC: 0.4 MG/DL (ref 0.2–1)
BUN SERPL-MCNC: 33 MG/DL (ref 7–25)
CALCIUM SERPL-MCNC: 8.8 MG/DL (ref 8.6–10.5)
CHLORIDE SERPL-SCNC: 113 MMOL/L (ref 98–107)
CO2 SERPL-SCNC: 24 MMOL/L (ref 21–31)
CREAT SERPL-MCNC: 0.83 MG/DL (ref 0.6–1.2)
ERYTHROCYTE [DISTWIDTH] IN BLOOD BY AUTOMATED COUNT: 14.1 % (ref 11.5–14.5)
GFR SERPL CREATININE-BSD FRML MDRD: 68 ML/MIN/1.73SQ M
GLUCOSE SERPL-MCNC: 184 MG/DL (ref 65–140)
GLUCOSE SERPL-MCNC: 232 MG/DL (ref 65–99)
GLUCOSE SERPL-MCNC: 233 MG/DL (ref 65–140)
GLUCOSE SERPL-MCNC: 244 MG/DL (ref 65–140)
GLUCOSE SERPL-MCNC: 287 MG/DL (ref 65–140)
GRAM STN SPEC: ABNORMAL
HCT VFR BLD AUTO: 31.9 % (ref 42–47)
HGB BLD-MCNC: 10.5 G/DL (ref 12–16)
MCH RBC QN AUTO: 32.5 PG (ref 26–34)
MCHC RBC AUTO-ENTMCNC: 32.8 G/DL (ref 31–37)
MCV RBC AUTO: 99 FL (ref 81–99)
PLATELET # BLD AUTO: 247 THOUSANDS/UL (ref 149–390)
PMV BLD AUTO: 9.4 FL (ref 8.6–11.7)
POTASSIUM SERPL-SCNC: 3.2 MMOL/L (ref 3.5–5.5)
PROCALCITONIN SERPL-MCNC: 0.44 NG/ML
PROT SERPL-MCNC: 6.3 G/DL (ref 6.4–8.9)
RBC # BLD AUTO: 3.22 MILLION/UL (ref 3.9–5.2)
SODIUM SERPL-SCNC: 146 MMOL/L (ref 134–143)
WBC # BLD AUTO: 12.3 THOUSAND/UL (ref 4.8–10.8)

## 2019-02-17 PROCEDURE — 85027 COMPLETE CBC AUTOMATED: CPT | Performed by: NURSE PRACTITIONER

## 2019-02-17 PROCEDURE — 84145 PROCALCITONIN (PCT): CPT | Performed by: NURSE PRACTITIONER

## 2019-02-17 PROCEDURE — 94760 N-INVAS EAR/PLS OXIMETRY 1: CPT

## 2019-02-17 PROCEDURE — 99232 SBSQ HOSP IP/OBS MODERATE 35: CPT | Performed by: NURSE PRACTITIONER

## 2019-02-17 PROCEDURE — 87040 BLOOD CULTURE FOR BACTERIA: CPT | Performed by: NURSE PRACTITIONER

## 2019-02-17 PROCEDURE — 80053 COMPREHEN METABOLIC PANEL: CPT | Performed by: NURSE PRACTITIONER

## 2019-02-17 PROCEDURE — 82948 REAGENT STRIP/BLOOD GLUCOSE: CPT

## 2019-02-17 PROCEDURE — 94664 DEMO&/EVAL PT USE INHALER: CPT

## 2019-02-17 RX ADMIN — PIPERACILLIN SODIUM AND TAZOBACTAM SODIUM 3.38 G: 3; .375 INJECTION, POWDER, LYOPHILIZED, FOR SOLUTION INTRAVENOUS at 15:27

## 2019-02-17 RX ADMIN — HEPARIN SODIUM 5000 UNITS: 5000 INJECTION INTRAVENOUS; SUBCUTANEOUS at 22:55

## 2019-02-17 RX ADMIN — FAMOTIDINE 20 MG: 10 INJECTION, SOLUTION INTRAVENOUS at 22:55

## 2019-02-17 RX ADMIN — INSULIN LISPRO 1 UNITS: 100 INJECTION, SOLUTION INTRAVENOUS; SUBCUTANEOUS at 22:55

## 2019-02-17 RX ADMIN — HEPARIN SODIUM 5000 UNITS: 5000 INJECTION INTRAVENOUS; SUBCUTANEOUS at 14:42

## 2019-02-17 RX ADMIN — METOPROLOL TARTRATE 5 MG: 5 INJECTION, SOLUTION INTRAVENOUS at 14:42

## 2019-02-17 RX ADMIN — ANORECTAL OINTMENT: 15.7; .44; 24; 20.6 OINTMENT TOPICAL at 17:45

## 2019-02-17 RX ADMIN — HEPARIN SODIUM 5000 UNITS: 5000 INJECTION INTRAVENOUS; SUBCUTANEOUS at 06:15

## 2019-02-17 RX ADMIN — TIOTROPIUM BROMIDE 18 MCG: 18 CAPSULE ORAL; RESPIRATORY (INHALATION) at 10:04

## 2019-02-17 RX ADMIN — ANORECTAL OINTMENT: 15.7; .44; 24; 20.6 OINTMENT TOPICAL at 10:03

## 2019-02-17 RX ADMIN — HYDRALAZINE HYDROCHLORIDE 10 MG: 20 INJECTION INTRAMUSCULAR; INTRAVENOUS at 06:13

## 2019-02-17 RX ADMIN — METHYLPREDNISOLONE SODIUM SUCCINATE 40 MG: 40 INJECTION, POWDER, FOR SOLUTION INTRAMUSCULAR; INTRAVENOUS at 10:03

## 2019-02-17 RX ADMIN — HYDRALAZINE HYDROCHLORIDE 10 MG: 20 INJECTION INTRAMUSCULAR; INTRAVENOUS at 17:28

## 2019-02-17 RX ADMIN — PIPERACILLIN SODIUM AND TAZOBACTAM SODIUM 3.38 G: 3; .375 INJECTION, POWDER, LYOPHILIZED, FOR SOLUTION INTRAVENOUS at 23:00

## 2019-02-17 RX ADMIN — FAMOTIDINE 20 MG: 10 INJECTION, SOLUTION INTRAVENOUS at 10:00

## 2019-02-17 RX ADMIN — METRONIDAZOLE 500 MG: 500 INJECTION, SOLUTION INTRAVENOUS at 01:53

## 2019-02-17 RX ADMIN — METOPROLOL TARTRATE 5 MG: 5 INJECTION, SOLUTION INTRAVENOUS at 17:46

## 2019-02-17 RX ADMIN — HYDRALAZINE HYDROCHLORIDE 10 MG: 20 INJECTION INTRAMUSCULAR; INTRAVENOUS at 12:18

## 2019-02-17 RX ADMIN — PIPERACILLIN SODIUM AND TAZOBACTAM SODIUM 3.38 G: 3; .375 INJECTION, POWDER, LYOPHILIZED, FOR SOLUTION INTRAVENOUS at 10:16

## 2019-02-17 RX ADMIN — INSULIN GLARGINE 30 UNITS: 100 INJECTION, SOLUTION SUBCUTANEOUS at 22:00

## 2019-02-17 RX ADMIN — METOPROLOL TARTRATE 5 MG: 5 INJECTION, SOLUTION INTRAVENOUS at 06:13

## 2019-02-17 NOTE — ASSESSMENT & PLAN NOTE
No results found for: HGBA1C    Recent Labs     02/16/19  1824 02/16/19  1959 02/16/19  2335 02/17/19  0556   POCGLU 255* 294* 329* 233*       Blood Sugar Average: Last 72 hrs:  (P) 288   · Resume Lantus   · Will increase dose as BG has been elevated   · Continue on insulin sliding scale  · Will monitor closely as she will be on Solu-Medrol

## 2019-02-17 NOTE — QUICK NOTE
I had a lengthy discussion with patient's daughter- Medina Viveros and son-Richy  Updates in patient status given in details  They would like to wait for speech re-evaluation in AM and would like patient to get a barium swallowing eval if able  At this time, they would like to wait to see how patient does with speech  If continues to be a high-risk for aspiration, they would like to do keofeed/dobhoff trial  They do not want a pegtube placement at this time  If indicated a PICC may be placed for IV antibiotic  The son brought up an option of TPN  I explained the reason why TPN is not indicated for this patient in details with both son and daughter- increased risk of infection and as patient is having intact GI function TPN should not be started  Additionally, they would like to change patient's code status from Full Code to DO NOT INTUBATE but okay for CPR, defibrillation, ACLS medications

## 2019-02-17 NOTE — ASSESSMENT & PLAN NOTE
· Initially aspiration pneumonia was suspected with history of CVA and dysphagia    · Repeated imaging 2/15 shows atelectasis   · Pneumonia is unlikely as patient does not have cough or sputum production   · Treatment stated above  · Will keep NPO  · Speech therapy is recommending PEG tube placement versus hospice in light of significant aspiration  · All of these were conveyed to the patient's daughter Theo Myers cell phone number [phone], home phone number [phone]  · She would like to think about her options, and will be coming up tomorrow to see her mother at which time she will make a decision regarding PEG tube versus hospice  · Otherwise continue with above-mentioned medical treatment

## 2019-02-17 NOTE — ASSESSMENT & PLAN NOTE
· Patient meets criteria for sepsis with tachycardia, tachypnea, leukocytosis   · Initially suspected to be secondary to aspiration pneumonia; however, has no coughing or sputum production  Source of bacteremia is likely from urine  · Pending formal ID evaluation  I spoke with ID on-call  · Recommended to discontinue flagyl and cefepime and start on pip-tazo as pip-tazo shows susceptibility on all strain  · Will repeat blood culture   · Patient noted to have leukocytosis from 9->12s and hypothermia today  · Follow-up procalcitonin  · Continue with IV fluids  · I am waiting to discuss treatment plan and goal of care with daughter who will be coming in later on today  · If family decides against palliative/hospice care, patient will need to get a PICC and peg-tube

## 2019-02-17 NOTE — PLAN OF CARE
Problem: Prexisting or High Potential for Compromised Skin Integrity  Goal: Skin integrity is maintained or improved  Description  INTERVENTIONS:  - Identify patients at risk for skin breakdown  - Assess and monitor skin integrity  - Assess and monitor nutrition and hydration status  - Monitor labs (i e  albumin)  - Assess for incontinence   - Turn and reposition patient  - Assist with mobility/ambulation  - Relieve pressure over bony prominences  - Avoid friction and shearing  - Provide appropriate hygiene as needed including keeping skin clean and dry  - Evaluate need for skin moisturizer/barrier cream  - Collaborate with interdisciplinary team (i e  Nutrition, Rehabilitation, etc )   - Patient/family teaching  Outcome: Progressing     Problem: Nutrition/Hydration-ADULT  Goal: Nutrient/Hydration intake appropriate for improving, restoring or maintaining nutritional needs  Description  Monitor and assess patient's nutrition/hydration status for malnutrition (ex- brittle hair, bruises, dry skin, pale skin and conjunctiva, muscle wasting, smooth red tongue, and disorientation)  Collaborate with interdisciplinary team and initiate plan and interventions as ordered  Monitor patient's weight and dietary intake as ordered or per policy  Utilize nutrition screening tool and intervene per policy  Determine patient's food preferences and provide high-protein, high-caloric foods as appropriate       INTERVENTIONS:  - Monitor oral intake, urinary output, labs, and treatment plans  - Assess nutrition and hydration status and recommend course of action  - Evaluate amount of meals eaten  - Assist patient with eating if necessary   - Allow adequate time for meals  - Recommend/ encourage appropriate diets, oral nutritional supplements, and vitamin/mineral supplements  - Order, calculate, and assess calorie counts as needed  - Recommend, monitor, and adjust tube feedings and TPN/PPN based on assessed needs  - Assess need for intravenous fluids  - Provide specific nutrition/hydration education as appropriate  - Include patient/family/caregiver in decisions related to nutrition  Outcome: Progressing     Problem: Potential for Falls  Goal: Patient will remain free of falls  Description  INTERVENTIONS:  - Assess patient frequently for physical needs  -  Identify cognitive and physical deficits and behaviors that affect risk of falls    -  [place] fall precautions as indicated by assessment   - Educate patient/family on patient safety including physical limitations  - Instruct patient to call for assistance with activity based on assessment  - Modify environment to reduce risk of injury  - Consider OT/PT consult to assist with strengthening/mobility  Outcome: Progressing

## 2019-02-17 NOTE — ASSESSMENT & PLAN NOTE
· Suspected to be secondary to acute infection  · The patient uses 2 L oxygen chronically at nursing home but has been able to stay on RA with pulse ox in mid to high 90s     · Improving  · Respiratory protocol  · Continue treatment stated above

## 2019-02-17 NOTE — SOCIAL WORK
Pt not yet medically stable for discharge  Family to decide if they want to proceed with further medical treatment with PICC and PEG tube or if they are in agreement with hospice  Pt is a bed hold at [facility] and will return to [facility] upon discharge  CM will continue to follow

## 2019-02-17 NOTE — ASSESSMENT & PLAN NOTE
· Present on admission  · Wound care input appreciated   · Wound culture shows E coli and beta hemolytic streptococcus group B  · Susceptible to pip-tazo  · Pending ID evaluation   · Continue with local care with frequent turn and reposition

## 2019-02-17 NOTE — PROGRESS NOTES
Progress Note Frank Bejarano 1940, 66 y o  female MRN: [id number]    Unit/Bed#: -02 Encounter: [id number]    Primary Care Provider: Huong Ponce DO   Date and time admitted to hospital: 2/14/2019  7:25 AM        * Bacteremia due to Escherichia coli  Assessment & Plan  · Patient meets criteria for sepsis with tachycardia, tachypnea, leukocytosis   · Initially suspected to be secondary to aspiration pneumonia; however, has no coughing or sputum production  Source of bacteremia is likely from urine  · Pending formal ID evaluation  I spoke with ID on-call  · Recommended to discontinue flagyl and cefepime and start on pip-tazo as pip-tazo shows susceptibility on all strain  · Will repeat blood culture   · Patient noted to have leukocytosis from 9->12s and hypothermia today  · Follow-up procalcitonin  · Continue with IV fluids  · I am waiting to discuss treatment plan and goal of care with daughter who will be coming in later on today  · If family decides against palliative/hospice care, patient will need to get a PICC and peg-tube  Pneumonia due to infectious organism  Assessment & Plan  · Initially aspiration pneumonia was suspected with history of CVA and dysphagia    · Repeated imaging 2/15 shows atelectasis   · Pneumonia is unlikely as patient does not have cough or sputum production   · Treatment stated above  · Will keep NPO  · Speech therapy is recommending PEG tube placement versus hospice in light of significant aspiration  · All of these were conveyed to the patient's daughter Isabela Wiley cell phone number [phone], home phone number [phone]  · She would like to think about her options, and will be coming up tomorrow to see her mother at which time she will make a decision regarding PEG tube versus hospice  · Otherwise continue with above-mentioned medical treatment    Acute on chronic respiratory failure with hypoxia ([facility] [address] )  Assessment & Plan  · Suspected to be secondary to acute infection  · The patient uses 2 L oxygen chronically at nursing home but has been able to stay on RA with pulse ox in mid to high 90s  · Improving  · Respiratory protocol  · Continue treatment stated above    COPD with acute exacerbation ([address] )  Assessment & Plan  · This is resolved   · Titrate down on steroids  · Continue treatment stated above  Other dysphagia  Assessment & Plan  · Appreciate speech input  · Keep NPO  · Aspiration precautions  · Decision regarding PEG tube versus hospice pending daughters decision as noted above      Pressure ulcer of left buttock, stage 2  Assessment & Plan  · Present on admission  · Wound care input appreciated   · Wound culture shows E coli and beta hemolytic streptococcus group B  · Susceptible to pip-tazo  · Pending ID evaluation   · Continue with local care with frequent turn and reposition    Type 2 diabetes mellitus with complication, without long-term current use of insulin [facility])  Assessment & Plan  No results found for: HGBA1C    Recent Labs     02/16/19  1824 02/16/19  1959 02/16/19  2335 02/17/19  0556   POCGLU 255* 294* 329* 233*       Blood Sugar Average: Last 72 hrs:  (P) 288   · Resume Lantus   · Will increase dose as BG has been elevated   · Continue on insulin sliding scale  · Will monitor closely as she will be on Solu-Medrol  Hypernatremia  Assessment & Plan  · This is related to poor oral intake in free water deficit  · IV fluids  · Improving  · Nephrology is following   · Will follow up with labs          VTE Pharmacologic Prophylaxis: Pharmacologic: Heparin    Patient Centered Rounds: I have performed bedside rounds with nursing staff today  Discussions with Specialists or Other Care Team Provider: nursing, CM, PT/OT   Education and Discussions with Family / Patient: daughter will be here today to discuss goal of care  Time Spent for Care: 30 minutes    More than 50% of total time spent on counseling and coordination of care as described above  Current Length of Stay: 3 day(s)    Current Patient Status: Inpatient   Certification Statement: The patient will continue to require additional inpatient hospital stay due to [facility] bacteremia     Discharge Plan: pending hospital course  Code Status: Level 1 - Full Code    Subjective:   Patient denies any pain except her buttocks  Per staff, BP slightly improves with hydralazine  Elevated this AM however but there is no recheck BP as of now  Objective:     Vitals:   Temp (24hrs), Av 4 °F (36 3 °C), Min:96 7 °F (35 9 °C), Max:98 3 °F (36 8 °C)    Temp:  [96 7 °F (35 9 °C)-98 3 °F (36 8 °C)] 96 7 °F (35 9 °C)  HR:  [72-84] 73  Resp:  [16] 16  BP: (156-185)/(53-92) 185/65  SpO2:  [95 %-97 %] 95 %  Body mass index is 27 22 kg/m²  Input and Output Summary (last 24 hours): Intake/Output Summary (Last 24 hours) at 2019 1135  Last data filed at 2019 0549  Gross per 24 hour   Intake 1000 ml   Output 2400 ml   Net -1400 ml       Physical Exam:     Physical Exam   Constitutional: She appears well-developed  Chronically ill appearing  Frail elderly female  HENT:   Head: Normocephalic and atraumatic  Mouth/Throat: Oropharynx is clear and moist    Eyes: Pupils are equal, round, and reactive to light  Conjunctivae and EOM are normal    Neck: Normal range of motion  Neck supple  Cardiovascular: Normal rate, regular rhythm and normal heart sounds  Exam reveals no gallop and no friction rub  No murmur heard  Pulmonary/Chest: Effort normal and breath sounds normal  She has no wheezes  She has no rales  Slightly decreased in bases     Abdominal: Soft  Bowel sounds are normal  She exhibits no distension  There is no tenderness  There is no rebound  Musculoskeletal: Normal range of motion  She exhibits no edema, tenderness or deformity  Neurological: She is alert  No cranial nerve deficit  Able to follow simple commands   Upper extremities strength +4/5 bilaterally  Bilateral lower extremities strength +2/5  Skin: Skin is warm and dry  No rash noted  No erythema  Psychiatric: She has a normal mood and affect  Vitals reviewed  Additional Data:     Labs:    Results from last 7 days   Lab Units 02/17/19  0434  02/15/19  0503 02/14/19  0815   WBC Thousand/uL 12 30*   < > 7 90 12 30*   HEMOGLOBIN g/dL 10 5*   < > 10 3* 11 0*   HEMATOCRIT % 31 9*   < > 31 8* 34 5*   PLATELETS Thousands/uL 247   < > 176 209   NEUTROS PCT %  --   --   --  81*   LYMPHS PCT %  --   --   --  9*   LYMPHO PCT %  --   --  9*  --    MONOS PCT %  --   --   --  9   MONO PCT %  --   --  2*  --    EOS PCT %  --   --   --  1    < > = values in this interval not displayed  Results from last 7 days   Lab Units 02/17/19  0434   POTASSIUM mmol/L 3 2*   CHLORIDE mmol/L 113*   CO2 mmol/L 24   BUN mg/dL 33*   CREATININE mg/dL 0 83   CALCIUM mg/dL 8 8   ALK PHOS U/L 37*   ALT U/L 9   AST U/L 8*     Results from last 7 days   Lab Units 02/14/19  0815   INR  1 22     Results from last 7 days   Lab Units 02/17/19  0556 02/16/19  2335 02/16/19  1959 02/16/19  1824 02/16/19  1209 02/16/19  0604 02/16/19  0035 02/15/19  2157 02/15/19  1838 02/15/19  1555 02/15/19  1123 02/15/19  0603   POC GLUCOSE mg/dl 233* 329* 294* 255* 341* 335* 278* 307* 293* 304* 315* 237*           * I Have Reviewed All Lab Data Listed Above  * Additional Pertinent Lab Tests Reviewed:  [address] Admission  Reviewed    Imaging:  Imaging Reports Reviewed Today Include: n/a     Recent Cultures (last 7 days):     Results from last 7 days   Lab Units 02/14/19  1404 02/14/19  1323 02/14/19  1246 02/14/19  0835 02/14/19  0815   BLOOD CULTURE   --   --   --  Non lactose fermenting gram negative tony* Escherichia coli*   GRAM STAIN RESULT  No polys seen*  Rare Gram negative rods*  --   --  Gram negative rods* Gram negative rods*   URINE CULTURE   --  >100,000 cfu/ml Escherichia coli*  >100,000 cfu/ml Escherichia coli*  --   --   --    WOUND CULTURE  Few Colonies of - STRAIN #1 Escherichia coli*  Few Colonies of - STRAIN #2 Escherichia coli*  1+ Growth of Beta Hemolytic Streptococcus Group B*  --   --   --   --    INFLUENZA B PCR   --   --   --   --  Not Detected   RSV PCR   --   --   --   --  Not Detected   LEGIONELLA URINARY ANTIGEN   --   --  Negative  --   --        Last 24 Hours Medication List:     Current Facility-Administered Medications:  acetaminophen 650 mg Rectal Q4H PRN Bri Laguna PA-C    acetaminophen 650 mg Oral Q6H PRN Brian Alvarez MD    albuterol 2 5 mg Nebulization Q4H PRN Brian Alvarez MD    amLODIPine 10 mg Oral Daily Brian Alvarez MD    ammonium lactate  Topical BID PRN Brian Alvarez MD    aspirin 81 mg Oral Daily Brian Alvarez MD    atorvastatin 10 mg Oral Daily Brian Alvarez MD    bisacodyl 10 mg Oral Daily PRN Brian Alvarez MD    dextrose 5 % and sodium chloride 0 45 % 75 mL/hr Intravenous Continuous Brian Alvarez MD Last Rate: 75 mL/hr (02/16/19 2050)   famotidine 20 mg Intravenous Q12H [facility] ALY Lee    ferrous sulfate 325 mg Oral TID With Meals Brian Alvarez MD    gabapentin 100 mg Oral BID Brian Alvarez MD    heparin (porcine) 5,000 Units Subcutaneous [facility] Brian Alvarez MD    hydrALAZINE 10 mg Intravenous Q6H [facility] ALY Lee    HYDROcodone-acetaminophen 1 tablet Oral Q6H PRN Brian Alvarez MD    insulin glargine 30 Units Subcutaneous HS ALY Lee    insulin lispro 1-5 Units Subcutaneous HS Brian Alvarez MD    insulin lispro 1-6 Units Subcutaneous Q6H Brian Alvarez MD    isosorbide dinitrate 30 mg Oral BID after meals Brian Alvarez MD    LORazepam 0 5 mg Oral Q8H PRN Brian Alvarez MD    megestrol 200 mg Oral Daily Brian Alvarez MD    menthol-zinc oxide  Topical BID Brian Alvarez MD    methylPREDNISolone sodium succinate 40 mg Intravenous Daily ALY Lee    metoprolol 5 mg Intravenous Q6H PRN Brian Alvarez MD    metoprolol 5 mg Intravenous Q6H Aicha Duvall, ALY    piperacillin-tazobactam 3 375 g Intravenous Q6H ALY Nowak Last Rate: 3 375 g (02/17/19 1016)   polyethylene glycol 17 g Oral Daily Marciano Gao MD    senna 1 tablet Oral Daily Marciano Gao MD    sertraline 50 mg Oral Daily Marciano Gao MD    tiotropium 18 mcg Inhalation Daily Marciano Gao MD         Today, Patient Was Seen By: ALY Nowak    ** Please Note: Dictation voice to text software may have been used in the creation of this document   **

## 2019-02-18 LAB
ALBUMIN SERPL BCP-MCNC: 3 G/DL (ref 3.5–5.7)
ALP SERPL-CCNC: 36 U/L (ref 55–165)
ALT SERPL W P-5'-P-CCNC: 12 U/L (ref 7–52)
ANION GAP SERPL CALCULATED.3IONS-SCNC: 10 MMOL/L (ref 4–13)
AST SERPL W P-5'-P-CCNC: 13 U/L (ref 13–39)
BILIRUB SERPL-MCNC: 0.5 MG/DL (ref 0.2–1)
BUN SERPL-MCNC: 30 MG/DL (ref 7–25)
CALCIUM SERPL-MCNC: 8.7 MG/DL (ref 8.6–10.5)
CHLORIDE SERPL-SCNC: 116 MMOL/L (ref 98–107)
CO2 SERPL-SCNC: 23 MMOL/L (ref 21–31)
CREAT SERPL-MCNC: 0.95 MG/DL (ref 0.6–1.2)
ERYTHROCYTE [DISTWIDTH] IN BLOOD BY AUTOMATED COUNT: 14.1 % (ref 11.5–14.5)
GFR SERPL CREATININE-BSD FRML MDRD: 58 ML/MIN/1.73SQ M
GLUCOSE SERPL-MCNC: 164 MG/DL (ref 65–99)
GLUCOSE SERPL-MCNC: 169 MG/DL (ref 65–140)
GLUCOSE SERPL-MCNC: 181 MG/DL (ref 65–140)
GLUCOSE SERPL-MCNC: 321 MG/DL (ref 65–140)
GLUCOSE SERPL-MCNC: 386 MG/DL (ref 65–140)
GLUCOSE SERPL-MCNC: 441 MG/DL (ref 65–140)
GLUCOSE SERPL-MCNC: 500 MG/DL (ref 65–140)
HCT VFR BLD AUTO: 33.8 % (ref 42–47)
HGB BLD-MCNC: 10.9 G/DL (ref 12–16)
MAGNESIUM SERPL-MCNC: 1.9 MG/DL (ref 1.9–2.7)
MCH RBC QN AUTO: 32.6 PG (ref 26–34)
MCHC RBC AUTO-ENTMCNC: 32.4 G/DL (ref 31–37)
MCV RBC AUTO: 101 FL (ref 81–99)
PLATELET # BLD AUTO: 247 THOUSANDS/UL (ref 149–390)
PMV BLD AUTO: 9.7 FL (ref 8.6–11.7)
POTASSIUM SERPL-SCNC: 3 MMOL/L (ref 3.5–5.5)
PROCALCITONIN SERPL-MCNC: 0.34 NG/ML
PROT SERPL-MCNC: 6.1 G/DL (ref 6.4–8.9)
RBC # BLD AUTO: 3.35 MILLION/UL (ref 3.9–5.2)
SODIUM SERPL-SCNC: 149 MMOL/L (ref 134–143)
WBC # BLD AUTO: 12.2 THOUSAND/UL (ref 4.8–10.8)

## 2019-02-18 PROCEDURE — 82948 REAGENT STRIP/BLOOD GLUCOSE: CPT

## 2019-02-18 PROCEDURE — 84145 PROCALCITONIN (PCT): CPT | Performed by: NURSE PRACTITIONER

## 2019-02-18 PROCEDURE — G0427 INPT/ED TELECONSULT70: HCPCS | Performed by: INTERNAL MEDICINE

## 2019-02-18 PROCEDURE — 80053 COMPREHEN METABOLIC PANEL: CPT | Performed by: NURSE PRACTITIONER

## 2019-02-18 PROCEDURE — 99232 SBSQ HOSP IP/OBS MODERATE 35: CPT | Performed by: INTERNAL MEDICINE

## 2019-02-18 PROCEDURE — 92526 ORAL FUNCTION THERAPY: CPT

## 2019-02-18 PROCEDURE — 87040 BLOOD CULTURE FOR BACTERIA: CPT | Performed by: NURSE PRACTITIONER

## 2019-02-18 PROCEDURE — 94760 N-INVAS EAR/PLS OXIMETRY 1: CPT

## 2019-02-18 PROCEDURE — 85027 COMPLETE CBC AUTOMATED: CPT | Performed by: NURSE PRACTITIONER

## 2019-02-18 PROCEDURE — 83735 ASSAY OF MAGNESIUM: CPT | Performed by: INTERNAL MEDICINE

## 2019-02-18 RX ORDER — PREDNISONE 20 MG/1
20 TABLET ORAL DAILY
Status: DISCONTINUED | OUTPATIENT
Start: 2019-02-19 | End: 2019-02-19

## 2019-02-18 RX ORDER — POTASSIUM CHLORIDE 14.9 MG/ML
20 INJECTION INTRAVENOUS
Status: COMPLETED | OUTPATIENT
Start: 2019-02-18 | End: 2019-02-18

## 2019-02-18 RX ORDER — DEXTROSE AND POTASSIUM CHLORIDE 5; .15 G/100ML; G/100ML
100 SOLUTION INTRAVENOUS CONTINUOUS
Status: DISCONTINUED | OUTPATIENT
Start: 2019-02-18 | End: 2019-02-21 | Stop reason: HOSPADM

## 2019-02-18 RX ORDER — POTASSIUM CHLORIDE 20 MEQ/1
20 TABLET, EXTENDED RELEASE ORAL
Status: DISCONTINUED | OUTPATIENT
Start: 2019-02-18 | End: 2019-02-18

## 2019-02-18 RX ORDER — CEFEPIME HYDROCHLORIDE 2 G/50ML
2000 INJECTION, SOLUTION INTRAVENOUS EVERY 12 HOURS
Status: DISCONTINUED | OUTPATIENT
Start: 2019-02-18 | End: 2019-02-21

## 2019-02-18 RX ADMIN — HYDRALAZINE HYDROCHLORIDE 10 MG: 20 INJECTION INTRAMUSCULAR; INTRAVENOUS at 12:42

## 2019-02-18 RX ADMIN — METOPROLOL TARTRATE 5 MG: 5 INJECTION, SOLUTION INTRAVENOUS at 12:42

## 2019-02-18 RX ADMIN — GABAPENTIN 100 MG: 100 CAPSULE ORAL at 09:43

## 2019-02-18 RX ADMIN — FERROUS SULFATE TAB 325 MG (65 MG ELEMENTAL FE) 325 MG: 325 (65 FE) TAB at 12:40

## 2019-02-18 RX ADMIN — INSULIN GLARGINE 30 UNITS: 100 INJECTION, SOLUTION SUBCUTANEOUS at 22:54

## 2019-02-18 RX ADMIN — POTASSIUM CHLORIDE 20 MEQ: 200 INJECTION, SOLUTION INTRAVENOUS at 09:34

## 2019-02-18 RX ADMIN — FERROUS SULFATE TAB 325 MG (65 MG ELEMENTAL FE) 325 MG: 325 (65 FE) TAB at 17:50

## 2019-02-18 RX ADMIN — FAMOTIDINE 20 MG: 10 INJECTION, SOLUTION INTRAVENOUS at 20:47

## 2019-02-18 RX ADMIN — HYDRALAZINE HYDROCHLORIDE 10 MG: 20 INJECTION INTRAMUSCULAR; INTRAVENOUS at 06:29

## 2019-02-18 RX ADMIN — DEXTROSE AND SODIUM CHLORIDE 75 ML/HR: 5; 450 INJECTION, SOLUTION INTRAVENOUS at 03:44

## 2019-02-18 RX ADMIN — INSULIN HUMAN 5 UNITS: 100 INJECTION, SOLUTION PARENTERAL at 20:51

## 2019-02-18 RX ADMIN — POTASSIUM CHLORIDE AND DEXTROSE MONOHYDRATE 100 ML/HR: 150; 5 INJECTION, SOLUTION INTRAVENOUS at 09:33

## 2019-02-18 RX ADMIN — PIPERACILLIN SODIUM AND TAZOBACTAM SODIUM 3.38 G: 3; .375 INJECTION, POWDER, LYOPHILIZED, FOR SOLUTION INTRAVENOUS at 12:36

## 2019-02-18 RX ADMIN — ISOSORBIDE DINITRATE 30 MG: 10 TABLET ORAL at 17:50

## 2019-02-18 RX ADMIN — HYDRALAZINE HYDROCHLORIDE 10 MG: 20 INJECTION INTRAMUSCULAR; INTRAVENOUS at 01:33

## 2019-02-18 RX ADMIN — POTASSIUM CHLORIDE 20 MEQ: 200 INJECTION, SOLUTION INTRAVENOUS at 14:00

## 2019-02-18 RX ADMIN — PIPERACILLIN SODIUM AND TAZOBACTAM SODIUM 3.38 G: 3; .375 INJECTION, POWDER, LYOPHILIZED, FOR SOLUTION INTRAVENOUS at 03:45

## 2019-02-18 RX ADMIN — HEPARIN SODIUM 5000 UNITS: 5000 INJECTION INTRAVENOUS; SUBCUTANEOUS at 14:58

## 2019-02-18 RX ADMIN — METOPROLOL TARTRATE 5 MG: 5 INJECTION, SOLUTION INTRAVENOUS at 01:45

## 2019-02-18 RX ADMIN — HEPARIN SODIUM 5000 UNITS: 5000 INJECTION INTRAVENOUS; SUBCUTANEOUS at 06:29

## 2019-02-18 RX ADMIN — FERROUS SULFATE TAB 325 MG (65 MG ELEMENTAL FE) 325 MG: 325 (65 FE) TAB at 09:43

## 2019-02-18 RX ADMIN — SERTRALINE HYDROCHLORIDE 50 MG: 25 TABLET ORAL at 09:42

## 2019-02-18 RX ADMIN — FAMOTIDINE 20 MG: 10 INJECTION, SOLUTION INTRAVENOUS at 12:37

## 2019-02-18 RX ADMIN — ISOSORBIDE DINITRATE 30 MG: 10 TABLET ORAL at 09:42

## 2019-02-18 RX ADMIN — METOPROLOL TARTRATE 5 MG: 5 INJECTION, SOLUTION INTRAVENOUS at 06:49

## 2019-02-18 RX ADMIN — AMLODIPINE BESYLATE 10 MG: 5 TABLET ORAL at 09:43

## 2019-02-18 RX ADMIN — ANORECTAL OINTMENT: 15.7; .44; 24; 20.6 OINTMENT TOPICAL at 17:55

## 2019-02-18 RX ADMIN — HYDRALAZINE HYDROCHLORIDE 10 MG: 20 INJECTION INTRAMUSCULAR; INTRAVENOUS at 17:58

## 2019-02-18 RX ADMIN — ASPIRIN 81 MG: 81 TABLET, COATED ORAL at 09:42

## 2019-02-18 RX ADMIN — ANORECTAL OINTMENT: 15.7; .44; 24; 20.6 OINTMENT TOPICAL at 09:33

## 2019-02-18 RX ADMIN — ATORVASTATIN CALCIUM 10 MG: 10 TABLET, FILM COATED ORAL at 09:43

## 2019-02-18 RX ADMIN — METOPROLOL TARTRATE 5 MG: 5 INJECTION, SOLUTION INTRAVENOUS at 17:57

## 2019-02-18 RX ADMIN — HEPARIN SODIUM 5000 UNITS: 5000 INJECTION INTRAVENOUS; SUBCUTANEOUS at 22:54

## 2019-02-18 RX ADMIN — GABAPENTIN 100 MG: 100 CAPSULE ORAL at 17:50

## 2019-02-18 RX ADMIN — CEFEPIME HYDROCHLORIDE 2000 MG: 2 INJECTION, SOLUTION INTRAVENOUS at 17:55

## 2019-02-18 RX ADMIN — TIOTROPIUM BROMIDE 18 MCG: 18 CAPSULE ORAL; RESPIRATORY (INHALATION) at 09:28

## 2019-02-18 NOTE — ASSESSMENT & PLAN NOTE
· Patient meets criteria for sepsis with tachycardia, tachypnea, leukocytosis   · Initially suspected to be secondary to aspiration pneumonia; however, has no coughing or sputum production  Source of bacteremia is likely from urine     · Awaiting Infectious Disease consultation  · Continue Zosyn for now

## 2019-02-18 NOTE — ASSESSMENT & PLAN NOTE
· Initially aspiration pneumonia was suspected with history of CVA and dysphagia    · Repeated imaging 2/15 shows atelectasis   · Pneumonia is unlikely as patient does not have cough or sputum production   · Patient was seen by speech therapy today, and was cleared to resume her home diet of level 1 puree, honey thick liquids  · Await ID input

## 2019-02-18 NOTE — PLAN OF CARE
Resume level 1 diet and honey thick liquids  Aspiration precautions  Bhupendra Chan MA, CCC/SLP  [id number]  [email or web address]  Available via tiger text

## 2019-02-18 NOTE — PROGRESS NOTES
Progress Note - Nephrology   Saud Bejarano 66 y o  female MRN: [id number]  Unit/Bed#: -02 Encounter: [id number]    A/P:  1  Acute kidney injury-resolved  2  Hypernatremia   Patient dehydrated due to inability to have oral free fluids  Will change her IVs from half-normal saline over to D5W with 20 mEq per L of potassium chloride  3  Hypokalemia   Will give the patient 40 mEq of potassium chloride IV over the next 4 hr, as mentioned above also include 20 mEq per L of potassium chloride with D5W for additional 44 mEq over the next 24 hr   Will check the patient's magnesium level, supplement as indicated  4  Bacteremia due to E coli  5  Dysphagia with possible PEG placement  6  Stage II pressure ulcer left buttock     Follow up reason for today's visit:     Bacteremia due to Escherichia coli    Patient Active Problem List   Diagnosis    Bacteremia due to Escherichia coli    Acute on chronic respiratory failure with hypoxia (HCC)    Pneumonia due to infectious organism    Bronchospasm    Hypernatremia    Renal insufficiency    COPD with acute exacerbation ([facility] [address] )    Type 2 diabetes mellitus with complication, without long-term current use of insulin ([facility])    Pressure ulcer of left buttock, stage 2    Other dysphagia         Subjective:   No acute events overnight    Objective:     Vitals: Blood pressure 167/62, pulse 76, temperature (!) 96 8 °F (36 °C), temperature source Tympanic, resp  rate 18, height 5' 8" (1 727 m), weight 81 2 kg (179 lb), SpO2 97 %, not currently breastfeeding  ,Body mass index is 27 22 kg/m²  Weight (last 2 days)     None            Intake/Output Summary (Last 24 hours) at 2/18/2019 0828  Last data filed at 2/18/2019 0504  Gross per 24 hour   Intake 0 ml   Output 675 ml   Net -675 ml     I/O last 3 completed shifts: In: 1000 [I V :1000]  Out: 1825 [Urine:1825]    Urethral Catheter Non-latex 16 Fr   (Active)   Amt returned on insertion(mL) 200 mL 2/14/2019 12:15 PM   Site Assessment Clean;Skin intact 2/15/2019  7:50 PM   Collection Container Standard drainage bag 2/15/2019  7:50 PM   Securement Method Securing device (Describe) 2/15/2019  7:50 PM   Output (mL) 650 mL 2/17/2019  5:49 AM       Physical Exam: /62 (BP Location: Right arm)   Pulse 76   Temp (!) 96 8 °F (36 °C) (Tympanic)   Resp 18   Ht 5' 8" (1 727 m)   Wt 81 2 kg (179 lb)   LMP  (LMP Unknown)   SpO2 97%   Breastfeeding? No   BMI 27 22 kg/m²     General Appearance:    Patient not responding to questions   Head:    Normocephalic, without obvious abnormality, atraumatic   Eyes:    Conjunctiva/corneas clear   Ears:    Normal external ears   Nose:   Nares normal, septum midline, mucosa normal, no drainage    or sinus tenderness   Throat:   Lips, mucosa, and tongue normal; teeth and gums normal   Neck:   Supple   Back:     Symmetric, no curvature, ROM normal, no CVA tenderness   Lungs:     Clear to auscultation bilaterally, respirations unlabored   Chest wall:    No tenderness or deformity   Heart:    Regular rate and rhythm, S1 and S2 normal, no murmur, rub   or gallop   Abdomen:     Soft, non-tender, bowel sounds active   Extremities:   Extremities normal, atraumatic, no cyanosis or edema   Skin:   Skin color, texture, turgor normal, no rashes or lesions   Lymph nodes:   Cervical normal   Neurologic:   CNII-XII intact            Lab, Imaging and other studies: I have personally reviewed pertinent labs    CBC:   Lab Results   Component Value Date    WBC 12 20 (H) 02/18/2019    HGB 10 9 (L) 02/18/2019    HCT 33 8 (L) 02/18/2019     (H) 02/18/2019     02/18/2019    MCH 32 6 02/18/2019    MCHC 32 4 02/18/2019    RDW 14 1 02/18/2019    MPV 9 7 02/18/2019     CMP:   Lab Results   Component Value Date    K 3 0 (L) 02/18/2019     (H) 02/18/2019    CO2 23 02/18/2019    BUN 30 (H) 02/18/2019    CREATININE 0 95 02/18/2019    CALCIUM 8 7 02/18/2019    AST 13 02/18/2019    ALT 12 02/18/2019    ALKPHOS 36 (L) 02/18/2019    EGFR 58 02/18/2019         Results from last 7 days   Lab Units 02/18/19  0459 02/17/19  0434 02/16/19  0514   POTASSIUM mmol/L 3 0* 3 2* 3 9   CHLORIDE mmol/L 116* 113* 112*   CO2 mmol/L 23 24 24   BUN mg/dL 30* 33* 36*   CREATININE mg/dL 0 95 0 83 0 89   CALCIUM mg/dL 8 7 8 8 9 0   ALK PHOS U/L 36* 37* 41*   ALT U/L 12 9 8   AST U/L 13 8* 8*         Phosphorus: No results found for: PHOS  Magnesium: No results found for: MG  Urinalysis: No results found for: COLORU, CLARITYU, SPECGRAV, PHUR, LEUKOCYTESUR, NITRITE, PROTEINUA, GLUCOSEU, KETONESU, BILIRUBINUR, BLOODU  Ionized Calcium: No results found for: CAION  Coagulation: No results found for: PT, INR, APTT  Troponin: No results found for: TROPONINI  ABG: No results found for: PHART, ZEE7FCW, PO2ART, PQC6OEP, V3XYQOUM, BEART, SOURCE  Radiology review:     IMAGING  No results found      Current Facility-Administered Medications   Medication Dose Route Frequency    acetaminophen (TYLENOL) rectal suppository 650 mg  650 mg Rectal Q4H PRN    acetaminophen (TYLENOL) tablet 650 mg  650 mg Oral Q6H PRN    albuterol inhalation solution 2 5 mg  2 5 mg Nebulization Q4H PRN    amLODIPine (NORVASC) tablet 10 mg  10 mg Oral Daily    ammonium lactate (LAC-HYDRIN) 12 % lotion   Topical BID PRN    aspirin (ECOTRIN LOW STRENGTH) EC tablet 81 mg  81 mg Oral Daily    atorvastatin (LIPITOR) tablet 10 mg  10 mg Oral Daily    bisacodyl (DULCOLAX) EC tablet 10 mg  10 mg Oral Daily PRN    dextrose 5 % with KCl 20 mEq/L infusion (premix)  100 mL/hr Intravenous Continuous    famotidine (PEPCID) injection 20 mg  20 mg Intravenous Q12H ELIO    ferrous sulfate tablet 325 mg  325 mg Oral TID With Meals    gabapentin (NEURONTIN) capsule 100 mg  100 mg Oral BID    heparin (porcine) subcutaneous injection 5,000 Units  5,000 Units Subcutaneous Q8H [address]    hydrALAZINE (APRESOLINE) injection 10 mg  10 mg Intravenous Q6H [address]    HYDROcodone-acetaminophen (NORCO) 9-903 mg per tablet 1 tablet  1 tablet Oral Q6H PRN    insulin glargine (LANTUS) subcutaneous injection 30 Units 0 3 mL  30 Units Subcutaneous HS    insulin lispro (HumaLOG) 100 units/mL subcutaneous injection 1-5 Units  1-5 Units Subcutaneous HS    insulin lispro (HumaLOG) 100 units/mL subcutaneous injection 1-6 Units  1-6 Units Subcutaneous Q6H    isosorbide dinitrate (ISORDIL) tablet 30 mg  30 mg Oral BID after meals    LORazepam (ATIVAN) tablet 0 5 mg  0 5 mg Oral Q8H PRN    megestrol (MEGACE) oral suspension 200 mg  200 mg Oral Daily    menthol-zinc oxide (CALMOSEPTINE) 0 44-20 6 % ointment   Topical BID    methylPREDNISolone sodium succinate (Solu-MEDROL) injection 40 mg  40 mg Intravenous Daily    metoprolol (LOPRESSOR) injection 5 mg  5 mg Intravenous Q6H PRN    metoprolol (LOPRESSOR) injection 5 mg  5 mg Intravenous Q6H    piperacillin-tazobactam (ZOSYN) 3 375 g in sodium chloride 0 9 % 50 mL IVPB  3 375 g Intravenous Q6H    polyethylene glycol (MIRALAX) packet 17 g  17 g Oral Daily    potassium chloride 20 mEq IVPB (premix)  20 mEq Intravenous Q2H    senna (SENOKOT) tablet 8 6 mg  1 tablet Oral Daily    sertraline (ZOLOFT) tablet 50 mg  50 mg Oral Daily    tiotropium (SPIRIVA) capsule for inhaler 18 mcg  18 mcg Inhalation Daily     Medications Discontinued During This Encounter   Medication Reason    ammonium lactate (LAC-HYDRIN) 12 % cream Formulary change    vancomycin (VANCOCIN) 1,250 mg in sodium chloride 0 9 % 250 mL IVPB Dose adjustment    albuterol inhalation solution 2 5 mg     insulin glargine (LANTUS) subcutaneous injection 46 Units 0 46 mL     insulin lispro (HumaLOG) 100 units/mL subcutaneous injection 1-6 Units     sodium chloride 0 9 % infusion     dextrose 5 % and sodium chloride 0 33 % infusion     piperacillin-tazobactam (ZOSYN) 2 25 g in sodium chloride 0 9 % 50 mL IVPB     vancomycin (VANCOCIN) 750 mg in sodium chloride 0 9 % 250 mL IVPB     methylPREDNISolone sodium succinate (Solu-MEDROL) injection 40 mg     metoprolol tartrate (LOPRESSOR) tablet 25 mg     insulin glargine (LANTUS) subcutaneous injection 10 Units 0 1 mL     acetaminophen (TYLENOL) rectal suppository 325 mg     morphine injection 1 mg     polyethylene glycol (GLYCOLAX) bowel prep 17 g Formulary change    hydrALAZINE (APRESOLINE) injection 10 mg     insulin glargine (LANTUS) subcutaneous injection 20 Units 0 2 mL     famotidine (PEPCID) tablet 20 mg     methylPREDNISolone sodium succinate (Solu-MEDROL) injection 40 mg     cefepime (MAXIPIME) IVPB (premix) 2,000 mg     metroNIDAZOLE (FLAGYL) IVPB (premix) 500 mg     morphine injection 1 mg     potassium chloride (K-DUR,KLOR-CON) CR tablet 20 mEq     dextrose 5 % and sodium chloride 0 45 % infusion        Skip De La Cruz, DO

## 2019-02-18 NOTE — PLAN OF CARE
Problem: Prexisting or High Potential for Compromised Skin Integrity  Goal: Skin integrity is maintained or improved  Description  INTERVENTIONS:  - Identify patients at risk for skin breakdown  - Assess and monitor skin integrity  - Assess and monitor nutrition and hydration status  - Monitor labs (i e  albumin)  - Assess for incontinence   - Turn and reposition patient  - Assist with mobility/ambulation  - Relieve pressure over bony prominences  - Avoid friction and shearing  - Provide appropriate hygiene as needed including keeping skin clean and dry  - Evaluate need for skin moisturizer/barrier cream  - Collaborate with interdisciplinary team (i e  Nutrition, Rehabilitation, etc )   - Patient/family teaching  Outcome: Progressing     Problem: Nutrition/Hydration-ADULT  Goal: Nutrient/Hydration intake appropriate for improving, restoring or maintaining nutritional needs  Description  Monitor and assess patient's nutrition/hydration status for malnutrition (ex- brittle hair, bruises, dry skin, pale skin and conjunctiva, muscle wasting, smooth red tongue, and disorientation)  Collaborate with interdisciplinary team and initiate plan and interventions as ordered  Monitor patient's weight and dietary intake as ordered or per policy  Utilize nutrition screening tool and intervene per policy  Determine patient's food preferences and provide high-protein, high-caloric foods as appropriate       INTERVENTIONS:  - Monitor oral intake, urinary output, labs, and treatment plans  - Assess nutrition and hydration status and recommend course of action  - Evaluate amount of meals eaten  - Assist patient with eating if necessary   - Allow adequate time for meals  - Recommend/ encourage appropriate diets, oral nutritional supplements, and vitamin/mineral supplements  - Order, calculate, and assess calorie counts as needed  - Recommend, monitor, and adjust tube feedings and TPN/PPN based on assessed needs  - Assess need for intravenous fluids  - Provide specific nutrition/hydration education as appropriate  - Include patient/family/caregiver in decisions related to nutrition  Outcome: Progressing     Problem: Potential for Falls  Goal: Patient will remain free of falls  Description  INTERVENTIONS:  - Assess patient frequently for physical needs  -  Identify cognitive and physical deficits and behaviors that affect risk of falls    -  Bonnie fall precautions as indicated by assessment   - Educate patient/family on patient safety including physical limitations  - Instruct patient to call for assistance with activity based on assessment  - Modify environment to reduce risk of injury  - Consider OT/PT consult to assist with strengthening/mobility  Outcome: Progressing

## 2019-02-18 NOTE — PROGRESS NOTES
Progress Note Finesse Bejarano 1940, 66 y o  female MRN: [id number]    Unit/Bed#: -02 Encounter: [id number]    Primary Care Provider: Sofia Mcgrath DO   Date and time admitted to hospital: 2/14/2019  7:25 AM        * Bacteremia due to Escherichia coli  Assessment & Plan  · Patient meets criteria for sepsis with tachycardia, tachypnea, leukocytosis   · Initially suspected to be secondary to aspiration pneumonia; however, has no coughing or sputum production  Source of bacteremia is likely from urine  · Awaiting Infectious Disease consultation  · Continue Zosyn for now    Pneumonia due to infectious organism  Assessment & Plan  · Initially aspiration pneumonia was suspected with history of CVA and dysphagia  · Repeated imaging 2/15 shows atelectasis   · Pneumonia is unlikely as patient does not have cough or sputum production   · Patient was seen by speech therapy today, and was cleared to resume her home diet of level 1 puree, honey thick liquids  · Await ID input    Other dysphagia  Assessment & Plan  · Appreciate speech input  · Resume pureed diet with honey thick liquids    Pressure ulcer of left buttock, stage 2  Assessment & Plan  · Present on admission  · Wound care input appreciated   · Wound culture shows E coli and beta hemolytic streptococcus group B  · Susceptible to pip-tazo  · Pending ID evaluation   · Continue with local care with frequent turn and reposition    Type 2 diabetes mellitus with complication, without long-term current use of insulin [facility])  Assessment & Plan  No results found for: HGBA1C    Recent Labs     02/17/19  1546 02/17/19  2023 02/18/19  0132 02/18/19  0613   POCGLU 244* 184* 169* 181*       Blood Sugar Average: Last 72 hrs:  (P) 269 [id number]   · Resume Lantus   · Continue on insulin sliding scale  · Will monitor closely as she will be on Solu-Medrol      COPD with acute exacerbation ([facility] [address] )  Assessment & Plan  · This is resolved   · Titrate down on steroids  · Continue treatment stated above  Hypernatremia  Assessment & Plan  · This is related to poor oral intake in free water deficit  · IV fluids  · Nephrology is following   · Trend labs      Acute on chronic respiratory failure with hypoxia (HCC)  Assessment & Plan  · Suspected to be secondary to acute infection  · The patient uses 2 L oxygen chronically at nursing home but has been able to stay on RA with pulse ox in mid to high 90s  · Improving  · Respiratory protocol  · Continue treatment stated above        VTE Pharmacologic Prophylaxis: Pharmacologic: Heparin    Patient Centered Rounds: I have performed bedside rounds with nursing staff today  Discussions with Specialists or Other Care Team Provider: n/a  Education and Discussions with Family / Patient: left message with daughter's cell    Time Spent for Care: 20 minutes  More than 50% of total time spent on counseling and coordination of care as described above  Current Length of Stay: 4 day(s)    Current Patient Status: Inpatient   Certification Statement: The patient will continue to require additional inpatient hospital stay due to bacteremia    Discharge Plan: pending hospital course    Code Status: Level 1 - Full Code    Subjective:   Patient seen examined  No acute events  Patient more alert    Objective:     Vitals:   Temp (24hrs), Av 7 °F (36 5 °C), Min:96 8 °F (36 °C), Max:98 8 °F (37 1 °C)    Temp:  [96 8 °F (36 °C)-98 8 °F (37 1 °C)] 96 8 °F (36 °C)  HR:  [68-82] 76  Resp:  [16-18] 18  BP: (122-180)/(54-74) 167/62  SpO2:  [95 %-97 %] 97 %  Body mass index is 27 22 kg/m²  Input and Output Summary (last 24 hours): Intake/Output Summary (Last 24 hours) at 2019 0853  Last data filed at 2019 0504  Gross per 24 hour   Intake 0 ml   Output 675 ml   Net -675 ml       Physical Exam:     Physical Exam   Constitutional: She appears well-developed and well-nourished  HENT:   Head: Normocephalic and atraumatic  Eyes: Pupils are equal, round, and reactive to light  EOM are normal    Neck: Normal range of motion  Neck supple  Pulmonary/Chest: Effort normal    Coarse breath sounds noted bilaterally   Abdominal: Soft  Bowel sounds are normal    Musculoskeletal: Normal range of motion  Neurological: She is alert  Oriented to person only which appears to be baseline   Skin: Skin is warm  Pressure ulcers noted   Psychiatric:   Unable to assess   Nursing note and vitals reviewed  Additional Data:     Labs:    Results from last 7 days   Lab Units 02/18/19  0459  02/15/19  0503 02/14/19  0815   WBC Thousand/uL 12 20*   < > 7 90 12 30*   HEMOGLOBIN g/dL 10 9*   < > 10 3* 11 0*   HEMATOCRIT % 33 8*   < > 31 8* 34 5*   PLATELETS Thousands/uL 247   < > 176 209   NEUTROS PCT %  --   --   --  81*   LYMPHS PCT %  --   --   --  9*   LYMPHO PCT %  --   --  9*  --    MONOS PCT %  --   --   --  9   MONO PCT %  --   --  2*  --    EOS PCT %  --   --   --  1    < > = values in this interval not displayed  Results from last 7 days   Lab Units 02/18/19  0459   POTASSIUM mmol/L 3 0*   CHLORIDE mmol/L 116*   CO2 mmol/L 23   BUN mg/dL 30*   CREATININE mg/dL 0 95   CALCIUM mg/dL 8 7   ALK PHOS U/L 36*   ALT U/L 12   AST U/L 13     Results from last 7 days   Lab Units 02/14/19  0815   INR  1 22     Results from last 7 days   Lab Units 02/18/19  0613 02/18/19  0132 02/17/19  2023 02/17/19  1546 02/17/19  1130 02/17/19  0556 02/16/19  2335 02/16/19  1959 02/16/19  1824 02/16/19  1209 02/16/19  0604 02/16/19  0035   POC GLUCOSE mg/dl 181* 169* 184* 244* 287* 233* 329* 294* 255* 341* 335* 278*           * I Have Reviewed All Lab Data Listed Above  * Additional Pertinent Lab Tests Reviewed:  [facility] 66 Admission  Reviewed    Imaging:  Imaging Reports Reviewed Today Include: n/a    Recent Cultures (last 7 days):     Results from last 7 days   Lab Units 02/14/19  1404 02/14/19  1323 02/14/19  1246 02/14/19  5623 02/14/19  0815   BLOOD CULTURE   --   --   --  Escherichia coli* Escherichia coli*   GRAM STAIN RESULT  No polys seen*  Rare Gram negative rods*  --   --  Gram negative rods* Gram negative rods*   URINE CULTURE   --  >100,000 cfu/ml Escherichia coli*  10,000-19,000 cfu/ml Escherichia coli*  >100,000 cfu/ml Escherichia coli*  --   --   --    WOUND CULTURE  Few Colonies of - STRAIN #1 Escherichia coli*  Few Colonies of - STRAIN #2 Escherichia coli*  1+ Growth of Beta Hemolytic Streptococcus Group B*  --   --   --   --    INFLUENZA B PCR   --   --   --   --  Not Detected   RSV PCR   --   --   --   --  Not Detected   LEGIONELLA URINARY ANTIGEN   --   --  Negative  --   --        Last 24 Hours Medication List:     Current Facility-Administered Medications:  acetaminophen 650 mg Rectal Q4H PRN Bri Laguna PA-C    acetaminophen 650 mg Oral Q6H PRN Brian Alvarez MD    albuterol 2 5 mg Nebulization Q4H PRN Brian Alvarez MD    amLODIPine 10 mg Oral Daily Brian Alvarez MD    ammonium lactate  Topical BID PRN Brian Alvarez MD    aspirin 81 mg Oral Daily Brian Alvarez MD    atorvastatin 10 mg Oral Daily Brian Alvarez MD    bisacodyl 10 mg Oral Daily PRN Brian Alvarez MD    dextrose 5 % with KCl 20 mEq/L 100 mL/hr Intravenous Continuous Raul Ham DO    famotidine 20 mg Intravenous Q12H [facility] ALY Lee    ferrous sulfate 325 mg Oral TID With Meals Brian Alvarez MD    gabapentin 100 mg Oral BID Brian Alvarez MD    heparin (porcine) 5,000 Units Subcutaneous [facility] Brian Alvarez MD    hydrALAZINE 10 mg Intravenous Q6H [facility] ALY Lee    HYDROcodone-acetaminophen 1 tablet Oral Q6H PRN Brian Alvarez MD    insulin glargine 30 Units Subcutaneous HS ALY Lee    insulin lispro 1-5 Units Subcutaneous HS Brian Alvarez MD    insulin lispro 1-6 Units Subcutaneous Kelly Jama MD    isosorbide dinitrate 30 mg Oral BID after meals Brian Alvarez MD    LORazepam 0 5 mg Oral Q8H PRN Brian Alvarez MD megestrol 200 mg Oral Daily Jamie Kimball MD    menthol-zinc oxide  Topical BID Jamie Kimball MD    metoprolol 5 mg Intravenous Q6H PRN Jamie Kimball MD    metoprolol 5 mg Intravenous Q6H ALY Campos    piperacillin-tazobactam 3 375 g Intravenous Q6H ALY Campos Last Rate: 3 375 g (02/18/19 0345)   polyethylene glycol 17 g Oral Daily Jamie Kimball MD    potassium chloride 20 mEq Intravenous Q2H Mauricio Umaña DO    [START ON 2/19/2019] predniSONE 20 mg Oral Daily Jamie Kimball MD    senna 1 tablet Oral Daily Jamie Kimball MD    sertraline 50 mg Oral Daily Jamie Kimball MD    tiotropium 18 mcg Inhalation Daily Jamie Kimball MD         Today, Patient Was Seen By: Jamie Kimball MD    ** Please Note: Dictation voice to text software may have been used in the creation of this document   **

## 2019-02-18 NOTE — ASSESSMENT & PLAN NOTE
· This is related to poor oral intake in free water deficit  · IV fluids  · Nephrology is following   · Trend labs

## 2019-02-18 NOTE — TELEMEDICINE
TeleConsultation - Infectious Disease   Gilberto Gallo Bejarano 66 y o  female MRN: [id number]  Unit/Bed#: -02 Encounter: [id number]      REQUIRED DOCUMENTATION:     1  This service was provided via Telemedicine  2  Provider located at Home  3  TeleMed provider: Luecro Paulino MD   4  Identify all parties in room with patient during tele consult:  RN  5  After connecting through [employer], patient was identified by name and date of birth and assistant checked wristband  Patient was then informed that this was a Telemedicine visit and that the exam was being conducted confidentially over secure lines  My office door was closed  No one else was in the room  Patient acknowledged consent and understanding of privacy and security of the Telemedicine visit, and gave us permission to have the assistant stay in the room in order to assist with the history and to conduct the exam   I informed the patient that I have reviewed their record in Epic and presented the opportunity for them to ask any questions regarding the visit today  The patient agreed to participate  Assessment/Plan     Assessment/Plan:    66year old diabetic female presents with sepsis, E coli bacteremia    1  Sepsis, present on admission  - Cause of sepsis is E coli bacteremia  - Clinically stable and asymptomatic, afebrile, declining procalcitonin but with persistent, albeit mild, leukocytosis    · Management as below    2  E coli bacteremia  - Source of bacteremia is likely secondary to UTI  Patient has dementia and is unable to provide any localizing symptoms  She has an underlying sacral and gluteal wound but this does not appear acutely infected    · Discontinue Pip-Tazo and switch to Cefepime 2g iv q12h  · Monitor clinical course  · Recommend completing 7 days of therapy on 2/20  · Recommend CT abdomen if patient clinically worsens or has worsening unexplained leukocytosis    3   Stage 2 left gluteal ulcer and Stage 3 sacral decubitus ulcer  - No signs of wound infection  - Superficial wound culture likely represents colonization  - Prognosis guarded given underlying comorbidities    · Recommend optimizing nutrition, offloading and wound care    4  Type 2 diabetes, improving control  Management per primary team    5  COPD with acute on chronic hypoxic respiratory failure  - Improved  - CXR shows no infiltrate, patient has no respiratory symptoms to suggest pneumonia    Management per primary team    History of Present Illness   Reason for Consult / Principal Problem: Bacteremia  Hx and PE limited by: Dementia  HPI: Jae Coon is a 66y o  year old diabetic femalewith COPD and history of stroke/dementia who is a long term resident of a nursing home  She has a chronic sacral and left gluteal decubitus ulcer  She presented to the ER on 2/14 with acute onset shortness of breath, hypoxia and cough  History is limited from the patient and is obtained mainly from the medical records  In the ER, she was tachypneic, tachycardic and hypoxic  Labs were notable for lactic acidosis with leukocytosis and elevated creatinine  EKG noted no acute ST changes  CXR showed a R basilar opacity thought to represent atelectasis  She was admitted for suspected pneumonia and started on Vancomycin and Zosyn which was switched to cefepime and flagyl and switched back to zosyn on 2/17  Since admission she has had no fever but has had persistent leukocytosis  Blood cultures from admission are growing E coli  Urine culture has also grown E coli  A superficial wound culture from sacral ulcer has grown few cols of E coli and 1+ GBS  Denies nausea, vomiting, diarrhea or rash and is tolerating antibiotics well  Infectious disease is being consulted for diagnostic work up and antibiotic management        Inpatient consult to Infectious Diseases  Consult performed by: Ladan Simons MD  Consult ordered by: ALY Campos          Review of Systems Unable to perform ROS: Dementia     A [id number] point system-based review of systems is otherwise negative  Historical Information   Past Medical History:   Diagnosis Date    Anemia     CHF [facility]congestive heart failure) ([facility])     COPD (chronic obstructive pulmonary disease) ([facility])     home 02 2l    Diabetes mellitus ([facility])     DJD (degenerative joint disease)     Gait abnormality     GERD (gastroesophageal reflux disease)     Hyperlipidemia     Hypertension     Stroke [facility])      Past Surgical History:   Procedure Laterality Date    ABDOMINAL SURGERY       Social History   Social History     Substance and Sexual Activity   Alcohol Use Never    Frequency: Never     Social History     Substance and Sexual Activity   Drug Use Never     Social History     Tobacco Use   Smoking Status Never Smoker   Smokeless Tobacco Never Used     Family History: non-contributory    Meds/Allergies   all current active meds have been reviewed    Allergies   Allergen Reactions    Codeine     Demerol [Meperidine]     Nsaids     Protonix [Pantoprazole]        Objective       Intake/Output Summary (Last 24 hours) at 2/18/2019 1241  Last data filed at 2/18/2019 0504  Gross per 24 hour   Intake 0 ml   Output 675 ml   Net -675 ml       Invasive Devices:   Peripheral IV 02/16/19 Left Antecubital (Active)   Site Assessment Clean;Dry; Intact 2/18/2019 10:00 AM   Dressing Type Transparent 2/18/2019 10:00 AM   Line Status Blood return noted; Flushed 2/18/2019 10:00 AM   Dressing Status Clean;Dry; Intact 2/18/2019 10:00 AM       Urethral Catheter Non-latex 16 Fr   (Active)   Amt returned on insertion(mL) 200 mL 2/14/2019 12:15 PM   Site Assessment Clean;Skin intact 2/18/2019 10:01 AM   Collection Container Standard drainage bag 2/18/2019 10:01 AM   Securement Method Leg strap 2/18/2019 10:01 AM   Output (mL) 650 mL 2/17/2019  5:49 AM       Physical Exam   Constitutional:   Elderly, chronically ill appearing   HENT:   Head: Normocephalic and atraumatic  Eyes: Pupils are equal, round, and reactive to light  EOM are normal    Neck: Normal range of motion  Cardiovascular: Normal rate, regular rhythm and normal heart sounds  Exam reveals no friction rub  No murmur heard  Pulmonary/Chest: Breath sounds normal  No stridor  No respiratory distress  She has no wheezes  Abdominal: Soft  Bowel sounds are normal    Musculoskeletal: Normal range of motion  She exhibits no edema, tenderness or deformity  Neurological: She is alert  Skin: No rash noted  No erythema  No pallor  Stage 2 left gluteal wound noted without periwound erythema   Nursing note and vitals reviewed  Physical exam findings reported by bedside and primary medical team staff    Lab Results: I have personally reviewed pertinent labs  I have personally reviewed these studies  Imaging Studies: I have personally reviewed pertinent films in PACS  EKG, Pathology, and Other Studies: I have personally reviewed pertinent films in PACS    Counseling / Coordination of Care  Total 70 minutes communication with the patient via telehealth

## 2019-02-18 NOTE — ASSESSMENT & PLAN NOTE
No results found for: HGBA1C    Recent Labs     02/17/19  1546 02/17/19  2023 02/18/19  0132 02/18/19  0613   POCGLU 244* 184* 169* 181*       Blood Sugar Average: Last 72 hrs:  (P) 171 [id number]   · Resume Lantus   · Continue on insulin sliding scale  · Will monitor closely as she will be on Solu-Medrol

## 2019-02-18 NOTE — SPEECH THERAPY NOTE
Speech Language/Pathology    Speech/Language Pathology Progress Note    Patient Name: Lizett Lira  Today's Date: 2/18/2019     Problem List  Patient Active Problem List   Diagnosis    Bacteremia due to Escherichia coli    Acute on chronic respiratory failure with hypoxia ([facility] [address] )    Pneumonia due to infectious organism    Bronchospasm    Hypernatremia    Renal insufficiency    COPD with acute exacerbation ([facility] [address] )    Type 2 diabetes mellitus with complication, without long-term current use of insulin ([facility])    Pressure ulcer of left buttock, stage 2    Other dysphagia        Past Medical History  Past Medical History:   Diagnosis Date    Anemia     CHF (congestive heart failure) ([facility] [address] [facility]     COPD (chronic obstructive pulmonary disease) ([facility] [address] )     home 02 2l    [facility]     DJD (degenerative joint disease)     Gait abnormality     GERD (gastroesophageal reflux disease)     Hyperlipidemia     Hypertension     Stroke [facility])         Past Surgical History  Past Surgical History:   Procedure Laterality Date    ABDOMINAL SURGERY           Subjective:  More awake, alert and responsive intermittently to simple questions  Objective:  Seen for swallow therapy to determine safety for po diet initiation  Pt able to follow oral-motor directives with adequate tongue movement and mild  Facial weakness  Pt is edentulous  Trialed with pureed foods and HTL liquids via tsp /cup (premorbid diet level at [facility])  Adequate retrieval from tsp and cup  Delayed oral manipulation/trnasfer with questionable posterior spillage  Pharyngeal swallow consistent, delayed at times  and appears compete/strong  Vocal quality clear post trials    Assessment:  Appears to be swallowing better  More awake, alert and responsive    Plan/Recommendations:  Resume level 1 diet with honey thick liquids via sip to cup sips  Staff to feed pt slowly  Aspiration precautions (90 degrees upright for all po)    D/w rolo and MD Adam Gunderson Leland Fischer MA, CCC/SLP  [id number]  [email or web address]  Available via [email or web address] text

## 2019-02-19 LAB
ALBUMIN SERPL BCP-MCNC: 2.7 G/DL (ref 3.5–5.7)
ALP SERPL-CCNC: 31 U/L (ref 55–165)
ALT SERPL W P-5'-P-CCNC: 11 U/L (ref 7–52)
ANION GAP SERPL CALCULATED.3IONS-SCNC: 8 MMOL/L (ref 4–13)
AST SERPL W P-5'-P-CCNC: 8 U/L (ref 13–39)
BILIRUB SERPL-MCNC: 0.3 MG/DL (ref 0.2–1)
BUN SERPL-MCNC: 34 MG/DL (ref 7–25)
CALCIUM SERPL-MCNC: 8.3 MG/DL (ref 8.6–10.5)
CHLORIDE SERPL-SCNC: 115 MMOL/L (ref 98–107)
CO2 SERPL-SCNC: 22 MMOL/L (ref 21–31)
CREAT SERPL-MCNC: 1.19 MG/DL (ref 0.6–1.2)
ERYTHROCYTE [DISTWIDTH] IN BLOOD BY AUTOMATED COUNT: 14.6 % (ref 11.5–14.5)
GFR SERPL CREATININE-BSD FRML MDRD: 44 ML/MIN/1.73SQ M
GLUCOSE SERPL-MCNC: 189 MG/DL (ref 65–140)
GLUCOSE SERPL-MCNC: 192 MG/DL (ref 65–140)
GLUCOSE SERPL-MCNC: 333 MG/DL (ref 65–140)
GLUCOSE SERPL-MCNC: 364 MG/DL (ref 65–140)
GLUCOSE SERPL-MCNC: 364 MG/DL (ref 65–99)
HCT VFR BLD AUTO: 29.6 % (ref 42–47)
HGB BLD-MCNC: 9.5 G/DL (ref 12–16)
MCH RBC QN AUTO: 33 PG (ref 26–34)
MCHC RBC AUTO-ENTMCNC: 32.2 G/DL (ref 31–37)
MCV RBC AUTO: 103 FL (ref 81–99)
PLATELET # BLD AUTO: 214 THOUSANDS/UL (ref 149–390)
PMV BLD AUTO: 9.9 FL (ref 8.6–11.7)
POTASSIUM SERPL-SCNC: 3.1 MMOL/L (ref 3.5–5.5)
PROT SERPL-MCNC: 5.4 G/DL (ref 6.4–8.9)
RBC # BLD AUTO: 2.89 MILLION/UL (ref 3.9–5.2)
SODIUM SERPL-SCNC: 145 MMOL/L (ref 134–143)
WBC # BLD AUTO: 12 THOUSAND/UL (ref 4.8–10.8)

## 2019-02-19 PROCEDURE — 80053 COMPREHEN METABOLIC PANEL: CPT | Performed by: INTERNAL MEDICINE

## 2019-02-19 PROCEDURE — 85027 COMPLETE CBC AUTOMATED: CPT | Performed by: INTERNAL MEDICINE

## 2019-02-19 PROCEDURE — 82948 REAGENT STRIP/BLOOD GLUCOSE: CPT

## 2019-02-19 PROCEDURE — 92526 ORAL FUNCTION THERAPY: CPT

## 2019-02-19 PROCEDURE — 99232 SBSQ HOSP IP/OBS MODERATE 35: CPT | Performed by: INTERNAL MEDICINE

## 2019-02-19 PROCEDURE — 94760 N-INVAS EAR/PLS OXIMETRY 1: CPT

## 2019-02-19 RX ORDER — PREDNISONE 10 MG/1
10 TABLET ORAL DAILY
Status: DISCONTINUED | OUTPATIENT
Start: 2019-02-19 | End: 2019-02-19

## 2019-02-19 RX ORDER — HYDRALAZINE HYDROCHLORIDE 20 MG/ML
10 INJECTION INTRAMUSCULAR; INTRAVENOUS EVERY 6 HOURS PRN
Status: DISCONTINUED | OUTPATIENT
Start: 2019-02-19 | End: 2019-02-21 | Stop reason: HOSPADM

## 2019-02-19 RX ORDER — FAMOTIDINE 20 MG/1
20 TABLET, FILM COATED ORAL 2 TIMES DAILY
Status: DISCONTINUED | OUTPATIENT
Start: 2019-02-19 | End: 2019-02-21 | Stop reason: HOSPADM

## 2019-02-19 RX ORDER — INSULIN GLARGINE 100 [IU]/ML
45 INJECTION, SOLUTION SUBCUTANEOUS
Status: DISCONTINUED | OUTPATIENT
Start: 2019-02-19 | End: 2019-02-21 | Stop reason: HOSPADM

## 2019-02-19 RX ORDER — POTASSIUM CHLORIDE 14.9 MG/ML
20 INJECTION INTRAVENOUS
Status: COMPLETED | OUTPATIENT
Start: 2019-02-19 | End: 2019-02-19

## 2019-02-19 RX ADMIN — ANORECTAL OINTMENT: 15.7; .44; 24; 20.6 OINTMENT TOPICAL at 17:58

## 2019-02-19 RX ADMIN — STANDARDIZED SENNA CONCENTRATE 8.6 MG: 8.6 TABLET ORAL at 10:13

## 2019-02-19 RX ADMIN — FERROUS SULFATE TAB 325 MG (65 MG ELEMENTAL FE) 325 MG: 325 (65 FE) TAB at 15:51

## 2019-02-19 RX ADMIN — POTASSIUM CHLORIDE 20 MEQ: 200 INJECTION, SOLUTION INTRAVENOUS at 10:25

## 2019-02-19 RX ADMIN — INSULIN GLARGINE 45 UNITS: 100 INJECTION, SOLUTION SUBCUTANEOUS at 22:26

## 2019-02-19 RX ADMIN — CEFEPIME HYDROCHLORIDE 2000 MG: 2 INJECTION, SOLUTION INTRAVENOUS at 05:02

## 2019-02-19 RX ADMIN — METOPROLOL TARTRATE 25 MG: 25 TABLET, FILM COATED ORAL at 21:40

## 2019-02-19 RX ADMIN — SERTRALINE HYDROCHLORIDE 50 MG: 25 TABLET ORAL at 10:13

## 2019-02-19 RX ADMIN — METOPROLOL TARTRATE 25 MG: 25 TABLET, FILM COATED ORAL at 10:14

## 2019-02-19 RX ADMIN — INSULIN LISPRO 1 UNITS: 100 INJECTION, SOLUTION INTRAVENOUS; SUBCUTANEOUS at 22:27

## 2019-02-19 RX ADMIN — GABAPENTIN 100 MG: 100 CAPSULE ORAL at 10:14

## 2019-02-19 RX ADMIN — ATORVASTATIN CALCIUM 10 MG: 10 TABLET, FILM COATED ORAL at 10:14

## 2019-02-19 RX ADMIN — HEPARIN SODIUM 5000 UNITS: 5000 INJECTION INTRAVENOUS; SUBCUTANEOUS at 22:27

## 2019-02-19 RX ADMIN — POTASSIUM CHLORIDE AND DEXTROSE MONOHYDRATE 150 ML/HR: 150; 5 INJECTION, SOLUTION INTRAVENOUS at 15:51

## 2019-02-19 RX ADMIN — GABAPENTIN 100 MG: 100 CAPSULE ORAL at 17:58

## 2019-02-19 RX ADMIN — HEPARIN SODIUM 5000 UNITS: 5000 INJECTION INTRAVENOUS; SUBCUTANEOUS at 15:51

## 2019-02-19 RX ADMIN — AMLODIPINE BESYLATE 10 MG: 5 TABLET ORAL at 10:14

## 2019-02-19 RX ADMIN — INSULIN LISPRO 8 UNITS: 100 INJECTION, SOLUTION INTRAVENOUS; SUBCUTANEOUS at 11:28

## 2019-02-19 RX ADMIN — POTASSIUM CHLORIDE 20 MEQ: 200 INJECTION, SOLUTION INTRAVENOUS at 12:51

## 2019-02-19 RX ADMIN — FAMOTIDINE 20 MG: 20 TABLET ORAL at 17:58

## 2019-02-19 RX ADMIN — INSULIN LISPRO 8 UNITS: 100 INJECTION, SOLUTION INTRAVENOUS; SUBCUTANEOUS at 17:59

## 2019-02-19 RX ADMIN — ASPIRIN 81 MG: 81 TABLET, COATED ORAL at 10:13

## 2019-02-19 RX ADMIN — FAMOTIDINE 20 MG: 20 TABLET ORAL at 10:14

## 2019-02-19 RX ADMIN — HEPARIN SODIUM 5000 UNITS: 5000 INJECTION INTRAVENOUS; SUBCUTANEOUS at 05:01

## 2019-02-19 RX ADMIN — FERROUS SULFATE TAB 325 MG (65 MG ELEMENTAL FE) 325 MG: 325 (65 FE) TAB at 10:25

## 2019-02-19 RX ADMIN — ISOSORBIDE DINITRATE 30 MG: 10 TABLET ORAL at 17:58

## 2019-02-19 RX ADMIN — TIOTROPIUM BROMIDE 18 MCG: 18 CAPSULE ORAL; RESPIRATORY (INHALATION) at 10:25

## 2019-02-19 RX ADMIN — CEFEPIME HYDROCHLORIDE 2000 MG: 2 INJECTION, SOLUTION INTRAVENOUS at 15:51

## 2019-02-19 RX ADMIN — ISOSORBIDE DINITRATE 30 MG: 10 TABLET ORAL at 10:14

## 2019-02-19 RX ADMIN — INSULIN LISPRO 8 UNITS: 100 INJECTION, SOLUTION INTRAVENOUS; SUBCUTANEOUS at 10:26

## 2019-02-19 RX ADMIN — POTASSIUM CHLORIDE AND DEXTROSE MONOHYDRATE 100 ML/HR: 150; 5 INJECTION, SOLUTION INTRAVENOUS at 03:20

## 2019-02-19 RX ADMIN — ANORECTAL OINTMENT: 15.7; .44; 24; 20.6 OINTMENT TOPICAL at 10:25

## 2019-02-19 NOTE — PROGRESS NOTES
Progress Note - Nephrology   Corey Nina Bejarano 66 y o  female MRN: [id number]  Unit/Bed#: -02 Encounter: [id number]    A/P:  1  Acute kidney injury-resolved  2  Hypernatremia   Increase D5W up to 150 mL/hour, patient was tolerating some fluids orally  Should be continued to be encouraged toward self hydration, will supplement hydration the meantime  3  Hypokalemia   Will give an additional 40 mEq of IV potassium chloride today, in addition the fluid to continue to retain 20 mEq per L of potassium chloride  4  Bacteremia due to E coli   Continue antibiotic regimen as indicated by Infectious Disease  5  Dysphagia with possible PEG placement  6  Stage II pressure ulcer left buttock     Follow up reason for today's visit:  Acute kidney injury/electrolyte disorders    Bacteremia due to Escherichia coli    Patient Active Problem List   Diagnosis    Bacteremia due to Escherichia coli    Acute on chronic [facility] failure with hypoxia ([facility])    Pneumonia due to infectious organism    Bronchospasm    Hypernatremia    Renal insufficiency    COPD with acute exacerbation ([facility] [address] )    Type 2 diabetes mellitus with complication, without long-term current use of insulin ([facility])    Pressure ulcer of left buttock, stage 2    Other dysphagia         Subjective:   No acute events overnight, patient is apparently starting to the tolerate oral intake  Objective:     Vitals: Blood pressure 164/54, pulse 71, temperature (!) 97 1 °F (36 2 °C), temperature source Tympanic, resp  rate 16, height 5' 8" (1 727 m), weight 81 2 kg (179 lb), SpO2 97 %, not currently breastfeeding  ,Body mass index is 27 22 kg/m²  Weight (last 2 days)     None            Intake/Output Summary (Last 24 hours) at 2/19/2019 0857  Last data filed at 2/19/2019 0806  Gross per 24 hour   Intake 1120 ml   Output 450 ml   Net 670 ml     I/O last 3 completed shifts: In: 1120 [P O :120;  I V :1000]  Out: 725 [Urine:725]    Urethral Catheter Non-latex 16 Fr  (Active)   Amt returned on insertion(mL) 200 mL 2/14/2019 12:15 PM   Site Assessment Clean;Skin intact 2/15/2019  7:50 PM   Collection Container Standard drainage bag 2/15/2019  7:50 PM   Securement Method Securing device (Describe) 2/15/2019  7:50 PM   Output (mL) 650 mL 2/17/2019  5:49 AM       Physical Exam: /54 (BP Location: Left arm)   Pulse 71   Temp (!) 97 1 °F (36 2 °C) (Tympanic)   Resp 16   Ht 5' 8" (1 727 m)   Wt 81 2 kg (179 lb)   LMP  (LMP Unknown)   SpO2 97%   Breastfeeding? No   BMI 27 22 kg/m²     General Appearance:    Patient not responding to questions   Head:    Normocephalic, without obvious abnormality, atraumatic   Eyes:    Conjunctiva/corneas clear   Ears:    Normal external ears   Nose:   Nares normal, septum midline, mucosa normal, no drainage    or sinus tenderness   Throat:   Lips, mucosa, and tongue normal; teeth and gums normal   Neck:   Supple   Back:     Symmetric, no curvature, ROM normal, no CVA tenderness   Lungs:     Clear to auscultation bilaterally, respirations unlabored   Chest wall:    No tenderness or deformity   Heart:    Regular rate and rhythm, S1 and S2 normal, no murmur, rub   or gallop   Abdomen:     Soft, non-tender, bowel sounds active   Extremities:   Extremities normal, atraumatic, no cyanosis or edema   Skin:   Skin color, texture, turgor normal, no rashes or lesions   Lymph nodes:   Cervical normal   Neurologic:   CNII-XII intact            Lab, Imaging and other studies: I have personally reviewed pertinent labs    CBC:   Lab Results   Component Value Date    WBC 12 00 (H) 02/19/2019    HGB 9 5 (L) 02/19/2019    HCT 29 6 (L) 02/19/2019     (H) 02/19/2019     02/19/2019    MCH 33 0 02/19/2019    MCHC 32 2 02/19/2019    RDW 14 6 (H) 02/19/2019    MPV 9 9 02/19/2019     CMP:   Lab Results   Component Value Date    K 3 1 (L) 02/19/2019     (H) 02/19/2019    CO2 22 02/19/2019    BUN 34 (H) 02/19/2019    CREATININE 1 19 02/19/2019    CALCIUM 8 3 (L) 02/19/2019    AST 8 (L) 02/19/2019    ALT 11 02/19/2019    ALKPHOS 31 (L) 02/19/2019    EGFR 44 02/19/2019         Results from last 7 days   Lab Units 02/19/19  0502 02/18/19  0459 02/17/19  0434   POTASSIUM mmol/L 3 1* 3 0* 3 2*   CHLORIDE mmol/L 115* 116* 113*   CO2 mmol/L 22 23 24   BUN mg/dL 34* 30* 33*   CREATININE mg/dL 1 19 0 95 0 83   CALCIUM mg/dL 8 3* 8 7 8 8   ALK PHOS U/L 31* 36* 37*   ALT U/L 11 12 9   AST U/L 8* 13 8*         Phosphorus: No results found for: PHOS  Magnesium: No results found for: MG  Urinalysis: No results found for: COLORU, CLARITYU, SPECGRAV, PHUR, LEUKOCYTESUR, NITRITE, PROTEINUA, GLUCOSEU, KETONESU, BILIRUBINUR, BLOODU  Ionized Calcium: No results found for: CAION  Coagulation: No results found for: PT, INR, APTT  Troponin: No results found for: TROPONINI  ABG: No results found for: PHART, PHB0BTK, PO2ART, JFK1TCX, Q7DCRBQD, BEART, SOURCE  Radiology review:     IMAGING  No results found      Current Facility-Administered Medications   Medication Dose Route Frequency    acetaminophen (TYLENOL) rectal suppository 650 mg  650 mg Rectal Q4H PRN    acetaminophen (TYLENOL) tablet 650 mg  650 mg Oral Q6H PRN    albuterol inhalation solution 2 5 mg  2 5 mg Nebulization Q4H PRN    amLODIPine (NORVASC) tablet 10 mg  10 mg Oral Daily    ammonium lactate (LAC-HYDRIN) 12 % lotion   Topical BID PRN    aspirin (ECOTRIN LOW STRENGTH) EC tablet 81 mg  81 mg Oral Daily    atorvastatin (LIPITOR) tablet 10 mg  10 mg Oral Daily    bisacodyl (DULCOLAX) EC tablet 10 mg  10 mg Oral Daily PRN    cefepime (MAXIPIME) IVPB (premix) 2,000 mg  2,000 mg Intravenous Q12H    dextrose 5 % with KCl 20 mEq/L infusion (premix)  150 mL/hr Intravenous Continuous    famotidine (PEPCID) tablet 20 mg  20 mg Oral BID    ferrous sulfate tablet 325 mg  325 mg Oral TID With Meals    gabapentin (NEURONTIN) capsule 100 mg  100 mg Oral BID    heparin (porcine) subcutaneous injection 5,000 Units  5,000 Units Subcutaneous Q8H [address]    hydrALAZINE (APRESOLINE) injection 10 mg  10 mg Intravenous Q6H PRN    HYDROcodone-acetaminophen (NORCO) 5-325 mg per tablet 1 tablet  1 tablet Oral Q6H PRN    insulin glargine (LANTUS) subcutaneous injection 45 Units 0 45 mL  45 Units Subcutaneous HS    insulin lispro (HumaLOG) 100 units/mL subcutaneous injection 1-5 Units  1-5 Units Subcutaneous HS    insulin lispro (HumaLOG) 100 units/mL subcutaneous injection 1-6 Units  1-6 Units Subcutaneous Q6H    insulin lispro (HumaLOG) 100 units/mL subcutaneous injection 8 Units  8 Units Subcutaneous TID With Meals    isosorbide dinitrate (ISORDIL) tablet 30 mg  30 mg Oral BID after meals    LORazepam (ATIVAN) tablet 0 5 mg  0 5 mg Oral Q8H PRN    megestrol (MEGACE) oral suspension 200 mg  200 mg Oral Daily    menthol-zinc oxide (CALMOSEPTINE) 0 44-20 6 % ointment   Topical BID    metoprolol (LOPRESSOR) injection 5 mg  5 mg Intravenous Q6H PRN    metoprolol tartrate (LOPRESSOR) tablet 25 mg  25 mg Oral Q12H ELIO    potassium chloride 20 mEq IVPB (premix)  20 mEq Intravenous Q2H    senna (SENOKOT) tablet 8 6 mg  1 tablet Oral Daily    sertraline (ZOLOFT) tablet 50 mg  50 mg Oral Daily    tiotropium (SPIRIVA) capsule for inhaler 18 mcg  18 mcg Inhalation Daily     Medications Discontinued During This Encounter   Medication Reason    ammonium lactate (LAC-HYDRIN) 12 % cream Formulary change    vancomycin (VANCOCIN) 1,250 mg in sodium chloride 0 9 % 250 mL IVPB Dose adjustment    albuterol inhalation solution 2 5 mg     insulin glargine (LANTUS) subcutaneous injection 46 Units 0 46 mL     insulin lispro (HumaLOG) 100 units/mL subcutaneous injection 1-6 Units     sodium chloride 0 9 % infusion     dextrose 5 % and sodium chloride 0 33 % infusion     piperacillin-tazobactam (ZOSYN) 2 25 g in sodium chloride 0 9 % 50 mL IVPB     vancomycin (VANCOCIN) 750 mg in sodium chloride 0 9 % 250 mL IVPB     methylPREDNISolone sodium succinate (Solu-MEDROL) injection 40 mg     metoprolol tartrate (LOPRESSOR) tablet 25 mg     insulin glargine (LANTUS) subcutaneous injection 10 Units 0 1 mL     acetaminophen (TYLENOL) rectal suppository 325 mg     morphine injection 1 mg     polyethylene glycol (GLYCOLAX) bowel prep 17 g Formulary change    hydrALAZINE (APRESOLINE) injection 10 mg     insulin glargine (LANTUS) subcutaneous injection 20 Units 0 2 mL     famotidine (PEPCID) tablet 20 mg     methylPREDNISolone sodium succinate (Solu-MEDROL) injection 40 mg     cefepime (MAXIPIME) IVPB (premix) 2,000 mg     metroNIDAZOLE (FLAGYL) IVPB (premix) 500 mg     morphine injection 1 mg     potassium chloride (K-DUR,KLOR-CON) CR tablet 20 mEq     dextrose 5 % and sodium chloride 0 45 % infusion     methylPREDNISolone sodium succinate (Solu-MEDROL) injection 40 mg     piperacillin-tazobactam (ZOSYN) 3 375 g in sodium chloride 0 9 % 50 mL IVPB     metoprolol (LOPRESSOR) injection 5 mg     famotidine (PEPCID) injection 20 mg     hydrALAZINE (APRESOLINE) injection 10 mg     polyethylene glycol (MIRALAX) packet 17 g     insulin glargine (LANTUS) subcutaneous injection 30 Units 0 3 mL     predniSONE tablet 20 mg     predniSONE tablet 10 mg        [facility]

## 2019-02-19 NOTE — PROGRESS NOTES
Progress Note Leonor Bosworth Molochnick 1940, 66 y o  female MRN: [id number]    Unit/Bed#: -02 Encounter: [id number]    Primary Care Provider: Susan Minaya DO   Date and time admitted to hospital: 2/14/2019  7:25 AM        * Bacteremia due to Escherichia coli  Assessment & Plan  · Likely source is urinary  · Appreciate Infectious Disease input  · We will finish out 7 day course of cefepime, which ends tomorrow  · Plan for discharge thereafter    Pneumonia due to infectious organism  Assessment & Plan  · Initially aspiration pneumonia was suspected with history of CVA and dysphagia  · Repeated imaging 2/15 shows atelectasis   · Pneumonia is unlikely as patient does not have cough or sputum production   · Patient was seen by speech therapy, and was cleared to resume her home diet of level 1 puree, honey thick liquids  · Appreciate ID input    Other dysphagia  Assessment & Plan  · Appreciate speech input  · Resumed pureed diet with honey thick liquids    Pressure ulcer of left buttock, stage 2  Assessment & Plan  · Present on admission  · Wound care input appreciated   · Wound culture shows E coli and beta hemolytic streptococcus group B  · Appreciate ID input  · Continue cefepime to finish out a 7 day course which ends tomorrow  · Continue with local care with frequent turn and reposition    Type 2 diabetes mellitus with complication, without long-term current use of insulin [facility])  Assessment & Plan  No results found for: HGBA1C    Recent Labs     02/18/19  1556 02/18/19 2015 02/18/19  2332 02/19/19  0632   POCGLU 386* 500* 441* 364*       Blood Sugar Average: Last 72 hrs:  (P) 165 [id number]   · Noted to be hyperglycemic  · Home insulin regimen resumed  · Titrate insulin as necessary    COPD with acute exacerbation ([facility] [address] )  Assessment & Plan  · This is resolved   · Titrated off of steroids  · Continue treatment stated above      Hypernatremia  Assessment & Plan  · Resolved with free water replacement  · Appreciate Nephrology input      Acute on chronic respiratory failure with hypoxia (HCC)  Assessment & Plan  · Suspected to be secondary to acute infection  · The patient uses 2 L oxygen chronically at nursing home but has been able to stay on RA with pulse ox in mid to high 90s  · Improving  · Respiratory protocol  · Continue treatment stated above        VTE Pharmacologic Prophylaxis: Pharmacologic: Heparin    Patient Centered Rounds: I have performed bedside rounds with nursing staff today  Discussions with Specialists or Other Care Team Provider: n/a  Education and Discussions with Family / Patient: daughter over the phone    Time Spent for Care: 20 minutes  More than 50% of total time spent on counseling and coordination of care as described above  Current Length of Stay: 5 day(s)    Current Patient Status: Inpatient   Certification Statement: The patient will continue to require additional inpatient hospital stay due to bacteremia    Discharge Plan: dc to snf once antibiotic course finished    Code Status: Level 1 - Full Code    Subjective:   Patient seen examined  No acute events overnight  Patient unable to provide meaningful history    Objective:     Vitals:   Temp (24hrs), Av 5 °F (36 4 °C), Min:97 1 °F (36 2 °C), Max:97 7 °F (36 5 °C)    Temp:  [97 1 °F (36 2 °C)-97 7 °F (36 5 °C)] 97 1 °F (36 2 °C)  HR:  [65-79] 71  Resp:  [16-18] 16  BP: (125-164)/(53-65) 164/54  SpO2:  [92 %-97 %] 97 %  Body mass index is 27 22 kg/m²  Input and Output Summary (last 24 hours): Intake/Output Summary (Last 24 hours) at 2019 0824  Last data filed at 2019 0502  Gross per 24 hour   Intake 1120 ml   Output 450 ml   Net 670 ml       Physical Exam:     Physical Exam   Constitutional: She appears well-developed and well-nourished  HENT:   Head: Normocephalic and atraumatic  Eyes: Pupils are equal, round, and reactive to light  EOM are normal    Neck: Normal range of motion  Neck supple  Cardiovascular: Normal rate and regular rhythm  Pulmonary/Chest: Effort normal    Decreased breath sounds at bases bilaterally   Abdominal: Soft  Bowel sounds are normal    Musculoskeletal: Normal range of motion  Neurological: She is alert  Oriented to person only   Skin: Skin is warm  Psychiatric:   Unable to assess   Nursing note and vitals reviewed  Additional Data:     Labs:    Results from last 7 days   Lab Units 02/19/19  0502  02/15/19  0503 02/14/19  0815   WBC Thousand/uL 12 00*   < > 7 90 12 30*   HEMOGLOBIN g/dL 9 5*   < > 10 3* 11 0*   HEMATOCRIT % 29 6*   < > 31 8* 34 5*   PLATELETS Thousands/uL 214   < > 176 209   NEUTROS PCT %  --   --   --  81*   LYMPHS PCT %  --   --   --  9*   LYMPHO PCT %  --   --  9*  --    MONOS PCT %  --   --   --  9   MONO PCT %  --   --  2*  --    EOS PCT %  --   --   --  1    < > = values in this interval not displayed  Results from last 7 days   Lab Units 02/19/19  0502   POTASSIUM mmol/L 3 1*   CHLORIDE mmol/L 115*   CO2 mmol/L 22   BUN mg/dL 34*   CREATININE mg/dL 1 19   CALCIUM mg/dL 8 3*   ALK PHOS U/L 31*   ALT U/L 11   AST U/L 8*     Results from last 7 days   Lab Units 02/14/19  0815   INR  1 22     Results from last 7 days   Lab Units 02/19/19  0632 02/18/19  2332 02/18/19  2015 02/18/19  1556 02/18/19  1224 02/18/19  0639 02/18/19  0132 02/17/19  2023 02/17/19  1546 02/17/19  1130 02/17/19  0556 02/16/19  2335   POC GLUCOSE mg/dl 364* 441* 500* 386* 321* 181* 169* 184* 244* 287* 233* 329*           * I Have Reviewed All Lab Data Listed Above  * Additional Pertinent Lab Tests Reviewed:  [address] 66 Admission  Reviewed    Imaging:  Imaging Reports Reviewed Today Include: n/a    Recent Cultures (last 7 days):     Results from last 7 days   Lab Units 02/17/19  1120 02/14/19  1404 02/14/19  1323 02/14/19  1246 02/14/19  0835 02/14/19  0815   BLOOD CULTURE  No Growth at 24 hrs   --   --   --  Escherichia coli* Escherichia coli*   GRAM STAIN RESULT   --  No polys seen*  Rare Gram negative rods*  --   --  Gram negative rods* Gram negative rods*   URINE CULTURE   --   --  >100,000 cfu/ml Escherichia coli*  10,000-19,000 cfu/ml Escherichia coli*  >100,000 cfu/ml Escherichia coli*  --   --   --    WOUND CULTURE   --  Few Colonies of - STRAIN #1 Escherichia coli*  Few Colonies of - STRAIN #2 Escherichia coli*  1+ Growth of Beta Hemolytic Streptococcus Group B*  --   --   --   --    INFLUENZA B PCR   --   --   --   --   --  Not Detected   RSV PCR   --   --   --   --   --  Not Detected   LEGIONELLA URINARY ANTIGEN   --   --   --  Negative  --   --        Last 24 Hours Medication List:     Current Facility-Administered Medications:  acetaminophen 650 mg Rectal Q4H PRN Bri Laguna PA-C    acetaminophen 650 mg Oral Q6H PRN Brian Alvarez MD    albuterol 2 5 mg Nebulization Q4H PRN Brian Alvarez MD    amLODIPine 10 mg Oral Daily Brian Alvarez MD    ammonium lactate  Topical BID PRN Brian Alvarez MD    aspirin 81 mg Oral Daily Brian Alvarez MD    atorvastatin 10 mg Oral Daily Brian Alvarez MD    bisacodyl 10 mg Oral Daily PRN Brian Alvarez MD    cefepime 2,000 mg Intravenous Q12H Brian Alvarez MD Last Rate: 2,000 mg (02/19/19 0502)   dextrose 5 % with KCl 20 mEq/L 150 mL/hr Intravenous Continuous Brad Varvarelis, DO Last Rate: 100 mL/hr (02/19/19 0320)   famotidine 20 mg Oral BID Mariana Wright PA-C    ferrous sulfate 325 mg Oral TID With Meals Brian Alvarez MD    gabapentin 100 mg Oral BID Brian Alvarez MD    heparin (porcine) 5,000 Units Subcutaneous [facility] Brian Alvarez MD    hydrALAZINE 10 mg Intravenous Q6H PRN Mariana Wright PA-C    HYDROcodone-acetaminophen 1 tablet Oral Q6H PRN Brian Alvarez MD    insulin glargine 45 Units Subcutaneous HS Brian Alvarez MD    insulin lispro 1-5 Units Subcutaneous HS Brian Alvarez MD    insulin lispro 1-6 Units Subcutaneous Q6H Brian Alvarez MD    insulin lispro 8 Units Subcutaneous TID With Lizzy Ledezma MD    isosorbide dinitrate 30 mg Oral BID after meals Loretta Jimenez MD    LORazepam 0 5 mg Oral Q8H PRN Loretta Jimenez MD    megestrol 200 mg Oral Daily Loretta Jimenez MD    menthol-zinc oxide  Topical BID Loretta Jimenez MD    metoprolol 5 mg Intravenous Q6H PRN Loretta Jimenez MD    metoprolol tartrate 25 mg Oral Q12H [address] Abena Sanford PA-C    potassium chloride 20 mEq Intravenous Q2H Rajeev Jimenez DO    senna 1 tablet Oral Daily Loretta Jimenez MD    sertraline 50 mg Oral Daily Loretta Jimenez MD    tiotropium 18 mcg Inhalation Daily Loretta Jimenez MD         Today, Patient Was Seen By: Loretta Jimenez MD    ** Please Note: Dictation voice to text software may have been used in the creation of this document   **

## 2019-02-19 NOTE — ASSESSMENT & PLAN NOTE
· Likely source is urinary  · Appreciate Infectious Disease input  · We will finish out 7 day course of cefepime, which ends tomorrow  · Plan for discharge thereafter

## 2019-02-19 NOTE — SPEECH THERAPY NOTE
Speech/Language Pathology Progress Note    Patient Name: Lizett Lira  KNBJM'O Date: 2/19/2019      Subjective:  Pt seen for ST at lunch today, positioned upright in bed and with significantly reduced alertness/responsiveness  Objective:  Pt seen for diagnostic swallow tx for analysis of tolerance of puree diet and HTL  Low level of alertness, however with max cueing pt was able to open eyes briefly and open mouth for a few conservative PO trials  HTL offered via tsp and cup sip  Weak labial seal noted with anterior spillage  A-P transfer was reduced, with significantly delayed and weak swallow  Swallow initiated x3 trials only  Final trial of puree had to be manually removed from oral cavity with spoon  Assessment:  Level of alertness is not functional for safe PO intake  Discussed with RN, diet orders to remain in case pt should become more alert, however when she is lethargic and poorly responsive she should be kept NPO  RN in agreement  Plan/Recommendations: Only feed puree diet and HTL when pt is alert and responsive, otherwise pt should remain NPO  Suspect level of PO intake overall is not sufficient to meet pt's nutritional and hydration needs  Need to consider alternative means of nutrition (PEG tube) vs comfort care

## 2019-02-19 NOTE — PLAN OF CARE
Problem: Prexisting or High Potential for Compromised Skin Integrity  Goal: Skin integrity is maintained or improved  Description  INTERVENTIONS:  - Identify patients at risk for skin breakdown  - Assess and monitor skin integrity  - Assess and monitor nutrition and hydration status  - Monitor labs (i e  albumin)  - Assess for incontinence   - Turn and reposition patient  - Assist with mobility/ambulation  - Relieve pressure over bony prominences  - Avoid friction and shearing  - Provide appropriate hygiene as needed including keeping skin clean and dry  - Evaluate need for skin moisturizer/barrier cream  - Collaborate with interdisciplinary team (i e  Nutrition, Rehabilitation, etc )   - Patient/family teaching  Outcome: Progressing     Problem: Nutrition/Hydration-ADULT  Goal: Nutrient/Hydration intake appropriate for improving, restoring or maintaining nutritional needs  Description  Monitor and assess patient's nutrition/hydration status for malnutrition (ex- brittle hair, bruises, dry skin, pale skin and conjunctiva, muscle wasting, smooth red tongue, and disorientation)  Collaborate with interdisciplinary team and initiate plan and interventions as ordered  Monitor patient's weight and dietary intake as ordered or per policy  Utilize nutrition screening tool and intervene per policy  Determine patient's food preferences and provide high-protein, high-caloric foods as appropriate       INTERVENTIONS:  - Monitor oral intake, urinary output, labs, and treatment plans  - Assess nutrition and hydration status and recommend course of action  - Evaluate amount of meals eaten  - Assist patient with eating if necessary   - Allow adequate time for meals  - Recommend/ encourage appropriate diets, oral nutritional supplements, and vitamin/mineral supplements  - Order, calculate, and assess calorie counts as needed  - Recommend, monitor, and adjust tube feedings and TPN/PPN based on assessed needs  - Assess need for intravenous fluids  - Provide specific nutrition/hydration education as appropriate  - Include patient/family/caregiver in decisions related to nutrition    she has recently resumed her pureed meal plan with honey thick liquids  RDN to check her po intake  She has pressure injury areas noted  RDN to offer honey shakes at breakfast and lunch and check her tolerance  She is on pepcid and lipitor  Her BG was 164 H and albumin 3 0 L on 2-18  No new weight RDN to check her weight status  RDN to reassess her nutrition needs at high risk and follow PRN   Outcome: Progressing     Problem: Potential for Falls  Goal: Patient will remain free of falls  Description  INTERVENTIONS:  - Assess patient frequently for physical needs  -  Identify cognitive and physical deficits and behaviors that affect risk of falls    -  Ulysses fall precautions as indicated by assessment   - Educate patient/family on patient safety including physical limitations  - Instruct patient to call for assistance with activity based on assessment  - Modify environment to reduce risk of injury  - Consider OT/PT consult to assist with strengthening/mobility  Outcome: Progressing

## 2019-02-19 NOTE — ASSESSMENT & PLAN NOTE
· Present on admission  · Wound care input appreciated   · Wound culture shows E coli and beta hemolytic streptococcus group B  · Appreciate ID input  · Continue cefepime to finish out a 7 day course which ends tomorrow  · Continue with local care with frequent turn and reposition

## 2019-02-19 NOTE — PLAN OF CARE
Problem: Prexisting or High Potential for Compromised Skin Integrity  Goal: Skin integrity is maintained or improved  Description  INTERVENTIONS:  - Identify patients at risk for skin breakdown  - Assess and monitor skin integrity  - Assess and monitor nutrition and hydration status  - Monitor labs (i e  albumin)  - Assess for incontinence   - Turn and reposition patient  - Assist with mobility/ambulation  - Relieve pressure over bony prominences  - Avoid friction and shearing  - Provide appropriate hygiene as needed including keeping skin clean and dry  - Evaluate need for skin moisturizer/barrier cream  - Collaborate with interdisciplinary team (i e  Nutrition, Rehabilitation, etc )   - Patient/family teaching  Outcome: Progressing     Problem: Nutrition/Hydration-ADULT  Goal: Nutrient/Hydration intake appropriate for improving, restoring or maintaining nutritional needs  Description  Monitor and assess patient's nutrition/hydration status for malnutrition (ex- brittle hair, bruises, dry skin, pale skin and conjunctiva, muscle wasting, smooth red tongue, and disorientation)  Collaborate with interdisciplinary team and initiate plan and interventions as ordered  Monitor patient's weight and dietary intake as ordered or per policy  Utilize nutrition screening tool and intervene per policy  Determine patient's food preferences and provide high-protein, high-caloric foods as appropriate       INTERVENTIONS:  - Monitor oral intake, urinary output, labs, and treatment plans  - Assess nutrition and hydration status and recommend course of action  - Evaluate amount of meals eaten  - Assist patient with eating if necessary   - Allow adequate time for meals  - Recommend/ encourage appropriate diets, oral nutritional supplements, and vitamin/mineral supplements  - Order, calculate, and assess calorie counts as needed  - Recommend, monitor, and adjust tube feedings and TPN/PPN based on assessed needs  - Assess need for intravenous fluids  - Provide specific nutrition/hydration education as appropriate  - Include patient/family/caregiver in decisions related to nutrition    she has recently resumed her pureed meal plan with honey thick liquids  RDN to check her po intake  She has pressure injury areas noted  RDN to offer honey shakes at breakfast and lunch and check her tolerance  She is on pepcid and lipitor  Her BG was 164 H and albumin 3 0 L on 2-18  No new weight RDN to check her weight status  RDN to reassess her nutrition needs at high risk and follow PRN   Outcome: Progressing     Problem: Potential for Falls  Goal: Patient will remain free of falls  Description  INTERVENTIONS:  - Assess patient frequently for physical needs  -  Identify cognitive and physical deficits and behaviors that affect risk of falls    -  [place] fall precautions as indicated by assessment   - Educate patient/family on patient safety including physical limitations  - Instruct patient to call for assistance with activity based on assessment  - Modify environment to reduce risk of injury  - Consider OT/PT consult to assist with strengthening/mobility  Outcome: Progressing

## 2019-02-19 NOTE — NURSING NOTE
[facility]  [facility]  New orders in place   Will continue to monitor

## 2019-02-19 NOTE — SOCIAL WORK
Pt discussed during care coordination rounds  Pt not yet medically stable for discharge per Dr Sai Duran  She may be ready for discharge on 2/21  Pt is a bed hold at [facility] and will return to [facility] upon dischagre  Pt will need transport arranged  CM to follow

## 2019-02-19 NOTE — ASSESSMENT & PLAN NOTE
· Initially aspiration pneumonia was suspected with history of CVA and dysphagia    · Repeated imaging 2/15 shows atelectasis   · Pneumonia is unlikely as patient does not have cough or sputum production   · Patient was seen by speech therapy, and was cleared to resume her home diet of level 1 puree, honey thick liquids  · Appreciate ID input

## 2019-02-19 NOTE — ASSESSMENT & PLAN NOTE
No results found for: HGBA1C    Recent Labs     02/18/19  1556 02/18/19  2015 02/18/19  2332 02/19/19  0632   POCGLU 386* 500* 441* 364*       Blood Sugar Average: Last 72 hrs:  (P) 456 [id number]   · Noted to be hyperglycemic  · Home insulin regimen resumed  · Titrate insulin as necessary

## 2019-02-20 LAB
ANION GAP SERPL CALCULATED.3IONS-SCNC: 7 MMOL/L (ref 4–13)
BUN SERPL-MCNC: 24 MG/DL (ref 7–25)
CALCIUM SERPL-MCNC: 8.4 MG/DL (ref 8.6–10.5)
CHLORIDE SERPL-SCNC: 115 MMOL/L (ref 98–107)
CO2 SERPL-SCNC: 22 MMOL/L (ref 21–31)
CREAT SERPL-MCNC: 0.89 MG/DL (ref 0.6–1.2)
GFR SERPL CREATININE-BSD FRML MDRD: 62 ML/MIN/1.73SQ M
GLUCOSE SERPL-MCNC: 123 MG/DL (ref 65–140)
GLUCOSE SERPL-MCNC: 125 MG/DL (ref 65–99)
GLUCOSE SERPL-MCNC: 150 MG/DL (ref 65–140)
GLUCOSE SERPL-MCNC: 160 MG/DL (ref 65–140)
GLUCOSE SERPL-MCNC: 251 MG/DL (ref 65–140)
GLUCOSE SERPL-MCNC: 314 MG/DL (ref 65–140)
MAGNESIUM SERPL-MCNC: 1.9 MG/DL (ref 1.9–2.7)
POTASSIUM SERPL-SCNC: 4 MMOL/L (ref 3.5–5.5)
SODIUM SERPL-SCNC: 144 MMOL/L (ref 134–143)

## 2019-02-20 PROCEDURE — 82948 REAGENT STRIP/BLOOD GLUCOSE: CPT

## 2019-02-20 PROCEDURE — 94760 N-INVAS EAR/PLS OXIMETRY 1: CPT

## 2019-02-20 PROCEDURE — 94664 DEMO&/EVAL PT USE INHALER: CPT

## 2019-02-20 PROCEDURE — 99232 SBSQ HOSP IP/OBS MODERATE 35: CPT | Performed by: INTERNAL MEDICINE

## 2019-02-20 PROCEDURE — 83735 ASSAY OF MAGNESIUM: CPT | Performed by: INTERNAL MEDICINE

## 2019-02-20 PROCEDURE — 80048 BASIC METABOLIC PNL TOTAL CA: CPT | Performed by: INTERNAL MEDICINE

## 2019-02-20 PROCEDURE — 92526 ORAL FUNCTION THERAPY: CPT

## 2019-02-20 RX ADMIN — POTASSIUM CHLORIDE AND DEXTROSE MONOHYDRATE 150 ML/HR: 150; 5 INJECTION, SOLUTION INTRAVENOUS at 19:55

## 2019-02-20 RX ADMIN — INSULIN LISPRO 3 UNITS: 100 INJECTION, SOLUTION INTRAVENOUS; SUBCUTANEOUS at 22:00

## 2019-02-20 RX ADMIN — HEPARIN SODIUM 5000 UNITS: 5000 INJECTION INTRAVENOUS; SUBCUTANEOUS at 14:37

## 2019-02-20 RX ADMIN — METOPROLOL TARTRATE 25 MG: 25 TABLET, FILM COATED ORAL at 21:56

## 2019-02-20 RX ADMIN — FERROUS SULFATE TAB 325 MG (65 MG ELEMENTAL FE) 325 MG: 325 (65 FE) TAB at 17:29

## 2019-02-20 RX ADMIN — INSULIN GLARGINE 45 UNITS: 100 INJECTION, SOLUTION SUBCUTANEOUS at 22:00

## 2019-02-20 RX ADMIN — CEFEPIME HYDROCHLORIDE 2000 MG: 2 INJECTION, SOLUTION INTRAVENOUS at 17:29

## 2019-02-20 RX ADMIN — ISOSORBIDE DINITRATE 30 MG: 10 TABLET ORAL at 17:29

## 2019-02-20 RX ADMIN — ANORECTAL OINTMENT: 15.7; .44; 24; 20.6 OINTMENT TOPICAL at 17:36

## 2019-02-20 RX ADMIN — GABAPENTIN 100 MG: 100 CAPSULE ORAL at 17:29

## 2019-02-20 RX ADMIN — FAMOTIDINE 20 MG: 20 TABLET ORAL at 17:29

## 2019-02-20 RX ADMIN — HEPARIN SODIUM 5000 UNITS: 5000 INJECTION INTRAVENOUS; SUBCUTANEOUS at 06:21

## 2019-02-20 RX ADMIN — HEPARIN SODIUM 5000 UNITS: 5000 INJECTION INTRAVENOUS; SUBCUTANEOUS at 21:59

## 2019-02-20 RX ADMIN — POTASSIUM CHLORIDE AND DEXTROSE MONOHYDRATE 150 ML/HR: 150; 5 INJECTION, SOLUTION INTRAVENOUS at 12:16

## 2019-02-20 RX ADMIN — POTASSIUM CHLORIDE AND DEXTROSE MONOHYDRATE 150 ML/HR: 150; 5 INJECTION, SOLUTION INTRAVENOUS at 00:46

## 2019-02-20 RX ADMIN — ANORECTAL OINTMENT: 15.7; .44; 24; 20.6 OINTMENT TOPICAL at 12:14

## 2019-02-20 RX ADMIN — CEFEPIME HYDROCHLORIDE 2000 MG: 2 INJECTION, SOLUTION INTRAVENOUS at 04:01

## 2019-02-20 NOTE — ASSESSMENT & PLAN NOTE
No results found for: HGBA1C    Recent Labs     02/19/19  1744 02/19/19  2152 02/20/19  0013 02/20/19  0644   POCGLU 192* 189* 160* 123       Blood Sugar Average: Last 72 hrs:  (P) 269 7615   · Improved glucose control  · Home insulin regimen resumed  · Titrate insulin as necessary

## 2019-02-20 NOTE — PROGRESS NOTES
Progress Note Zenaida Baumgarten M[birth year]chnick 1940, 66 y o  female MRN: [id number]    Unit/Bed#: -02 Encounter: [id number]    Primary Care Provider: Reva Hunt DO   Date and time admitted to hospital: 2/14/2019  7:25 AM      ADDENDUM:   Stage 2 gluteal ulcer, stage 3 sacral ulcer - both present on admission  Please see treatment plan as outline below    * Bacteremia due to Escherichia coli  Assessment & Plan  · Likely source is urinary  · Appreciate Infectious Disease input  · We will finish out 7 day course of cefepime, which ends today  · Plan for discharge tomorrow    Other dysphagia  Assessment & Plan  · Appreciate speech input  · Resumed pureed diet with honey thick liquids  · It does not appear that patient has adequate p o  Intake for nutrition and hydration  · I relayed my concerns to the patient's daughter over the phone regarding in adequate nutrition and hydration  · I spoke at length with the patient's daughter again regarding decision of PEG tube versus hospice/comfort care  · They again have refused to make a decision one way or the other, and prefer to readdress at a later date, even though I have advised against this, and have urgent a more timely decision  Pneumonia due to infectious organism  Assessment & Plan  · Initially aspiration pneumonia was suspected with history of CVA and dysphagia    · Repeated imaging 2/15 shows atelectasis   · Pneumonia is unlikely as patient does not have cough or sputum production   · Patient was seen by speech therapy, and was cleared to resume her home diet of level 1 puree, honey thick liquids  · Appreciate ID input    Pressure ulcer of left buttock, stage 2  Assessment & Plan  · Present on admission  · Wound care input appreciated   · Wound culture shows E coli and beta hemolytic streptococcus group B  · Appreciate ID input  · Continue cefepime to finish out a 7 day course which ends today  · Continue with local care with frequent turn and reposition    Type 2 diabetes mellitus with complication, without long-term current use of insulin [facility])  Assessment & Plan  No results found for: HGBA1C    Recent Labs     19  1744 19  2152 19  0013 19  0644   POCGLU 192* 189* 160* 123       Blood Sugar Average: Last 72 hrs:  (P) 706 2706   · Improved glucose control  · Home insulin regimen resumed  · Titrate insulin as necessary    COPD with acute exacerbation ([address] )  Assessment & Plan  · This is resolved   · Titrated off of steroids  · Continue treatment stated above  Hypernatremia  Assessment & Plan  · Awaiting labs from this morning  · Appreciate Nephrology input      Acute on chronic respiratory failure with hypoxia (HCC)  Assessment & Plan  · Suspected to be secondary to acute infection  · The patient uses 2 L oxygen chronically at nursing home but has been able to stay on RA with pulse ox in mid to high 90s  · Improving  · Respiratory protocol  · Continue treatment stated above        VTE Pharmacologic Prophylaxis: Pharmacologic: Heparin    Patient Centered Rounds: I have performed bedside rounds with nursing staff today  Discussions with Specialists or Other Care Team Provider: n/a  Education and Discussions with Family / Patient: daughter over the phone    Time Spent for Care: 20 minutes  More than 50% of total time spent on counseling and coordination of care as described above  Current Length of Stay: 6 day(s)    Current Patient Status: Inpatient   Certification Statement: The patient will continue to require additional inpatient hospital stay due to bacteremia    Discharge Plan: likely to snf tomorrow    Code Status: Level 1 - Full Code    Subjective:   Patient seen examined  No acute events were noted    Resting comfortably this morning    Objective:     Vitals:   Temp (24hrs), Av 5 °F (36 4 °C), Min:97 3 °F (36 3 °C), Max:97 8 °F (36 6 °C)    Temp:  [97 3 °F (36 3 °C)-97 8 °F (36 6 °C)] 97 3 °F (36 3 °C)  HR: [60-76] 60  Resp:  [16-20] 20  BP: (132-140)/(60-70) 140/70  SpO2:  [93 %-99 %] 98 %  Body mass index is 27 22 kg/m²  Input and Output Summary (last 24 hours): Intake/Output Summary (Last 24 hours) at 2/20/2019 0950  Last data filed at 2/20/2019 0700  Gross per 24 hour   Intake 120 ml   Output 950 ml   Net -830 ml       Physical Exam:     Physical Exam   Constitutional:   Frail, elderly female, resting comfortably this morning   HENT:   Head: Normocephalic and atraumatic  Eyes: Pupils are equal, round, and reactive to light  Neck: Neck supple  No JVD present  Pulmonary/Chest: Effort normal    Coarse breath sounds noted bilaterally   Abdominal: Soft  Bowel sounds are normal    Musculoskeletal: She exhibits no edema  Neurological:   Awakens to questioning, can answer simple questions   Skin: Skin is warm  No erythema  Psychiatric:   Unable to assess   Nursing note and vitals reviewed  Additional Data:     Labs:    Results from last 7 days   Lab Units 02/19/19  0502  02/15/19  0503 02/14/19  0815   WBC Thousand/uL 12 00*   < > 7 90 12 30*   HEMOGLOBIN g/dL 9 5*   < > 10 3* 11 0*   HEMATOCRIT % 29 6*   < > 31 8* 34 5*   PLATELETS Thousands/uL 214   < > 176 209   NEUTROS PCT %  --   --   --  81*   LYMPHS PCT %  --   --   --  9*   LYMPHO PCT %  --   --  9*  --    MONOS PCT %  --   --   --  9   MONO PCT %  --   --  2*  --    EOS PCT %  --   --   --  1    < > = values in this interval not displayed       Results from last 7 days   Lab Units 02/19/19  0502   POTASSIUM mmol/L 3 1*   CHLORIDE mmol/L 115*   CO2 mmol/L 22   BUN mg/dL 34*   CREATININE mg/dL 1 19   CALCIUM mg/dL 8 3*   ALK PHOS U/L 31*   ALT U/L 11   AST U/L 8*     Results from last 7 days   Lab Units 02/14/19  0815   INR  1 22     Results from last 7 days   Lab Units 02/20/19  0644 02/20/19  0013 02/19/19  2152 02/19/19  1744 02/19/19  1058 02/19/19  7140 02/18/19  2332 02/18/19  2015 02/18/19  1556 02/18/19  1224 02/18/19  3485 02/18/19  0132   POC GLUCOSE mg/dl 123 160* 189* 192* 333* 364* 441* 500* 386* 321* 181* 169*           * I Have Reviewed All Lab Data Listed Above  * Additional Pertinent Lab Tests Reviewed: [facility] Admission  Reviewed    Imaging:  Imaging Reports Reviewed Today Include: n/a    Recent Cultures (last 7 days):     Results from last 7 days   Lab Units 02/18/19  1724 02/17/19  1120 02/14/19  1404 02/14/19  1323 02/14/19  1246 02/14/19  0835 02/14/19  0815   BLOOD CULTURE  No Growth at 24 hrs   No Growth at 48 hrs   --   --   --  Escherichia coli* Escherichia coli*   GRAM STAIN RESULT   --   --  No polys seen*  Rare Gram negative rods*  --   --  Gram negative rods* Gram negative rods*   URINE CULTURE   --   --   --  >100,000 cfu/ml Escherichia coli*  10,000-19,000 cfu/ml Escherichia coli*  >100,000 cfu/ml Escherichia coli*  --   --   --    WOUND CULTURE   --   --  Few Colonies of - STRAIN #1 Escherichia coli*  Few Colonies of - STRAIN #2 Escherichia coli*  1+ Growth of Beta Hemolytic Streptococcus Group B*  --   --   --   --    INFLUENZA B PCR   --   --   --   --   --   --  Not Detected   RSV PCR   --   --   --   --   --   --  Not Detected   LEGIONELLA URINARY ANTIGEN   --   --   --   --  Negative  --   --        Last 24 Hours Medication List:     Current Facility-Administered Medications:  acetaminophen 650 mg Rectal Q4H PRN Eusebio Barger PA-C    acetaminophen 650 mg Oral Q6H PRN Milady Arango MD    albuterol 2 5 mg Nebulization Q4H PRN Milady Arango MD    amLODIPine 10 mg Oral Daily Milady Arango MD    ammonium lactate  Topical BID PRN Milady Arango MD    aspirin 81 mg Oral Daily Milady Arango MD    atorvastatin 10 mg Oral Daily Milady Arango MD    bisacodyl 10 mg Oral Daily PRN Milady Arango MD    cefepime 2,000 mg Intravenous Q12H Milady Arango MD Last Rate: 2,000 mg (02/20/19 0401)   dextrose 5 % with KCl 20 mEq/L 150 mL/hr Intravenous Continuous Brad Varvarelis, DO Last Rate: 150 mL/hr (02/20/19 0046)   famotidine 20 mg Oral BID Timmy Luna PA-C    ferrous sulfate 325 mg Oral TID With Meals Bhavani Abdul MD    gabapentin 100 mg Oral BID Bhavani Abdul MD    heparin (porcine) 5,000 Units Subcutaneous [facility] Bhavani Abdul MD    hydrALAZINE 10 mg Intravenous Q6H PRN Timmy Luna PA-C    HYDROcodone-acetaminophen 1 tablet Oral Q6H PRN Bhavani Abdul MD    insulin glargine 45 Units Subcutaneous HS Bhavani Abdul MD    insulin lispro 1-5 Units Subcutaneous HS Bhavani Abdul MD    insulin lispro 1-6 Units Subcutaneous TID With Meals Timmy Luna PA-C    isosorbide dinitrate 30 mg Oral BID after meals Bhavani Abdul MD    LORazepam 0 5 mg Oral Q8H PRN Bhavani Abdul MD    megestrol 200 mg Oral Daily Bhavani Abdul MD    menthol-zinc oxide  Topical BID Bhavani Abdul MD    metoprolol 5 mg Intravenous Q6H PRN Bhavani Abdul MD    metoprolol tartrate 25 mg Oral Q12H [address] Timmy Luna PA-C    senna 1 tablet Oral Daily Bhavani Abdul MD    sertraline 50 mg Oral Daily Bhavani Abdul MD    tiotropium 18 mcg Inhalation Daily Bhavani Abdul MD         Today, Patient Was Seen By: Bhavani Abdul MD    ** Please Note: Dictation voice to text software may have been used in the creation of this document   **

## 2019-02-20 NOTE — SPEECH THERAPY NOTE
Speech/Language Pathology Progress Note    Patient Name: Kaushik Lance  ODGGO'K Date: 2/20/2019     Pt asleep this morning when SLP arrived for dysphagia tx during breakfast, unable to wake up for PO trials despite stimulation  RN stated pt woke up last evening for some time and was able to tolerate PO of puree and HTL without s/s aspiration  Discussed with RN, continue offering current diet when alert/responsive, and keep NPO when pt is not alert  RN in agreement  Aspiration precautions hung above bed indicating same, as well as full feed assistance and HTL via tsp or small cup sip  Recommendations:  1  Consider hospice/comfort care  2  Consider alternative nutrition/hydration as pt is likely to not meet needs via PO diet  3  May continue with current plan of only feeding when alert, however again this will likely result in limited nutritional intake overall  ST to sign off at this time  Continue puree diet and HTL as tolerated  Please re-consult ST with any new concerns

## 2019-02-20 NOTE — PROGRESS NOTES
Progress Note - Nephrology   Pearl Bejarano 66 y o  female MRN: [id number]  Unit/Bed#: -02 Encounter: [id number]    A/P:  1  Acute kidney injury-resolved  2  Hypernatremia   Follow-up sodium level from this morning, labs are pending  Patient lost her IV access little bit ago, restart IV fluids until morning labs are resulted  3  Hypokalemia   Follow-up potassium level from this morning, further supplementation as indicated  4  Bacteremia due to E coli   Continue antibiotic regimen as indicated by Infectious Disease  5  Dysphagia with possible PEG placement  6  Stage II pressure ulcer left buttock     Follow up reason for today's visit:  Acute kidney injury/electrolyte disorders    Bacteremia due to Escherichia coli    Patient Active Problem List   Diagnosis    Bacteremia due to Escherichia coli    Acute on chronic respiratory failure with hypoxia ([facility])    Pneumonia due to infectious organism    Bronchospasm    Hypernatremia    Renal insufficiency    COPD with acute exacerbation ([facility] [address] )    Type 2 diabetes mellitus with complication, without long-term current use of insulin ([facility])    Pressure ulcer of left buttock, stage 2    Other dysphagia         Subjective:   No acute events overnight  Objective:     Vitals: Blood pressure 140/70, pulse 67, temperature (!) 97 3 °F (36 3 °C), temperature source Tympanic, resp  rate 18, height 5' 8" (1 727 m), weight 81 2 kg (179 lb), SpO2 98 %, not currently breastfeeding  ,Body mass index is 27 22 kg/m²  Weight (last 2 days)     None            Intake/Output Summary (Last 24 hours) at 2/20/2019 0825  Last data filed at 2/20/2019 0700  Gross per 24 hour   Intake 120 ml   Output 950 ml   Net -830 ml     I/O last 3 completed shifts: In: 1120 [P O :120; I V :1000]  Out: 1400 [Urine:1400]    Urethral Catheter Non-latex 16 Fr   (Active)   Amt returned on insertion(mL) 200 mL 2/14/2019 12:15 PM   Site Assessment Clean;Skin intact 2/15/2019  7:50 PM Collection Container Standard drainage bag 2/15/2019  7:50 PM   Securement Method Securing device (Describe) 2/15/2019  7:50 PM   Output (mL) 650 mL 2/17/2019  5:49 AM       Physical Exam: /70 (BP Location: Right arm)   Pulse 67   Temp (!) 97 3 °F (36 3 °C) (Tympanic)   Resp 18   Ht 5' 8" (1 727 m)   Wt 81 2 kg (179 lb)   LMP  (LMP Unknown)   SpO2 98%   Breastfeeding? No   BMI 27 22 kg/m²     General Appearance:    Patient not responding to questions   Head:    Normocephalic, without obvious abnormality, atraumatic   Eyes:    Conjunctiva/corneas clear   Ears:    Normal external ears   Nose:   Nares normal, septum midline, mucosa normal, no drainage    or sinus tenderness   Throat:   Lips, mucosa, and tongue normal; teeth and gums normal   Neck:   Supple   Back:     Symmetric, no curvature, ROM normal, no CVA tenderness   Lungs:     Clear to auscultation bilaterally, respirations unlabored   Chest wall:    No tenderness or deformity   Heart:    Regular rate and rhythm, S1 and S2 normal, no murmur, rub   or gallop   Abdomen:     Soft, non-tender, bowel sounds active   Extremities:   Extremities normal, atraumatic, no cyanosis or edema   Skin:   Skin color, texture, turgor normal, no rashes or lesions   Lymph nodes:   Cervical normal   Neurologic:   CNII-XII intact            Lab, Imaging and other studies: I have personally reviewed pertinent labs  CBC:   No results found for: WBC, HGB, HCT, MCV, PLT, ADJUSTEDWBC, MCH, MCHC, RDW, MPV, NRBC  CMP:   No results found for: NA, K, CL, CO2, ANIONGAP, BUN, CREATININE, GLUCOSE, CALCIUM, AST, ALT, ALKPHOS, PROT, BILITOT, EGFR        Results from last 7 days   Lab Units 02/19/19  0502 02/18/19  0459 02/17/19  0434   POTASSIUM mmol/L 3 1* 3 0* 3 2*   CHLORIDE mmol/L 115* 116* 113*   CO2 mmol/L 22 23 24   BUN mg/dL 34* 30* 33*   CREATININE mg/dL 1 19 0 95 0 83   CALCIUM mg/dL 8 3* 8 7 8 8   ALK PHOS U/L 31* 36* 37*   ALT U/L 11 12 9   AST U/L 8* 13 8* Phosphorus: No results found for: PHOS  Magnesium: No results found for: MG  Urinalysis: No results found for: Wilfred Climes, SPECGRAV, PHUR, LEUKOCYTESUR, NITRITE, PROTEINUA, GLUCOSEU, KETONESU, BILIRUBINUR, BLOODU  Ionized Calcium: No results found for: CAION  Coagulation: No results found for: PT, INR, APTT  Troponin: No results found for: TROPONINI  ABG: No results found for: PHART, UYV8RVN, PO2ART, MVC2BLW, E9LFJALZ, BEART, SOURCE  Radiology review:     IMAGING  No results found      Current Facility-Administered Medications   Medication Dose Route Frequency    acetaminophen (TYLENOL) rectal suppository 650 mg  650 mg Rectal Q4H PRN    acetaminophen (TYLENOL) tablet 650 mg  650 mg Oral Q6H PRN    albuterol inhalation solution 2 5 mg  2 5 mg Nebulization Q4H PRN    amLODIPine (NORVASC) tablet 10 mg  10 mg Oral Daily    ammonium lactate (LAC-HYDRIN) 12 % lotion   Topical BID PRN    aspirin (ECOTRIN LOW STRENGTH) EC tablet 81 mg  81 mg Oral Daily    atorvastatin (LIPITOR) tablet 10 mg  10 mg Oral Daily    bisacodyl (DULCOLAX) EC tablet 10 mg  10 mg Oral Daily PRN    cefepime (MAXIPIME) IVPB (premix) 2,000 mg  2,000 mg Intravenous Q12H    dextrose 5 % with KCl 20 mEq/L infusion (premix)  150 mL/hr Intravenous Continuous    famotidine (PEPCID) tablet 20 mg  20 mg Oral BID    ferrous sulfate tablet 325 mg  325 mg Oral TID With Meals    gabapentin (NEURONTIN) capsule 100 mg  100 mg Oral BID    heparin (porcine) subcutaneous injection 5,000 Units  5,000 Units Subcutaneous Q8H [address]    hydrALAZINE (APRESOLINE) injection 10 mg  10 mg Intravenous Q6H PRN    HYDROcodone-acetaminophen (NORCO) 5-325 mg per tablet 1 tablet  1 tablet Oral Q6H PRN    insulin glargine (LANTUS) subcutaneous injection 45 Units 0 45 mL  45 Units Subcutaneous HS    insulin lispro (HumaLOG) 100 units/mL subcutaneous injection 1-5 Units  1-5 Units Subcutaneous HS    insulin lispro (HumaLOG) 100 units/mL subcutaneous injection 1-6 Units  1-6 Units Subcutaneous TID With Meals    insulin lispro (HumaLOG) 100 units/mL subcutaneous injection 8 Units  8 Units Subcutaneous TID With Meals    isosorbide dinitrate (ISORDIL) tablet 30 mg  30 mg Oral BID after meals    LORazepam (ATIVAN) tablet 0 5 mg  0 5 mg Oral Q8H PRN    megestrol (MEGACE) oral suspension 200 mg  200 mg Oral Daily    menthol-zinc oxide (CALMOSEPTINE) 0 44-20 6 % ointment   Topical BID    metoprolol (LOPRESSOR) injection 5 mg  5 mg Intravenous Q6H PRN    metoprolol tartrate (LOPRESSOR) tablet 25 mg  25 mg Oral Q12H [address]    senna (SENOKOT) tablet 8 6 mg  1 tablet Oral Daily    sertraline (ZOLOFT) tablet 50 mg  50 mg Oral Daily    tiotropium (SPIRIVA) capsule for inhaler 18 mcg  18 mcg Inhalation Daily     Medications Discontinued During This Encounter   Medication Reason    ammonium lactate (LAC-HYDRIN) 12 % cream Formulary change    vancomycin (VANCOCIN) 1,250 mg in sodium chloride 0 9 % 250 mL IVPB Dose adjustment    albuterol inhalation solution 2 5 mg     insulin glargine (LANTUS) subcutaneous injection 46 Units 0 46 mL     insulin lispro (HumaLOG) 100 units/mL subcutaneous injection 1-6 Units     sodium chloride 0 9 % infusion     dextrose 5 % and sodium chloride 0 33 % infusion     piperacillin-tazobactam (ZOSYN) 2 25 g in sodium chloride 0 9 % 50 mL IVPB     vancomycin (VANCOCIN) 750 mg in sodium chloride 0 9 % 250 mL IVPB     methylPREDNISolone sodium succinate (Solu-MEDROL) injection 40 mg     metoprolol tartrate (LOPRESSOR) tablet 25 mg     insulin glargine (LANTUS) subcutaneous injection 10 Units 0 1 mL     acetaminophen (TYLENOL) rectal suppository 325 mg     morphine injection 1 mg     polyethylene glycol (GLYCOLAX) bowel prep 17 g Formulary change    hydrALAZINE (APRESOLINE) injection 10 mg     insulin glargine (LANTUS) subcutaneous injection 20 Units 0 2 mL     famotidine (PEPCID) tablet 20 mg     methylPREDNISolone sodium succinate (Solu-MEDROL) injection 40 mg     cefepime (MAXIPIME) IVPB (premix) 2,000 mg     metroNIDAZOLE (FLAGYL) IVPB (premix) 500 mg     morphine injection 1 mg     potassium chloride (K-DUR,KLOR-CON) CR tablet 20 mEq     dextrose 5 % and sodium chloride 0 45 % infusion     methylPREDNISolone sodium succinate (Solu-MEDROL) injection 40 mg     piperacillin-tazobactam (ZOSYN) 3 375 g in sodium chloride 0 9 % 50 mL IVPB     metoprolol (LOPRESSOR) injection 5 mg     famotidine (PEPCID) injection 20 mg     hydrALAZINE (APRESOLINE) injection 10 mg     polyethylene glycol (MIRALAX) packet 17 g     insulin glargine (LANTUS) subcutaneous injection 30 Units 0 3 mL     predniSONE tablet 20 mg     predniSONE tablet 10 mg     insulin lispro (HumaLOG) 100 units/mL subcutaneous injection 1-6 Units        Debi Trinh, DO

## 2019-02-20 NOTE — PLAN OF CARE
Problem: Prexisting or High Potential for Compromised Skin Integrity  Goal: Skin integrity is maintained or improved  Description  INTERVENTIONS:  - Identify patients at risk for skin breakdown  - Assess and monitor skin integrity  - Assess and monitor nutrition and hydration status  - Monitor labs (i e  albumin)  - Assess for incontinence   - Turn and reposition patient  - Assist with mobility/ambulation  - Relieve pressure over bony prominences  - Avoid friction and shearing  - Provide appropriate hygiene as needed including keeping skin clean and dry  - Evaluate need for skin moisturizer/barrier cream  - Collaborate with interdisciplinary team (i e  Nutrition, Rehabilitation, etc )   - Patient/family teaching  Outcome: Progressing     Problem: Nutrition/Hydration-ADULT  Goal: Nutrient/Hydration intake appropriate for improving, restoring or maintaining nutritional needs  Description  Monitor and assess patient's nutrition/hydration status for malnutrition (ex- brittle hair, bruises, dry skin, pale skin and conjunctiva, muscle wasting, smooth red tongue, and disorientation)  Collaborate with interdisciplinary team and initiate plan and interventions as ordered  Monitor patient's weight and dietary intake as ordered or per policy  Utilize nutrition screening tool and intervene per policy  Determine patient's food preferences and provide high-protein, high-caloric foods as appropriate       INTERVENTIONS:  - Monitor oral intake, urinary output, labs, and treatment plans  - Assess nutrition and hydration status and recommend course of action  - Evaluate amount of meals eaten  - Assist patient with eating if necessary   - Allow adequate time for meals  - Recommend/ encourage appropriate diets, oral nutritional supplements, and vitamin/mineral supplements  - Order, calculate, and assess calorie counts as needed  - Recommend, monitor, and adjust tube feedings and TPN/PPN based on assessed needs  - Assess need for intravenous fluids  - Provide specific nutrition/hydration education as appropriate  - Include patient/family/caregiver in decisions related to nutrition    she has recently resumed her pureed meal plan with honey thick liquids  RDN to check her po intake  She has pressure injury areas noted  RDN to offer honey shakes at breakfast and lunch and check her tolerance  She is on pepcid and lipitor  Her BG was 164 H and albumin 3 0 L on 2-18  No new weight RDN to check her weight status  RDN to reassess her nutrition needs at high risk and follow PRN   Outcome: Progressing     Problem: Potential for Falls  Goal: Patient will remain free of falls  Description  INTERVENTIONS:  - Assess patient frequently for physical needs  -  Identify cognitive and physical deficits and behaviors that affect risk of falls    -  [place] fall precautions as indicated by assessment   - Educate patient/family on patient safety including physical limitations  - Instruct patient to call for assistance with activity based on assessment  - Modify environment to reduce risk of injury  - Consider OT/PT consult to assist with strengthening/mobility  Outcome: Progressing

## 2019-02-20 NOTE — ASSESSMENT & PLAN NOTE
· Stage 2 gluteal ulcer, stage 3 sacral ulcer - both present on admission  · Wound care input appreciated   · Wound culture shows E coli and beta hemolytic streptococcus group B  · Appreciate ID input  · Continue cefepime to finish out a 7 day course which has finished  · Continue with local care with frequent turn and reposition

## 2019-02-20 NOTE — ASSESSMENT & PLAN NOTE
· Likely source is urinary  · Appreciate Infectious Disease input  · We will finish out 7 day course of cefepime, which ends today  · Plan for discharge tomorrow

## 2019-02-20 NOTE — ASSESSMENT & PLAN NOTE
· Appreciate speech input  · Resumed pureed diet with honey thick liquids  · It does not appear that patient has adequate p o  Intake for nutrition and hydration  · I relayed my concerns to the patient's daughter over the phone regarding in adequate nutrition and hydration  · I spoke at length with the patient's daughter again regarding decision of PEG tube versus hospice/comfort care  · They again have refused to make a decision one way or the other, and prefer to readdress at a later date, even though I have advised against this, and have urgent a more timely decision

## 2019-02-20 NOTE — ASSESSMENT & PLAN NOTE
· Present on admission  · Wound care input appreciated   · Wound culture shows E coli and beta hemolytic streptococcus group B  · Appreciate ID input  · Continue cefepime to finish out a 7 day course which ends today  · Continue with local care with frequent turn and reposition

## 2019-02-21 ENCOUNTER — EPISODE CHANGES (OUTPATIENT)
Dept: CASE MANAGEMENT | Facility: HOSPITAL | Age: 79
End: 2019-02-21

## 2019-02-21 VITALS
SYSTOLIC BLOOD PRESSURE: 152 MMHG | HEIGHT: 68 IN | RESPIRATION RATE: 18 BRPM | DIASTOLIC BLOOD PRESSURE: 64 MMHG | HEART RATE: 60 BPM | BODY MASS INDEX: 27.13 KG/M2 | OXYGEN SATURATION: 97 % | TEMPERATURE: 96.3 F | WEIGHT: 179 LBS

## 2019-02-21 PROBLEM — J96.21 ACUTE ON CHRONIC RESPIRATORY FAILURE WITH HYPOXIA (HCC): Status: RESOLVED | Noted: 2019-02-14 | Resolved: 2019-02-21

## 2019-02-21 PROBLEM — B96.20 BACTEREMIA DUE TO ESCHERICHIA COLI: Status: RESOLVED | Noted: 2019-02-14 | Resolved: 2019-02-21

## 2019-02-21 PROBLEM — J44.1 COPD WITH ACUTE EXACERBATION (HCC): Status: RESOLVED | Noted: 2019-02-14 | Resolved: 2019-02-21

## 2019-02-21 PROBLEM — E87.0 HYPERNATREMIA: Status: RESOLVED | Noted: 2019-02-14 | Resolved: 2019-02-21

## 2019-02-21 PROBLEM — J18.9 PNEUMONIA DUE TO INFECTIOUS ORGANISM: Status: RESOLVED | Noted: 2019-02-14 | Resolved: 2019-02-21

## 2019-02-21 PROBLEM — R78.81 BACTEREMIA DUE TO ESCHERICHIA COLI: Status: RESOLVED | Noted: 2019-02-14 | Resolved: 2019-02-21

## 2019-02-21 LAB
ANION GAP SERPL CALCULATED.3IONS-SCNC: 3 MMOL/L (ref 4–13)
BUN SERPL-MCNC: 18 MG/DL (ref 7–25)
CALCIUM SERPL-MCNC: 8.4 MG/DL (ref 8.6–10.5)
CHLORIDE SERPL-SCNC: 112 MMOL/L (ref 98–107)
CO2 SERPL-SCNC: 23 MMOL/L (ref 21–31)
CREAT SERPL-MCNC: 0.87 MG/DL (ref 0.6–1.2)
GFR SERPL CREATININE-BSD FRML MDRD: 64 ML/MIN/1.73SQ M
GLUCOSE SERPL-MCNC: 245 MG/DL (ref 65–140)
GLUCOSE SERPL-MCNC: 245 MG/DL (ref 65–99)
GLUCOSE SERPL-MCNC: 363 MG/DL (ref 65–140)
MAGNESIUM SERPL-MCNC: 1.8 MG/DL (ref 1.9–2.7)
POTASSIUM SERPL-SCNC: 4.8 MMOL/L (ref 3.5–5.5)
SODIUM SERPL-SCNC: 138 MMOL/L (ref 134–143)

## 2019-02-21 PROCEDURE — 94664 DEMO&/EVAL PT USE INHALER: CPT

## 2019-02-21 PROCEDURE — 82948 REAGENT STRIP/BLOOD GLUCOSE: CPT

## 2019-02-21 PROCEDURE — 80048 BASIC METABOLIC PNL TOTAL CA: CPT | Performed by: INTERNAL MEDICINE

## 2019-02-21 PROCEDURE — 83735 ASSAY OF MAGNESIUM: CPT | Performed by: INTERNAL MEDICINE

## 2019-02-21 PROCEDURE — 94760 N-INVAS EAR/PLS OXIMETRY 1: CPT

## 2019-02-21 PROCEDURE — 99239 HOSP IP/OBS DSCHRG MGMT >30: CPT | Performed by: INTERNAL MEDICINE

## 2019-02-21 RX ADMIN — ANORECTAL OINTMENT: 15.7; .44; 24; 20.6 OINTMENT TOPICAL at 09:30

## 2019-02-21 RX ADMIN — ASPIRIN 81 MG: 81 TABLET, COATED ORAL at 09:24

## 2019-02-21 RX ADMIN — MEGESTROL ACETATE 200 MG: 40 SUSPENSION ORAL at 09:30

## 2019-02-21 RX ADMIN — GABAPENTIN 100 MG: 100 CAPSULE ORAL at 09:24

## 2019-02-21 RX ADMIN — FAMOTIDINE 20 MG: 20 TABLET ORAL at 09:24

## 2019-02-21 RX ADMIN — SERTRALINE HYDROCHLORIDE 50 MG: 25 TABLET ORAL at 09:24

## 2019-02-21 RX ADMIN — CEFEPIME HYDROCHLORIDE 2000 MG: 2 INJECTION, SOLUTION INTRAVENOUS at 04:11

## 2019-02-21 RX ADMIN — STANDARDIZED SENNA CONCENTRATE 8.6 MG: 8.6 TABLET ORAL at 09:24

## 2019-02-21 RX ADMIN — TIOTROPIUM BROMIDE 18 MCG: 18 CAPSULE ORAL; RESPIRATORY (INHALATION) at 09:30

## 2019-02-21 RX ADMIN — METOPROLOL TARTRATE 25 MG: 25 TABLET, FILM COATED ORAL at 09:24

## 2019-02-21 RX ADMIN — INSULIN LISPRO 5 UNITS: 100 INJECTION, SOLUTION INTRAVENOUS; SUBCUTANEOUS at 11:11

## 2019-02-21 RX ADMIN — ISOSORBIDE DINITRATE 30 MG: 10 TABLET ORAL at 09:24

## 2019-02-21 RX ADMIN — FERROUS SULFATE TAB 325 MG (65 MG ELEMENTAL FE) 325 MG: 325 (65 FE) TAB at 11:12

## 2019-02-21 RX ADMIN — POTASSIUM CHLORIDE AND DEXTROSE MONOHYDRATE 150 ML/HR: 150; 5 INJECTION, SOLUTION INTRAVENOUS at 04:11

## 2019-02-21 RX ADMIN — ATORVASTATIN CALCIUM 10 MG: 10 TABLET, FILM COATED ORAL at 09:24

## 2019-02-21 RX ADMIN — FERROUS SULFATE TAB 325 MG (65 MG ELEMENTAL FE) 325 MG: 325 (65 FE) TAB at 09:24

## 2019-02-21 RX ADMIN — HEPARIN SODIUM 5000 UNITS: 5000 INJECTION INTRAVENOUS; SUBCUTANEOUS at 06:30

## 2019-02-21 RX ADMIN — INSULIN LISPRO 5 UNITS: 100 INJECTION, SOLUTION INTRAVENOUS; SUBCUTANEOUS at 09:29

## 2019-02-21 RX ADMIN — AMLODIPINE BESYLATE 10 MG: 5 TABLET ORAL at 09:28

## 2019-02-21 NOTE — ASSESSMENT & PLAN NOTE
· Likely source is urinary  · Appreciate Infectious Disease input  · Finished a course of cefepime  · Plan for discharge today

## 2019-02-21 NOTE — PLAN OF CARE
Problem: Prexisting or High Potential for Compromised Skin Integrity  Goal: Skin integrity is maintained or improved  Description  INTERVENTIONS:  - Identify patients at risk for skin breakdown  - Assess and monitor skin integrity  - Assess and monitor nutrition and hydration status  - Monitor labs (i e  albumin)  - Assess for incontinence   - Turn and reposition patient  - Assist with mobility/ambulation  - Relieve pressure over bony prominences  - Avoid friction and shearing  - Provide appropriate hygiene as needed including keeping skin clean and dry  - Evaluate need for skin moisturizer/barrier cream  - Collaborate with interdisciplinary team (i e  Nutrition, Rehabilitation, etc )   - Patient/family teaching  Outcome: Progressing     Problem: Nutrition/Hydration-ADULT  Goal: Nutrient/Hydration intake appropriate for improving, restoring or maintaining nutritional needs  Description  Monitor and assess patient's nutrition/hydration status for malnutrition (ex- brittle hair, bruises, dry skin, pale skin and conjunctiva, muscle wasting, smooth red tongue, and disorientation)  Collaborate with interdisciplinary team and initiate plan and interventions as ordered  Monitor patient's weight and dietary intake as ordered or per policy  Utilize nutrition screening tool and intervene per policy  Determine patient's food preferences and provide high-protein, high-caloric foods as appropriate       INTERVENTIONS:  - Monitor oral intake, urinary output, labs, and treatment plans  - Assess nutrition and hydration status and recommend course of action  - Evaluate amount of meals eaten  - Assist patient with eating if necessary   - Allow adequate time for meals  - Recommend/ encourage appropriate diets, oral nutritional supplements, and vitamin/mineral supplements  - Order, calculate, and assess calorie counts as needed  - Recommend, monitor, and adjust tube feedings and TPN/PPN based on assessed needs  - Assess need for intravenous fluids  - Provide specific nutrition/hydration education as appropriate  - Include patient/family/caregiver in decisions related to nutrition    she has recently resumed her pureed meal plan with honey thick liquids  RDN to check her po intake  She has pressure injury areas noted  RDN to offer honey shakes at breakfast and lunch and check her tolerance  She is on pepcid and lipitor  Her BG was 164 H and albumin 3 0 L on 2-18  No new weight RDN to check her weight status  RDN to reassess her nutrition needs at high risk and follow PRN   Outcome: Progressing     Problem: Potential for Falls  Goal: Patient will remain free of falls  Description  INTERVENTIONS:  - Assess patient frequently for physical needs  -  Identify cognitive and physical deficits and behaviors that affect risk of falls    -  [place] fall precautions as indicated by assessment   - Educate patient/family on patient safety including physical limitations  - Instruct patient to call for assistance with activity based on assessment  - Modify environment to reduce risk of injury  - Consider OT/PT consult to assist with strengthening/mobility  Outcome: Progressing

## 2019-02-21 NOTE — PROGRESS NOTES
Progress Note - Nephrology   Bj Davian Bejarano 66 y o  female MRN: [id number]  Unit/Bed#: -02 Encounter: [id number]    A/P:  1  Acute kidney injury-resolved  2  Hypernatremia   Sodium level back to baseline, will decrease the patient's IV fluids from 150 mL/hour down to 100 mL/hour for continued hydration  Once again, patient is at very high risk of dehydration given her inability to drink and eat properly  3  Hypokalemia   Potassium Level appropriate, continue D5W with 20 mEq per L of potassium chloride  Continue monitor labs closely at this time  4  Bacteremia due to E coli   Continue antibiotic regimen as indicated by Infectious Disease  5  Dysphagia with possible PEG placement  6  Stage II pressure ulcer left buttock     Follow up reason for today's visit:  Acute kidney injury/electrolyte disorders    Bacteremia due to Escherichia coli    Patient Active Problem List   Diagnosis    Bronchospasm    Renal insufficiency    Type 2 diabetes mellitus with complication, without long-term current use of insulin ([address] )    Pressure ulcer of left buttock, stage 2    Other dysphagia         Subjective:   No acute events overnight  Objective:     Vitals: Blood pressure 152/64, pulse 60, temperature (!) 96 3 °F (35 7 °C), temperature source Temporal, resp  rate 18, height 5' 8" (1 727 m), weight 81 2 kg (179 lb), SpO2 97 %, not currently breastfeeding  ,Body mass index is 27 22 kg/m²  Weight (last 2 days)     None            Intake/Output Summary (Last 24 hours) at 2/21/2019 1331  Last data filed at 2/20/2019 2253  Gross per 24 hour   Intake    Output 1350 ml   Net -1350 ml     I/O last 3 completed shifts:  In: -   Out: 1950 [Urine:1950]    Urethral Catheter Non-latex 16 Fr   (Active)   Amt returned on insertion(mL) 200 mL 2/14/2019 12:15 PM   Site Assessment Clean;Skin intact 2/15/2019  7:50 PM   Collection Container Standard drainage bag 2/15/2019  7:50 PM   Securement Method Securing device (Describe) 2/15/2019  7:50 PM   Output (mL) 650 mL 2/17/2019  5:49 AM       Physical Exam: /64 (BP Location: Left arm)   Pulse 60   Temp (!) 96 3 °F (35 7 °C) (Temporal)   Resp 18   Ht 5' 8" (1 727 m)   Wt 81 2 kg (179 lb)   LMP  (LMP Unknown)   SpO2 97%   Breastfeeding? No   BMI 27 22 kg/m²     General Appearance:    Patient not responding to questions   Head:    Normocephalic, without obvious abnormality, atraumatic   Eyes:    Conjunctiva/corneas clear   Ears:    Normal external ears   Nose:   Nares normal, septum midline, mucosa normal, no drainage    or sinus tenderness   Throat:   Lips, mucosa, and tongue normal; teeth and gums normal   Neck:   Supple   Back:     Symmetric, no curvature, ROM normal, no CVA tenderness   Lungs:     Clear to auscultation bilaterally, respirations unlabored   Chest wall:    No tenderness or deformity   Heart:    Regular rate and rhythm, S1 and S2 normal, no murmur, rub   or gallop   Abdomen:     Soft, non-tender, bowel sounds active   Extremities:   Extremities normal, atraumatic, no cyanosis or edema   Skin:   Skin color, texture, turgor normal, no rashes or lesions   Lymph nodes:   Cervical normal   Neurologic:   CNII-XII intact            Lab, Imaging and other studies: I have personally reviewed pertinent labs  CBC:   No results found for: WBC, HGB, HCT, MCV, PLT, ADJUSTEDWBC, MCH, MCHC, RDW, MPV, NRBC  CMP:   Lab Results   Component Value Date    K 4 8 02/21/2019     (H) 02/21/2019    CO2 23 02/21/2019    BUN 18 02/21/2019    CREATININE 0 87 02/21/2019    CALCIUM 8 4 (L) 02/21/2019    EGFR 64 02/21/2019           Results from last 7 days   Lab Units 02/21/19  0631 02/20/19  0916 02/19/19  0502 02/18/19  0459 02/17/19  0434   POTASSIUM mmol/L 4 8 4 0 3 1* 3 0* 3 2*   CHLORIDE mmol/L 112* 115* 115* 116* 113*   CO2 mmol/L 23 22 22 23 24   BUN mg/dL 18 24 34* 30* 33*   CREATININE mg/dL 0 87 0 89 1 19 0 95 0 83   CALCIUM mg/dL 8 4* 8 4* 8 3* 8 7 8 8   ALK PHOS U/L  -- --  31* 36* 37*   ALT U/L  --   --  11 12 9   AST U/L  --   --  8* 13 8*         Phosphorus: No results found for: PHOS  Magnesium:   Lab Results   Component Value Date    MG 1 8 (L) 02/21/2019     Urinalysis: No results found for: Blanca Gil, SPECGRAV, PHUR, LEUKOCYTESUR, NITRITE, PROTEINUA, GLUCOSEU, KETONESU, BILIRUBINUR, BLOODU  Ionized Calcium: No results found for: CAION  Coagulation: No results found for: PT, INR, APTT  Troponin: No results found for: TROPONINI  ABG: No results found for: PHART, LIZ1DOY, PO2ART, IOV3KMZ, U0VTSFGS, BEART, SOURCE  Radiology review:     IMAGING  No results found      Current Facility-Administered Medications   Medication Dose Route Frequency    acetaminophen (TYLENOL) rectal suppository 650 mg  650 mg Rectal Q4H PRN    acetaminophen (TYLENOL) tablet 650 mg  650 mg Oral Q6H PRN    albuterol inhalation solution 2 5 mg  2 5 mg Nebulization Q4H PRN    amLODIPine (NORVASC) tablet 10 mg  10 mg Oral Daily    ammonium lactate (LAC-HYDRIN) 12 % lotion   Topical BID PRN    aspirin (ECOTRIN LOW STRENGTH) EC tablet 81 mg  81 mg Oral Daily    atorvastatin (LIPITOR) tablet 10 mg  10 mg Oral Daily    bisacodyl (DULCOLAX) EC tablet 10 mg  10 mg Oral Daily PRN    dextrose 5 % with KCl 20 mEq/L infusion (premix)  100 mL/hr Intravenous Continuous    famotidine (PEPCID) tablet 20 mg  20 mg Oral BID    ferrous sulfate tablet 325 mg  325 mg Oral TID With Meals    gabapentin (NEURONTIN) capsule 100 mg  100 mg Oral BID    heparin (porcine) subcutaneous injection 5,000 Units  5,000 Units Subcutaneous Q8H [facility]    hydrALAZINE (APRESOLINE) injection 10 mg  10 mg Intravenous Q6H PRN    HYDROcodone-acetaminophen (NORCO) 5-325 mg per tablet 1 tablet  1 tablet Oral Q6H PRN    insulin glargine (LANTUS) subcutaneous injection 45 Units 0 45 mL  45 Units Subcutaneous HS    insulin lispro (HumaLOG) 100 units/mL subcutaneous injection 1-5 Units  1-5 Units Subcutaneous HS    insulin lispro (HumaLOG) 100 units/mL subcutaneous injection 1-6 Units  1-6 Units Subcutaneous TID With Meals    insulin lispro (HumaLOG) 100 units/mL subcutaneous injection 5 Units  5 Units Subcutaneous TID With Meals    isosorbide dinitrate (ISORDIL) tablet 30 mg  30 mg Oral BID after meals    LORazepam (ATIVAN) tablet 0 5 mg  0 5 mg Oral Q8H PRN    megestrol (MEGACE) oral suspension 200 mg  200 mg Oral Daily    menthol-zinc oxide (CALMOSEPTINE) 0 44-20 6 % ointment   Topical BID    metoprolol (LOPRESSOR) injection 5 mg  5 mg Intravenous Q6H PRN    metoprolol tartrate (LOPRESSOR) tablet 25 mg  25 mg Oral Q12H [address]    senna (SENOKOT) tablet 8 6 mg  1 tablet Oral Daily    sertraline (ZOLOFT) tablet 50 mg  50 mg Oral Daily    tiotropium (SPIRIVA) capsule for inhaler 18 mcg  18 mcg Inhalation Daily     Medications Discontinued During This Encounter   Medication Reason    ammonium lactate (LAC-HYDRIN) 12 % cream Formulary change    vancomycin (VANCOCIN) 1,250 mg in sodium chloride 0 9 % 250 mL IVPB Dose adjustment    albuterol inhalation solution 2 5 mg     insulin glargine (LANTUS) subcutaneous injection 46 Units 0 46 mL     insulin lispro (HumaLOG) 100 units/mL subcutaneous injection 1-6 Units     sodium chloride 0 9 % infusion     dextrose 5 % and sodium chloride 0 33 % infusion     piperacillin-tazobactam (ZOSYN) 2 25 g in sodium chloride 0 9 % 50 mL IVPB     vancomycin (VANCOCIN) 750 mg in sodium chloride 0 9 % 250 mL IVPB     methylPREDNISolone sodium succinate (Solu-MEDROL) injection 40 mg     metoprolol tartrate (LOPRESSOR) tablet 25 mg     insulin glargine (LANTUS) subcutaneous injection 10 Units 0 1 mL     acetaminophen (TYLENOL) rectal suppository 325 mg     morphine injection 1 mg     polyethylene glycol (GLYCOLAX) bowel prep 17 g Formulary change    hydrALAZINE (APRESOLINE) injection 10 mg     insulin glargine (LANTUS) subcutaneous injection 20 Units 0 2 mL     famotidine (PEPCID) tablet 20 mg  methylPREDNISolone sodium succinate (Solu-MEDROL) injection 40 mg     cefepime (MAXIPIME) IVPB (premix) 2,000 mg     metroNIDAZOLE (FLAGYL) IVPB (premix) 500 mg     morphine injection 1 mg     potassium chloride (K-DUR,KLOR-CON) CR tablet 20 mEq     dextrose 5 % and sodium chloride 0 45 % infusion     methylPREDNISolone sodium succinate (Solu-MEDROL) injection 40 mg     piperacillin-tazobactam (ZOSYN) 3 375 g in sodium chloride 0 9 % 50 mL IVPB     metoprolol (LOPRESSOR) injection 5 mg     famotidine (PEPCID) injection 20 mg     hydrALAZINE (APRESOLINE) injection 10 mg     polyethylene glycol (MIRALAX) packet 17 g     insulin glargine (LANTUS) subcutaneous injection 30 Units 0 3 mL     predniSONE tablet 20 mg     predniSONE tablet 10 mg     insulin lispro (HumaLOG) 100 units/mL subcutaneous injection 1-6 Units     insulin lispro (HumaLOG) 100 units/mL subcutaneous injection 8 Units     cefepime (MAXIPIME) IVPB (premix) 2,000 mg        [facility]

## 2019-02-21 NOTE — ASSESSMENT & PLAN NOTE
No results found for: HGBA1C    Recent Labs     02/20/19  1721 02/20/19  2038 02/21/19  0617 02/21/19  1040   POCGLU 251* 314* 245* 363*       Blood Sugar Average: Last 72 hrs:  (P) 275 [id number]   · Resume home insulin  · Sliding scale  · lantus

## 2019-02-21 NOTE — PLAN OF CARE
Problem: Prexisting or High Potential for Compromised Skin Integrity  Goal: Skin integrity is maintained or improved  Description  INTERVENTIONS:  - Identify patients at risk for skin breakdown  - Assess and monitor skin integrity  - Assess and monitor nutrition and hydration status  - Monitor labs (i e  albumin)  - Assess for incontinence   - Turn and reposition patient  - Assist with mobility/ambulation  - Relieve pressure over bony prominences  - Avoid friction and shearing  - Provide appropriate hygiene as needed including keeping skin clean and dry  - Evaluate need for skin moisturizer/barrier cream  - Collaborate with interdisciplinary team (i e  Nutrition, Rehabilitation, etc )   - Patient/family teaching  Outcome: Progressing     Problem: Nutrition/Hydration-ADULT  Goal: Nutrient/Hydration intake appropriate for improving, restoring or maintaining nutritional needs  Description  Monitor and assess patient's nutrition/hydration status for malnutrition (ex- brittle hair, bruises, dry skin, pale skin and conjunctiva, muscle wasting, smooth red tongue, and disorientation)  Collaborate with interdisciplinary team and initiate plan and interventions as ordered  Monitor patient's weight and dietary intake as ordered or per policy  Utilize nutrition screening tool and intervene per policy  Determine patient's food preferences and provide high-protein, high-caloric foods as appropriate       INTERVENTIONS:  - Monitor oral intake, urinary output, labs, and treatment plans  - Assess nutrition and hydration status and recommend course of action  - Evaluate amount of meals eaten  - Assist patient with eating if necessary   - Allow adequate time for meals  - Recommend/ encourage appropriate diets, oral nutritional supplements, and vitamin/mineral supplements  - Order, calculate, and assess calorie counts as needed  - Recommend, monitor, and adjust tube feedings and TPN/PPN based on assessed needs  - Assess need for intravenous fluids  - Provide specific nutrition/hydration education as appropriate  - Include patient/family/caregiver in decisions related to nutrition    she has recently resumed her pureed meal plan with honey thick liquids  RDN to check her po intake  She has pressure injury areas noted  RDN to offer honey shakes at breakfast and lunch and check her tolerance  She is on pepcid and lipitor  Her BG was 164 H and albumin 3 0 L on 2-18  No new weight RDN to check her weight status  RDN to reassess her nutrition needs at high risk and follow PRN   Outcome: Progressing     Problem: Potential for Falls  Goal: Patient will remain free of falls  Description  INTERVENTIONS:  - Assess patient frequently for physical needs  -  Identify cognitive and physical deficits and behaviors that affect risk of falls    -  [place] fall precautions as indicated by assessment   - Educate patient/family on patient safety including physical limitations  - Instruct patient to call for assistance with activity based on assessment  - Modify environment to reduce risk of injury  - Consider OT/PT consult to assist with strengthening/mobility  Outcome: Progressing

## 2019-02-21 NOTE — DISCHARGE SUMMARY
Discharge- Manasa Velasquez 1940, 66 y o  female MRN: [id number]    Unit/Bed#: -02 Encounter: [id number]    Primary Care Provider: Francois Graham DO   Date and time admitted to hospital: 2/14/2019  7:25 AM        * Bacteremia due to Escherichia coliresolved as of 2/21/2019  Assessment & Plan  · Likely source is urinary  · Appreciate Infectious Disease input  · Finished a course of cefepime  · Plan for discharge today    Other dysphagia  Assessment & Plan  · Appreciate speech input  · Resumed pureed diet with honey thick liquids  · It does not appear that patient has adequate p o  Intake for nutrition and hydration  · I relayed my concerns to the patient's daughter over the phone regarding in adequate nutrition and hydration  · I spoke at length with the patient's daughter again regarding decision of PEG tube versus hospice/comfort care  · They again have refused to make a decision one way or the other, and prefer to readdress at a later date, even though I have advised against this, and have urged a more timely decision  Pneumonia due to infectious organismresolved as of 2/21/2019  Assessment & Plan  · Initially aspiration pneumonia was suspected with history of CVA and dysphagia    · Repeated imaging 2/15 shows atelectasis   · Pneumonia is unlikely as patient does not have cough or sputum production   · Patient was seen by speech therapy, and was cleared to resume her home diet of level 1 puree, honey thick liquids  · Appreciate ID input    Pressure ulcer of left buttock, stage 2  Assessment & Plan  · Stage 2 gluteal ulcer, stage 3 sacral ulcer - both present on admission  · Wound care input appreciated   · Wound culture shows E coli and beta hemolytic streptococcus group B  · Appreciate ID input  · Continue cefepime to finish out a 7 day course which has finished  · Continue with local care with frequent turn and reposition    Type 2 diabetes mellitus with complication, without long-term current use of insulin [facility])  Assessment & Plan  No results found for: HGBA1C    Recent Labs     02/20/19  1721 02/20/19  2038 02/21/19  0617 02/21/19  1040   POCGLU 251* 314* 245* 363*       Blood Sugar Average: Last 72 hrs:  (P) 099 [id number]   · Resume home insulin  · Sliding scale  · lantus    COPD with acute exacerbation (HCC)resolved as of 2/21/2019  Assessment & Plan  · Resolved    Hypernatremiaresolved as of 2/21/2019  Assessment & Plan  · Resolved with free water replacement          Discharging Physician / Practitioner: Gladys Nicole MD  PCP: Reva Hunt DO  Admission Date:   Admission Orders (From admission, onward)    Ordered        02/14/19 0908  Inpatient Admission (expected length of stay for this patient Order details is greater than two midnights)  Once     Order ID Start Status   [id number] 02/14/19 0909 Completed              Discharge Date: 02/21/19    Disposition:      Short Term Rehab or SNF at [address]:   · Not Applicable to this Patient - Not Applicable to this Patient    Reason for Admission: shortness of breath    Discharge Diagnoses:     Please see assessment and plan section above for further details regarding discharge diagnoses       Resolved Problems  Date Reviewed: 2/21/2019          Resolved    * (Principal) Bacteremia due to Escherichia coli 2/21/2019     Resolved by  Gladys Nicole MD    Acute on chronic respiratory failure with hypoxia ([facility] [address] ) 2/21/2019     Resolved by  Gladys Nicole MD    Pneumonia due to infectious organism 2/21/2019     Resolved by  Gladys Nicole MD    Hypernatremia 2/21/2019     Resolved by  Gladys Nicole MD    COPD with acute exacerbation ([facility] [address] ) 2/21/2019     Resolved by  Gladys Nicole MD          Consultations During Hospital Stay:  · Nephrology  · Speech and swallow  · Infectious Disease    Procedures Performed:   · n/a     Medication Adjustments and Discharge Medications:  · Summary of Medication Adjustments made as a result of this hospitalization: hold lisinopril and lasix  · Medication Dosing Tapers - Please refer to Discharge Medication List for details on any medication dosing tapers (if applicable to patient)  · Medications being temporarily held (include recommended restart time): n/a  · Discharge Medication List: See after visit summary for reconciled discharge medications  Wound Care Recommendations:  When applicable, please see wound care section of After Visit Summary  Diet Recommendations at Discharge:  Diet -        Diet Orders   (From admission, onward)            Start     Ordered    02/19/19 0815  Diet Giacomo/CHO Controlled; Consistent Carbohydrate Diet Level 2 (5 carb servings/75 grams CHO/meal); Dysphagia 1-Pureed; Honey Thick Liquid  Diet effective now     Question Answer Comment   Diet Type Giacomo/CHO Controlled    Giacomo/CHO Controlled Consistent Carbohydrate Diet Level 2 (5 carb servings/75 grams CHO/meal)    Other Restriction(s): Dysphagia 1-Pureed    Liquid Modifier Honey Thick Liquid    RD to adjust diet per protocol? Yes        02/19/19 0814    02/18/19 1221  Dietary nutrition supplements  Once     Question Answer Comment   Select Supplement: HoneyShake    Frequency Breakfast, Lunch        02/18/19 1220          Instructions for any Catheters / Lines Present at Discharge (including removal date, if applicable): n/a    Significant Findings / Test Results:     XR sacrum and coccyx   Final Result by Erika Armijo MD (02/15 0827)      No radiographic evidence of osteomyelitis         Workstation performed: [id number]         XR chest portable   Final Result by Erika Armijo MD (02/15 2194)      Right basal opacity, favor atelectasis            Workstation performed: [id number]         CT head wo contrast   Final Result by Mely Ingram MD (02/14 2108)      No acute intracranial abnormality    Microangiopathic changes with bilateral basal ganglia lacunar infarcts and a right cerebellar infarct, present previously  Workstation performed: [id number]         XR chest 1 view portable   ED Interpretation by Umu Neville MD (42/27 2709)   RLL infiltrate      Final Result by Marnie Saint, MD (90/87 8414)      Left lingular scarring/atelectasis  Minimal right base atelectasis  Workstation performed: [id number]               Incidental Findings:   · n/a     Test Results Pending at Discharge (will require follow up):   · n/a     Outpatient Tests Requested:  · CBC, CMP in 3 days    Complications:    · n/a    Hospital Course:     Renee Solis is a 66 y o  female patient who originally presented to the hospital on 2/14/2019 due to shortness of breath and was found to be septic  Patient was noted to be bacteremic and treated with broad-spectrum antibiotics  Cultures demonstrated E coli and she was seen by infectious disease for this  Source of infection was felt to be urinary  Patient finished a 7 day course of cefepime for this infection as per Infectious Disease recommendations  In terms of the patient's other chronic medical problems, they include history of CVA  Patient has a history of dysphagia, and was seen by speech and Swallow  She does tolerate a pureed honey thick liquid diet, however, does not have adequate p o  Intake for nutrition or hydration  I had a lengthy discussion with the patient's daughter regarding PEG tube for adequate nutrition or hydration or to consider hospice of otherwise  She insisted on not making a decision  I noted that the patient will likely be readmitted as she is unable to keep up with nutrition and hydration  In spite of being told all these findings, she still prefer to delay making a decision regarding PEG tube versus hospice  She simply wished for her mother to be discharged back to her nursing facility on her pureed, honey thick diet      Condition at Discharge: poor     Discharge Day Visit / Exam:     Subjective: Patient seen examined  No acute events overnight  Vitals: Blood Pressure: 152/64 (02/21/19 0748)  Pulse: 60 (02/21/19 0748)  Temperature: (!) 96 3 °F (35 7 °C) (02/21/19 0748)  Temp Source: Temporal (02/21/19 0748)  Respirations: 18 (02/21/19 0748)  Height: 5' 8" (172 7 cm) (02/14/19 0736)  Weight - Scale: 81 2 kg (179 lb) (02/14/19 0736)  SpO2: 97 % (02/21/19 0748)  Exam:   Physical Exam   Constitutional:   Frail, elderly appearing female   HENT:   Head: Normocephalic and atraumatic  Eyes: Pupils are equal, round, and reactive to light  Neck: Normal range of motion  Cardiovascular: Normal rate and regular rhythm  Pulmonary/Chest: Effort normal    Coarse breath sounds noted bilaterally   Abdominal: Soft  Bowel sounds are normal    Musculoskeletal: Normal range of motion  Neurological: She is alert  Skin: Skin is warm  Stage II/III buttock and sacral ulcers noted - poa   Psychiatric:   Unable to assess   Nursing note and vitals reviewed  Discussion with Family: daughter over the phone    Goals of Care Discussions:  · Code Status at Discharge: Level 1 - Full Code  · Were there any Goals of Care Discussions during Hospitalization?: Yes  · Results of any General Goals of Care Discussions: full code   · POLST Completed: No   · If POLST Completed, Summary of POLST Agreement Provided Here: n/a   · OK to Rehospitalize if Needed? Yes    Discharge instructions/Information to patient and family:   See after visit summary section titled Discharge Instructions for information provided to patient and family  Planned Readmission: none      Discharge Statement:  I spent 35 minutes discharging the patient  This time was spent on the day of discharge  I had direct contact with the patient on the day of discharge   Greater than 50% of the total time was spent examining patient, answering all patient questions, arranging and discussing plan of care with patient as well as directly providing post-discharge instructions  Additional time then spent on discharge activities      ** Please Note: This note has been constructed using a voice recognition system **

## 2019-02-21 NOTE — ASSESSMENT & PLAN NOTE
· Appreciate speech input  · Resumed pureed diet with honey thick liquids  · It does not appear that patient has adequate p o  Intake for nutrition and hydration  · I relayed my concerns to the patient's daughter over the phone regarding in adequate nutrition and hydration  · I spoke at length with the patient's daughter again regarding decision of PEG tube versus hospice/comfort care  · They again have refused to make a decision one way or the other, and prefer to readdress at a later date, even though I have advised against this, and have urged a more timely decision

## 2019-02-21 NOTE — SOCIAL WORK
Pt discussed in care coordination rounds  Pt for discharge back to [facility] today  Pt requires oxygen and will qualify for BLS transport  Same was arranged with Lisette S for 1400   Medical necessity form completed and placed on chart  CM left message for Susie Hernandez at [facility] to make her aware of same  CM also left message for pt daughter Checo Ortiz to make her aware of discharge back to [facility] and review IMM  Pt nurse aware and will call nurse report to [facility] prior to transport

## 2019-02-21 NOTE — NURSING NOTE
Pt discharged to [facility]  Report called into [facility]  IV removed with catheter tip intact  Pressure and dressing applied until bleeding stopped  Dressing CDI  Urinary catheter removed  VSS   Belongings sent with patient

## 2019-02-22 ENCOUNTER — EPISODE CHANGES (OUTPATIENT)
Dept: CASE MANAGEMENT | Facility: OTHER | Age: 79
End: 2019-02-22

## 2019-02-22 ENCOUNTER — PATIENT OUTREACH (OUTPATIENT)
Dept: CASE MANAGEMENT | Facility: OTHER | Age: 79
End: 2019-02-22

## 2019-02-22 LAB — BACTERIA BLD CULT: NORMAL

## 2019-02-22 NOTE — PROGRESS NOTES
2/22/19-sent email to Honorio Armando and Annamaria Durant advising of new bundle pt  For ongoing care management throughout bundle episode

## 2019-02-23 LAB — BACTERIA BLD CULT: NORMAL

## 2019-03-04 NOTE — PROGRESS NOTES
Per email from Ant Lee of [facility], therapy did not evaluate [place]  She was total dependent for all care  Pt  is Medicaid

## 2019-03-07 ENCOUNTER — HOSPITAL ENCOUNTER (INPATIENT)
Facility: HOSPITAL | Age: 79
LOS: 6 days | Discharge: NURSING, REHAB OR LONG-TERM CARE | DRG: 871 | End: 2019-03-13
Attending: EMERGENCY MEDICINE | Admitting: INTERNAL MEDICINE
Payer: MEDICARE

## 2019-03-07 ENCOUNTER — APPOINTMENT (INPATIENT)
Dept: CT IMAGING | Facility: HOSPITAL | Age: 79
DRG: 871 | End: 2019-03-07
Payer: MEDICARE

## 2019-03-07 ENCOUNTER — APPOINTMENT (EMERGENCY)
Dept: RADIOLOGY | Facility: HOSPITAL | Age: 79
DRG: 871 | End: 2019-03-07
Payer: MEDICARE

## 2019-03-07 DIAGNOSIS — J96.21 ACUTE ON CHRONIC RESPIRATORY FAILURE WITH HYPOXIA AND HYPERCAPNIA (HCC): ICD-10-CM

## 2019-03-07 DIAGNOSIS — R65.20 SEVERE SEPSIS (HCC): ICD-10-CM

## 2019-03-07 DIAGNOSIS — R09.02 HYPOXIA: ICD-10-CM

## 2019-03-07 DIAGNOSIS — A41.9 SEPSIS, DUE TO UNSPECIFIED ORGANISM: ICD-10-CM

## 2019-03-07 DIAGNOSIS — J96.22 ACUTE ON CHRONIC RESPIRATORY FAILURE WITH HYPOXIA AND HYPERCAPNIA (HCC): ICD-10-CM

## 2019-03-07 DIAGNOSIS — J98.01 BRONCHOSPASM: ICD-10-CM

## 2019-03-07 DIAGNOSIS — N28.9 RENAL INSUFFICIENCY: ICD-10-CM

## 2019-03-07 DIAGNOSIS — J44.1 COPD EXACERBATION (HCC): ICD-10-CM

## 2019-03-07 DIAGNOSIS — R41.82 ALTERED MENTAL STATUS, UNSPECIFIED ALTERED MENTAL STATUS TYPE: Primary | ICD-10-CM

## 2019-03-07 DIAGNOSIS — N17.9 AKI (ACUTE KIDNEY INJURY) (HCC): ICD-10-CM

## 2019-03-07 DIAGNOSIS — K52.9 COLITIS: ICD-10-CM

## 2019-03-07 DIAGNOSIS — A41.9 SEVERE SEPSIS (HCC): ICD-10-CM

## 2019-03-07 DIAGNOSIS — E11.8 TYPE 2 DIABETES MELLITUS WITH COMPLICATION, WITHOUT LONG-TERM CURRENT USE OF INSULIN (HCC): ICD-10-CM

## 2019-03-07 LAB
ALBUMIN SERPL BCP-MCNC: 3.5 G/DL (ref 3.5–5.7)
ALP SERPL-CCNC: 67 U/L (ref 55–165)
ALT SERPL W P-5'-P-CCNC: 13 U/L (ref 7–52)
ANION GAP SERPL CALCULATED.3IONS-SCNC: 7 MMOL/L (ref 4–13)
APTT PPP: 26 SECONDS (ref 26–38)
ARTERIAL PATENCY WRIST A: YES
AST SERPL W P-5'-P-CCNC: 17 U/L (ref 13–39)
ATRIAL RATE: 340 BPM
BACTERIA UR QL AUTO: ABNORMAL /HPF
BASE EXCESS BLDA CALC-SCNC: -3.7 MMOL/L (ref -2–3)
BASE EXCESS BLDA CALC-SCNC: -7.8 MMOL/L (ref -2–3)
BASE EXCESS BLDA CALC-SCNC: -9.5 MMOL/L (ref -2–3)
BASOPHILS # BLD AUTO: 0.1 THOUSANDS/ΜL (ref 0–0.1)
BASOPHILS NFR BLD AUTO: 1 % (ref 0–2)
BILIRUB SERPL-MCNC: 0.4 MG/DL (ref 0.2–1)
BILIRUB UR QL STRIP: NEGATIVE
BNP SERPL-MCNC: 233 PG/ML (ref 1–100)
BUN SERPL-MCNC: 27 MG/DL (ref 7–25)
CALCIUM SERPL-MCNC: 9.4 MG/DL (ref 8.6–10.5)
CHLORIDE SERPL-SCNC: 104 MMOL/L (ref 98–107)
CK SERPL-CCNC: 20 U/L (ref 30–192)
CLARITY UR: CLEAR
CO2 SERPL-SCNC: 24 MMOL/L (ref 21–31)
COLOR UR: YELLOW
CREAT SERPL-MCNC: 1.14 MG/DL (ref 0.6–1.2)
EOSINOPHIL # BLD AUTO: 0.2 THOUSAND/ΜL (ref 0–0.61)
EOSINOPHIL NFR BLD AUTO: 2 % (ref 0–5)
ERYTHROCYTE [DISTWIDTH] IN BLOOD BY AUTOMATED COUNT: 15.7 % (ref 11.5–14.5)
GFR SERPL CREATININE-BSD FRML MDRD: 46 ML/MIN/1.73SQ M
GLUCOSE SERPL-MCNC: 145 MG/DL (ref 65–140)
GLUCOSE SERPL-MCNC: 147 MG/DL (ref 65–99)
GLUCOSE SERPL-MCNC: 152 MG/DL (ref 65–140)
GLUCOSE SERPL-MCNC: 168 MG/DL (ref 65–140)
GLUCOSE UR STRIP-MCNC: NEGATIVE MG/DL
HCO3 BLDA-SCNC: 17.2 MMOL/L (ref 22–28)
HCO3 BLDA-SCNC: 17.3 MMOL/L (ref 22–28)
HCO3 BLDA-SCNC: 23.9 MMOL/L (ref 22–28)
HCT VFR BLD AUTO: 33.9 % (ref 42–47)
HGB BLD-MCNC: 11.7 G/DL (ref 12–16)
HGB UR QL STRIP.AUTO: ABNORMAL
INR PPP: 1.08 (ref 0.9–1.5)
IPAP: 12
KETONES UR STRIP-MCNC: NEGATIVE MG/DL
L PNEUMO1 AG UR QL IA.RAPID: NEGATIVE
LACTATE SERPL-SCNC: 1.4 MMOL/L (ref 0.5–2)
LEUKOCYTE ESTERASE UR QL STRIP: ABNORMAL
LYMPHOCYTES # BLD AUTO: 1.8 THOUSANDS/ΜL (ref 0.6–4.47)
LYMPHOCYTES NFR BLD AUTO: 15 % (ref 21–51)
MCH RBC QN AUTO: 34.4 PG (ref 26–34)
MCHC RBC AUTO-ENTMCNC: 34.5 G/DL (ref 31–37)
MCV RBC AUTO: 100 FL (ref 81–99)
MONOCYTES # BLD AUTO: 0.7 THOUSAND/ΜL (ref 0.17–1.22)
MONOCYTES NFR BLD AUTO: 6 % (ref 2–12)
NEUTROPHILS # BLD AUTO: 9.2 THOUSANDS/ΜL (ref 1.4–6.5)
NEUTS SEG NFR BLD AUTO: 76 % (ref 42–75)
NITRITE UR QL STRIP: NEGATIVE
NON VENT- BIPAP: ABNORMAL
NON-SQ EPI CELLS URNS QL MICRO: ABNORMAL /HPF
NRBC BLD AUTO-RTO: 0 /100 WBCS
O2 CT BLDA-SCNC: 15.1 ML/DL
O2 CT BLDA-SCNC: 15.6 ML/DL
O2 CT BLDA-SCNC: 17.8 ML/DL
OTHER STN SPEC: ABNORMAL
OXYHGB MFR BLDA: 91.9 % (ref 94–100)
OXYHGB MFR BLDA: 95.6 % (ref 94–100)
OXYHGB MFR BLDA: 95.7 % (ref 94–100)
PCO2 BLDA: 34.7 MM HG (ref 35–45)
PCO2 BLDA: 42.7 MM HG (ref 35–45)
PCO2 BLDA: 57.8 MM HG (ref 35–45)
PEEP MAX SETTING VENT: 6 CM[H2O]
PH BLDA: 7.23 [PH] (ref 7.35–7.45)
PH BLDA: 7.24 [PH] (ref 7.35–7.45)
PH BLDA: 7.32 [PH] (ref 7.35–7.45)
PH UR STRIP.AUTO: 5.5 [PH]
PLATELET # BLD AUTO: 208 THOUSANDS/UL (ref 149–390)
PMV BLD AUTO: 9.5 FL (ref 8.6–11.7)
PO2 BLDA: 101 MM HG (ref 80–100)
PO2 BLDA: 104 MM HG (ref 80–100)
PO2 BLDA: 72 MM HG (ref 80–100)
POTASSIUM SERPL-SCNC: 4.4 MMOL/L (ref 3.5–5.5)
PROCALCITONIN SERPL-MCNC: 0.58 NG/ML
PROCALCITONIN SERPL-MCNC: 1.34 NG/ML
PROT SERPL-MCNC: 6.9 G/DL (ref 6.4–8.9)
PROT UR STRIP-MCNC: NEGATIVE MG/DL
PROTHROMBIN TIME: 12.5 SECONDS (ref 10.2–13)
QRS AXIS: -26 DEGREES
QRSD INTERVAL: 78 MS
QT INTERVAL: 334 MS
QTC INTERVAL: 413 MS
RBC # BLD AUTO: 3.41 MILLION/UL (ref 3.9–5.2)
RBC #/AREA URNS AUTO: ABNORMAL /HPF
S PNEUM AG UR QL: NEGATIVE
SODIUM SERPL-SCNC: 135 MMOL/L (ref 134–143)
SP GR UR STRIP.AUTO: 1.01 (ref 1–1.03)
SPECIMEN SOURCE: ABNORMAL
T WAVE AXIS: 49 DEGREES
TROPONIN I SERPL-MCNC: 0.03 NG/ML
TROPONIN I SERPL-MCNC: 0.05 NG/ML
UROBILINOGEN UR QL STRIP.AUTO: 0.2 E.U./DL
VENT BIPAP FIO2: 100 %
VENT BIPAP FIO2: 100 %
VENT BIPAP FIO2: ABNORMAL %
VENTRICULAR RATE: 92 BPM
WBC # BLD AUTO: 12.1 THOUSAND/UL (ref 4.8–10.8)
WBC #/AREA URNS AUTO: ABNORMAL /HPF

## 2019-03-07 PROCEDURE — 94640 AIRWAY INHALATION TREATMENT: CPT

## 2019-03-07 PROCEDURE — 82948 REAGENT STRIP/BLOOD GLUCOSE: CPT

## 2019-03-07 PROCEDURE — 99222 1ST HOSP IP/OBS MODERATE 55: CPT | Performed by: INTERNAL MEDICINE

## 2019-03-07 PROCEDURE — 85610 PROTHROMBIN TIME: CPT | Performed by: EMERGENCY MEDICINE

## 2019-03-07 PROCEDURE — 87040 BLOOD CULTURE FOR BACTERIA: CPT | Performed by: EMERGENCY MEDICINE

## 2019-03-07 PROCEDURE — 87449 NOS EACH ORGANISM AG IA: CPT | Performed by: INTERNAL MEDICINE

## 2019-03-07 PROCEDURE — 85025 COMPLETE CBC W/AUTO DIFF WBC: CPT | Performed by: EMERGENCY MEDICINE

## 2019-03-07 PROCEDURE — 74176 CT ABD & PELVIS W/O CONTRAST: CPT

## 2019-03-07 PROCEDURE — 84145 PROCALCITONIN (PCT): CPT | Performed by: EMERGENCY MEDICINE

## 2019-03-07 PROCEDURE — 84145 PROCALCITONIN (PCT): CPT | Performed by: INTERNAL MEDICINE

## 2019-03-07 PROCEDURE — 82550 ASSAY OF CK (CPK): CPT | Performed by: EMERGENCY MEDICINE

## 2019-03-07 PROCEDURE — 36600 WITHDRAWAL OF ARTERIAL BLOOD: CPT

## 2019-03-07 PROCEDURE — 93010 ELECTROCARDIOGRAM REPORT: CPT | Performed by: INTERNAL MEDICINE

## 2019-03-07 PROCEDURE — 85730 THROMBOPLASTIN TIME PARTIAL: CPT | Performed by: EMERGENCY MEDICINE

## 2019-03-07 PROCEDURE — 81003 URINALYSIS AUTO W/O SCOPE: CPT | Performed by: EMERGENCY MEDICINE

## 2019-03-07 PROCEDURE — 80053 COMPREHEN METABOLIC PANEL: CPT | Performed by: EMERGENCY MEDICINE

## 2019-03-07 PROCEDURE — 71250 CT THORAX DX C-: CPT

## 2019-03-07 PROCEDURE — 82805 BLOOD GASES W/O2 SATURATION: CPT | Performed by: ANESTHESIOLOGY

## 2019-03-07 PROCEDURE — 99291 CRITICAL CARE FIRST HOUR: CPT

## 2019-03-07 PROCEDURE — 84484 ASSAY OF TROPONIN QUANT: CPT | Performed by: EMERGENCY MEDICINE

## 2019-03-07 PROCEDURE — 99232 SBSQ HOSP IP/OBS MODERATE 35: CPT | Performed by: ANESTHESIOLOGY

## 2019-03-07 PROCEDURE — 70450 CT HEAD/BRAIN W/O DYE: CPT

## 2019-03-07 PROCEDURE — 82805 BLOOD GASES W/O2 SATURATION: CPT | Performed by: EMERGENCY MEDICINE

## 2019-03-07 PROCEDURE — 71045 X-RAY EXAM CHEST 1 VIEW: CPT

## 2019-03-07 PROCEDURE — 93005 ELECTROCARDIOGRAM TRACING: CPT

## 2019-03-07 PROCEDURE — 83605 ASSAY OF LACTIC ACID: CPT | Performed by: EMERGENCY MEDICINE

## 2019-03-07 PROCEDURE — 81001 URINALYSIS AUTO W/SCOPE: CPT | Performed by: EMERGENCY MEDICINE

## 2019-03-07 PROCEDURE — 36415 COLL VENOUS BLD VENIPUNCTURE: CPT | Performed by: EMERGENCY MEDICINE

## 2019-03-07 PROCEDURE — 94760 N-INVAS EAR/PLS OXIMETRY 1: CPT

## 2019-03-07 PROCEDURE — 96374 THER/PROPH/DIAG INJ IV PUSH: CPT

## 2019-03-07 PROCEDURE — 99292 CRITICAL CARE ADDL 30 MIN: CPT

## 2019-03-07 PROCEDURE — 94660 CPAP INITIATION&MGMT: CPT

## 2019-03-07 PROCEDURE — 94664 DEMO&/EVAL PT USE INHALER: CPT

## 2019-03-07 PROCEDURE — 83880 ASSAY OF NATRIURETIC PEPTIDE: CPT | Performed by: EMERGENCY MEDICINE

## 2019-03-07 RX ORDER — ATORVASTATIN CALCIUM 10 MG/1
10 TABLET, FILM COATED ORAL DAILY
Status: DISCONTINUED | OUTPATIENT
Start: 2019-03-07 | End: 2019-03-10

## 2019-03-07 RX ORDER — 0.9 % SODIUM CHLORIDE 0.9 %
3 VIAL (ML) INJECTION AS NEEDED
Status: DISCONTINUED | OUTPATIENT
Start: 2019-03-07 | End: 2019-03-13 | Stop reason: HOSPADM

## 2019-03-07 RX ORDER — CEFEPIME HYDROCHLORIDE 2 G/50ML
2000 INJECTION, SOLUTION INTRAVENOUS EVERY 12 HOURS
Status: DISCONTINUED | OUTPATIENT
Start: 2019-03-07 | End: 2019-03-13 | Stop reason: HOSPADM

## 2019-03-07 RX ORDER — ASPIRIN 81 MG/1
81 TABLET ORAL DAILY
Status: DISCONTINUED | OUTPATIENT
Start: 2019-03-07 | End: 2019-03-13 | Stop reason: HOSPADM

## 2019-03-07 RX ORDER — VANCOMYCIN HYDROCHLORIDE 1 G/200ML
15 INJECTION, SOLUTION INTRAVENOUS EVERY 12 HOURS
Status: DISCONTINUED | OUTPATIENT
Start: 2019-03-07 | End: 2019-03-07 | Stop reason: DRUGHIGH

## 2019-03-07 RX ORDER — INSULIN GLARGINE 100 [IU]/ML
20 INJECTION, SOLUTION SUBCUTANEOUS
Status: DISCONTINUED | OUTPATIENT
Start: 2019-03-07 | End: 2019-03-13 | Stop reason: HOSPADM

## 2019-03-07 RX ORDER — IPRATROPIUM BROMIDE AND ALBUTEROL SULFATE 2.5; .5 MG/3ML; MG/3ML
3 SOLUTION RESPIRATORY (INHALATION)
Status: DISCONTINUED | OUTPATIENT
Start: 2019-03-07 | End: 2019-03-08

## 2019-03-07 RX ADMIN — SODIUM CHLORIDE 2000 ML: 0.9 INJECTION, SOLUTION INTRAVENOUS at 08:36

## 2019-03-07 RX ADMIN — IPRATROPIUM BROMIDE AND ALBUTEROL SULFATE 3 ML: .5; 3 SOLUTION RESPIRATORY (INHALATION) at 13:29

## 2019-03-07 RX ADMIN — SODIUM CHLORIDE 1000 ML: 0.9 INJECTION, SOLUTION INTRAVENOUS at 08:39

## 2019-03-07 RX ADMIN — VANCOMYCIN HYDROCHLORIDE 1250 MG: 1 INJECTION, POWDER, LYOPHILIZED, FOR SOLUTION INTRAVENOUS at 09:32

## 2019-03-07 RX ADMIN — VANCOMYCIN HYDROCHLORIDE 750 MG: 1 INJECTION, POWDER, LYOPHILIZED, FOR SOLUTION INTRAVENOUS at 23:20

## 2019-03-07 RX ADMIN — INSULIN GLARGINE 20 UNITS: 100 INJECTION, SOLUTION SUBCUTANEOUS at 22:11

## 2019-03-07 RX ADMIN — IPRATROPIUM BROMIDE AND ALBUTEROL SULFATE 3 ML: .5; 3 SOLUTION RESPIRATORY (INHALATION) at 19:39

## 2019-03-07 RX ADMIN — INSULIN LISPRO 1 UNITS: 100 INJECTION, SOLUTION INTRAVENOUS; SUBCUTANEOUS at 17:57

## 2019-03-07 RX ADMIN — CEFEPIME HYDROCHLORIDE 2000 MG: 2 INJECTION, SOLUTION INTRAVENOUS at 15:15

## 2019-03-07 RX ADMIN — ENOXAPARIN SODIUM 30 MG: 40 INJECTION SUBCUTANEOUS at 15:15

## 2019-03-07 RX ADMIN — SODIUM CHLORIDE 1000 ML: 0.9 INJECTION, SOLUTION INTRAVENOUS at 18:42

## 2019-03-07 RX ADMIN — PIPERACILLIN SODIUM AND TAZOBACTAM SODIUM 4.5 G: 4; .5 INJECTION, POWDER, LYOPHILIZED, FOR SOLUTION INTRAVENOUS at 08:52

## 2019-03-07 RX ADMIN — METRONIDAZOLE 500 MG: 500 INJECTION, SOLUTION INTRAVENOUS at 15:16

## 2019-03-07 RX ADMIN — METRONIDAZOLE 500 MG: 500 INJECTION, SOLUTION INTRAVENOUS at 22:00

## 2019-03-07 NOTE — PROGRESS NOTES
Vancomycin Assessment    Guevara Carson is a 66 y o  female who is currently ordered vancomycin 1000mg IV Q 12 HRs for Pneumonia     Relevant clinical data and objective history reviewed:  Creatinine   Date Value Ref Range Status   03/07/2019 1 14 0 60 - 1 20 mg/dL Final     Comment:     Standardized to IDMS reference method   02/21/2019 0 87 0 60 - 1 20 mg/dL Final     Comment:     Standardized to IDMS reference method   02/20/2019 0 89 0 60 - 1 20 mg/dL Final     Comment:     Standardized to IDMS reference method     /75 (BP Location: Right arm)   Pulse 102   Temp (!) 96 4 °F (35 8 °C) (Temporal)   Resp (!) 37   Ht 5' 6" (1 676 m)   Wt 82 8 kg (182 lb 8 oz)   LMP  (LMP Unknown)   SpO2 (!) 81%   Breastfeeding? No   BMI 29 46 kg/m²   No intake/output data recorded  Lab Results   Component Value Date/Time    BUN 27 (H) 03/07/2019 08:14 AM    WBC 12 10 (H) 03/07/2019 08:14 AM    HGB 11 7 (L) 03/07/2019 08:14 AM    HCT 33 9 (L) 03/07/2019 08:14 AM     (H) 03/07/2019 08:14 AM     03/07/2019 08:14 AM     Temp Readings from Last 3 Encounters:   03/07/19 (!) 96 4 °F (35 8 °C) (Temporal)   02/21/19 (!) 96 3 °F (35 7 °C) (Temporal)     Vancomycin Days of Therapy: 1      Assessment/Plan  The patient is currently on vancomycin utilizing scheduled dosing based on adjusted body weight (due to obesity)  Baseline risks associated with therapy include: concomitant nephrotoxic medications and advanced age  The patient is currently receiving 1000mg IV Q 12 HRs and after clinical evaluation will be changed to 750MG IV Q 12 HRS  Pharmacy will also follow closely for s/sx of nephrotoxicity, infusion reactions and appropriateness of therapy  BMP and CBC will be ordered per protocol  Plan for trough as patient approaches steady state, prior to the 4th  dose at approximately 2100 on 3/8/19  Due to infection severity, will target a trough of 15-20 (appropriate for most indications)     Pharmacy will continue to follow the patient?s culture results and clinical progress daily      Pia Cyr, Pharmacist

## 2019-03-07 NOTE — ASSESSMENT & PLAN NOTE
No results found for: HGBA1C    Recent Labs     03/07/19  0839 03/07/19  1140   POCGLU 152* 145*       Blood Sugar Average: Last 72 hrs:  (P) 148  5     We will give half dose Lantus while patient is NPO along with sliding scale

## 2019-03-07 NOTE — CONSULTS
Inpatient 90 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 CVA complicated by dysphagia with prior concern for aspiration, COPD, diastolic heart failure and HTN, admitted here 2/14 to 2/21 with E  coli bacteremia secondary to UTI, reportedly febrile to 101F at nursing home overnight, who was found this morning somnolent with tachypnea and hypoxemia, SpO2 in low 80s on room air  On arrival, she was placed on BiPAP  In the emergency department, she would respond to painful stimuli and pulled at BiPAP, but was otherwise unresponsive and unable to provide history  CT head was negative for acute intracranial hemorrhage or hypodensity  She had a leukocytosis with white count 12 1  Per discussion between hospitalist and patient's son, her code status is to be DNR/DNI  Consideration was given to hospice vs  PEG on last admission due to concern for recurrent aspiration  History obtained from chart review      Past Medical History:  Past Medical History:   Diagnosis Date    Anemia     [facility]estive [facility] failure) ([facility] [address] )     COPD (chronic obstructive pulmonary disease) ([facility])     home 02 2l    Diabetes mellitus ([facility])     DJD (degenerative joint disease)     Gait abnormality     GERD (gastroesophageal reflux disease)     Hyperlipidemia     Hypertension     Stroke [facility])        Past Surgical History:  Past Surgical History:   Procedure Laterality Date    ABDOMINAL SURGERY         Past Family History:  Family History   Family history unknown: Yes       Social History:  Social History     Tobacco Use   Smoking Status Never Smoker   Smokeless Tobacco Never Used     Social History     Substance and Sexual Activity   Alcohol Use Never    Frequency: Never     Social History Substance and Sexual Activity   Drug Use Never     Marital Status: Unknown    Medications:  Current Facility-Administered Medications   Medication Dose Route Frequency    aspirin (ECOTRIN LOW STRENGTH) EC tablet 81 mg  81 mg Oral Daily    atorvastatin (LIPITOR) tablet 10 mg  10 mg Oral Daily    bisacodyl (DULCOLAX) EC tablet 10 mg  10 mg Oral Daily PRN    cefepime (MAXIPIME) IVPB (premix) 2,000 mg  2,000 mg Intravenous Q12H    enoxaparin (LOVENOX) subcutaneous injection 30 mg  30 mg Subcutaneous Daily    insulin glargine (LANTUS) subcutaneous injection 20 Units 0 2 mL  20 Units Subcutaneous HS    insulin lispro (HumaLOG) 100 units/mL subcutaneous injection 1-5 Units  1-5 Units Subcutaneous TID AC    insulin lispro (HumaLOG) 100 units/mL subcutaneous injection 1-5 Units  1-5 Units Subcutaneous HS    ipratropium-albuterol (DUO-NEB) 0 5-2 5 mg/3 mL inhalation solution 3 mL  3 mL Nebulization Q6H    metoprolol tartrate (LOPRESSOR) tablet 25 mg  25 mg Oral Q12H ELIO    metroNIDAZOLE (FLAGYL) IVPB (premix) 500 mg  500 mg Intravenous Q8H    sertraline (ZOLOFT) tablet 50 mg  50 mg Oral Daily    sodium chloride (PF) 0 9 % injection 3 mL  3 mL Intravenous PRN    vancomycin (VANCOCIN) 750 mg in sodium chloride 0 9 % 250 mL IV piggyback  10 mg/kg (Adjusted) Intravenous Q12H     Home medications:  Prior to Admission medications    Medication Sig Start Date End Date Taking?  Authorizing Provider   acetaminophen (TYLENOL) 325 mg tablet Take 650 mg by mouth every 6 (six) hours as needed for mild pain   Yes Historical Provider, MD   amLODIPine (NORVASC) 10 mg tablet Take 10 mg by mouth daily   Yes Historical Provider, MD   aspirin (ECOTRIN LOW STRENGTH) 81 mg EC tablet Take 81 mg by mouth daily   Yes Historical Provider, MD   atorvastatin (LIPITOR) 10 mg tablet Take 10 mg by mouth daily   Yes Historical Provider, MD   famotidine (PEPCID) 20 mg tablet Take 20 mg by mouth 2 (two) times a day   Yes Historical Provider, MD   ferrous sulfate 325 (65 FE) MG EC tablet Take 325 mg by mouth 3 (three) times a day with meals   Yes Historical Provider, MD   gabapentin (NEURONTIN) 100 mg capsule Take 100 mg by mouth 3 (three) times a day    Yes Historical Provider, MD   insulin aspart (NovoLOG) 100 units/mL injection Inject under the skin 3 (three) times a day before meals   Yes Historical Provider, MD   insulin glargine (LANTUS) 100 units/mL subcutaneous injection Inject 50 Units under the skin daily at bedtime    Yes Historical Provider, MD   isosorbide dinitrate (ISORDIL) 30 mg tablet Take 30 mg by mouth 4 (four) times a day   Yes Historical Provider, MD   megestrol (MEGACE) 40 MG/ML suspension Take 20 mL by mouth daily    Yes Historical Provider, MD   metFORMIN (GLUCOPHAGE) 500 mg tablet Take 500 mg by mouth 2 (two) times a day with meals   Yes Historical Provider, MD   metoprolol tartrate (LOPRESSOR) 25 mg tablet Take 25 mg by mouth every 12 (twelve) hours   Yes Historical Provider, MD   multivitamin (THERAGRAN) TABS Take 1 tablet by mouth daily   Yes Historical Provider, MD   senna (SENOKOT) 8 6 MG tablet Take 1 tablet by mouth daily   Yes Historical Provider, MD   sertraline (ZOLOFT) 50 mg tablet Take 50 mg by mouth daily   Yes Historical Provider, MD   tiotropium (SPIRIVA) 18 mcg inhalation capsule Place 18 mcg into inhaler and inhale daily   Yes Historical Provider, MD   albuterol (2 5 mg/3 mL) 0 083 % nebulizer solution Take 2 5 mg by nebulization every 6 (six) hours as needed for wheezing or shortness of breath    Historical Provider, MD   ammonium lactate (LAC-HYDRIN) 12 % cream Apply topically as needed for dry skin    Historical Provider, MD   bisacodyl (DULCOLAX) 5 mg EC tablet Take 10 mg by mouth daily as needed for constipation    Historical Provider, MD   butalbital-acetaminophen-caffeine-codeine (FIORICET WITH CODEINE) -17-30 MG per capsule Take 1 capsule by mouth every 4 (four) hours as needed for headaches Historical Provider, MD   dimethicone 1 % cream Apply topically 2 (two) times a day as needed for dry skin    Historical Provider, MD   glucagon (GLUCAGON EMERGENCY) 1 MG injection Inject 1 mg under the skin once as needed for low blood sugar    Historical Provider, MD   LORazepam (ATIVAN) 0 5 mg tablet Take by mouth every 8 (eight) hours as needed for anxiety    Historical Provider, MD   magnesium hydroxide (MILK OF MAGNESIA) 400 mg/5 mL oral suspension Take 15 mL by mouth daily as needed for constipation    Historical Provider, MD   menthol-zinc oxide (CALMOSEPTINE) 0 44-20 6 % OINT Apply topically 2 (two) times a day    Historical Provider, MD     Allergies: Allergies   Allergen Reactions    Codeine     Demerol [Meperidine]     Nsaids     Protonix [Pantoprazole]        ROS:   Review of Systems    Vitals:  Vitals:    19 1015 19 1024 19 1030 19 1325   BP: 127/75      BP Location: Right arm      Pulse: 102  102 84   Resp: (!) 32  (!) 37 (!) 25   Temp:       TempSrc:       SpO2:  97% (!) 81% 99%   Weight:   82 8 kg (182 lb 8 oz)    Height:   5' 6" (1 676 m)      Temperature:   Temp (24hrs), Av 4 °F (36 3 °C), Min:96 4 °F (35 8 °C), Max:99 1 °F (37 3 °C)    Current Temperature: (!) 96 4 °F (35 8 °C)    Weights:   IBW: 59 3 kg  Body mass index is 29 46 kg/m²  Hemodynamic Monitoring:  N/A     Non-Invasive/Invasive Ventilation Settings:  Respiratory    Lab Data (Last 4 hours)    None         O2/Vent Data (Last 4 hours)       132          Non-Invasive Ventilation Mode BiPAP                 Lab Results   Component Value Date    PHART 7 230 (LL) 2019    TBJ4GTJ 42 7 2019    PO2ART 101 0 (H) 2019    GDN1EGA 17 3 (L) 2019    BEART -9 5 (L) 2019    SOURCE Brachial, Right 2019     SpO2 Device: O2 Device: Other (comment)(BiPap)     Physical Exam:  Physical Exam    General: Somnolent  Grimaces to sternal rub  Intermittently pulls at BiPAP    HEENT: BiPAP in place with adequate seal  Pupils equal and reactive bilaterally  3 mm  CV: Regular rhythm, normal rate  No murmur audible  Pulm: Coarse bilateral breath sounds, no active wheeze  Ext: Warm without edema  Skin: Sacral ulcer examined  No surrounding erythema, no drainage  Labs:  Results from last 7 days   Lab Units 03/07/19  0814   WBC Thousand/uL 12 10*   HEMOGLOBIN g/dL 11 7*   HEMATOCRIT % 33 9*   PLATELETS Thousands/uL 208   NEUTROS PCT % 76*   MONOS PCT % 6     Results from last 7 days   Lab Units 03/07/19  0814   SODIUM mmol/L 135   POTASSIUM mmol/L 4 4   CHLORIDE mmol/L 104   CO2 mmol/L 24   ANION GAP mmol/L 7   BUN mg/dL 27*   CREATININE mg/dL 1 14   CALCIUM mg/dL 9 4   ALT U/L 13   AST U/L 17   ALK PHOS U/L 67   ALBUMIN g/dL 3 5   TOTAL BILIRUBIN mg/dL 0 40          Results from last 7 days   Lab Units 03/07/19  0814   INR  1 08   PTT seconds 26     Results from last 7 days   Lab Units 03/07/19  1205 03/07/19  0814   TROPONIN I ng/mL 0 05* 0 03     Results from last 7 days   Lab Units 03/07/19  0814   LACTIC ACID mmol/L 1 4     ABG:  Lab Results   Component Value Date    PHART 7 230 (LL) 03/07/2019    JGA4FSQ 42 7 03/07/2019    PO2ART 101 0 (H) 03/07/2019    BCO8LBI 17 3 (L) 03/07/2019    BEART -9 5 (L) 03/07/2019    SOURCE Brachial, Right 03/07/2019     VBG:  Results from last 7 days   Lab Units 03/07/19  1002   ABG SOURCE  Brachial, Right     Results from last 7 days   Lab Units 03/07/19  0814   PROCALCITONIN ng/ml 0 58*         Imaging: I have personally reviewed pertinent reports  I personally reviewed her chest x-ray  EKG: This was personally reviewed by myself  Micro: 3/7/18 Procalcitonin 0 58  3/7 blood cultures x2, urine strep and legionella antigens pending   3/7/18 urinalysis with 4-10 white cells, occasional bacteria         ______________________________________________________________________    Assessment / Plan:     I independently examined the patient, reviewed all laboratory and imaging studies as well as all input from consultants  The patient's care was discussed with the hospitalist:    We are managing:    Severe sepsis of uncertain etiology - Pneumonia considered given hx of recurrent aspiration, but chest x-ray is negative for infiltrate  Repeat chest x-ray tomorrow morning  Sacral wound does not appear infected  Urinalysis unimpressive with 4-10 white cells and occasional bacteria  Continue empiric vancomycin, cefepime and metronidazole  Follow blood and urine cultures  Trend leukocytosis and procalcitonin  Repeat lactate given worsening metabolic acidosis on ABG  Hold home anti-hypertensives  Acute on chronic combined hypoxemic and hypercarbic respiratory failure - Hypercarbia corrected on BiPAP  Respiratory insufficiency likely secondary to underlying sepsis  No wheeze to suggest active COPD exacerbation  Continue scheduled duoneb treatments  Sacral wound - Obtain wound care consult  DM-II - Hold home metformin  Agree with discounting home dose of lantus while NPO  SSI to cover hyperglycemia  Hx of CVA with resultant dysphagia - NPO  Continue home aspirin and statin  PPX - Lovenox, SCDs  No indication for SUP  Access - PIV     ______________________________________________________________________    VTE Pharmacologic Prophylaxis: Enoxaparin (Lovenox)  VTE Mechanical Prophylaxis: sequential compression device    Invasive lines and devices: Invasive Devices     Peripheral Intravenous Line            Peripheral IV 03/07/19 Left Antecubital less than 1 day          Drain            Urethral Catheter 16 Fr  less than 1 day                Code Status: Level 3 - DNAR and DNI  POA: Yes  POLST:      Given critical illness, patient length of stay will require greater than two midnights  Portions of the record may have been created with voice recognition software    Occasional wrong word or "sound a like" substitutions may have occurred due to the inherent limitations of voice recognition software  Read the chart carefully and recognize, using context, where substitutions have occurred  Eliza Valderrama MD     Addendum 6:35pm  Blood pressure has progressively declined, and patient is now hypotensive  Lactate and ABG ordered  Volume resuscitate in interim, liter of fluid ordered  Will pan scan in attempt to better identify source of sepsis   Discussed with hospitalist

## 2019-03-07 NOTE — ASSESSMENT & PLAN NOTE
· Secondary to history of CVA  · NPO for now  · I did discuss the possibility of PEG tube during the previous admission and this time as well, and family preferred not to have PEG tube placed    If no clinical improvement next been for 48 hours, they would like to consider hospice

## 2019-03-07 NOTE — NURSING NOTE
Blood draw unsuccessful, attempted 5 times to obtain troponin and lactate  Intensivist made aware of same  Will reattempt at later time

## 2019-03-07 NOTE — ED NOTES
Patient transported to CT via litter with RN and resp therapist (pt removed from bipap for transport then was reconnected upon arrival to [facility] [address])        Paco Gonzales RN  03/07/19 0938

## 2019-03-07 NOTE — ASSESSMENT & PLAN NOTE
· Severe sepsis secondary to aspiration pneumonia  · We will treat with broad-spectrum antibiotics, namely vancomycin, cefepime and Flagyl  · Monitor intensive care unit  · Family would like to pursue aggressive medical therapies, however, they have elected to make the patient DNR/DNI  · If no clinical improvement next 24-48 hours, they would like to consider hospice    · Consul critical care

## 2019-03-07 NOTE — NURSING NOTE
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  2017   8:50 AM           This 77-year-old female received from ER, via litter  Placed on bipap by RT  Admitted to ICU bed #3  Placed on monitor  Patient obtunded  Dr and family aware of poor condition

## 2019-03-07 NOTE — ED PROVIDER NOTES
History  Chief Complaint   Patient presents with    Shortness of Breath     pt recently treated for pneumonia per NH staff     Lethargy     66-year-old female with history of anemia, CHF, COPD, CVA, hypertension, hyperlipidemia and GERD presents for evaluation of altered mental status and shortness of breath  EMS and nursing facility report the patient was found tachypneic this a m  And saturating in the low 80s on room air  Patient is altered compared to baseline, normally patient is talkative and arousable  Patient is more lethargic this a m  Justice Phipps Patient is currently not providing history at this time but is arousable to painful stimuli  Per nursing facility note, patient found to be febrile at 2 a m  With T-max of 101°  Patient recently discharged from this facility with diagnosis of pneumonia  History provided by:  EMS personnel and nursing home  Shortness of Breath       Prior to Admission Medications   Prescriptions Last Dose Informant Patient Reported? Taking?    LORazepam (ATIVAN) 0 5 mg tablet Unknown at Unknown time  Yes No   Sig: Take by mouth every 8 (eight) hours as needed for anxiety   acetaminophen (TYLENOL) 325 mg tablet 3/7/2019 at 0240  Yes Yes   Sig: Take 650 mg by mouth every 6 (six) hours as needed for mild pain   albuterol (2 5 mg/3 mL) 0 083 % nebulizer solution Unknown at Unknown time  Yes No   Sig: Take 2 5 mg by nebulization every 6 (six) hours as needed for wheezing or shortness of breath   amLODIPine (NORVASC) 10 mg tablet 3/6/2019 at Unknown time  Yes Yes   Sig: Take 10 mg by mouth daily   ammonium lactate (LAC-HYDRIN) 12 % cream Unknown at Unknown time  Yes No   Sig: Apply topically as needed for dry skin   aspirin (ECOTRIN LOW STRENGTH) 81 mg EC tablet 3/6/2019 at Unknown time  Yes Yes   Sig: Take 81 mg by mouth daily   atorvastatin (LIPITOR) 10 mg tablet 3/6/2019 at Unknown time  Yes Yes   Sig: Take 10 mg by mouth daily   bisacodyl (DULCOLAX) 5 mg EC tablet Unknown at Unknown time  Yes No   Sig: Take 10 mg by mouth daily as needed for constipation   butalbital-acetaminophen-caffeine-codeine (FIORICET WITH CODEINE) -38-30 MG per capsule Unknown at Unknown time  Yes No   Sig: Take 1 capsule by mouth every 4 (four) hours as needed for headaches   dimethicone 1 % cream Unknown at Unknown time  Yes No   Sig: Apply topically 2 (two) times a day as needed for dry skin   famotidine (PEPCID) 20 mg tablet 3/6/2019 at Unknown time  Yes Yes   Sig: Take 20 mg by mouth 2 (two) times a day   ferrous sulfate 325 (65 FE) MG EC tablet 3/6/2019 at Unknown time  Yes Yes   Sig: Take 325 mg by mouth 3 (three) times a day with meals   gabapentin (NEURONTIN) 100 mg capsule 3/6/2019 at Unknown time  Yes Yes   Sig: Take 100 mg by mouth 3 (three) times a day    glucagon (GLUCAGON EMERGENCY) 1 MG injection Unknown at Unknown time  Yes No   Sig: Inject 1 mg under the skin once as needed for low blood sugar   insulin aspart (NovoLOG) 100 units/mL injection 3/6/2019 at Unknown time  Yes Yes   Sig: Inject under the skin 3 (three) times a day before meals   insulin glargine (LANTUS) 100 units/mL subcutaneous injection 3/6/2019 at Unknown time  Yes Yes   Sig: Inject 50 Units under the skin daily at bedtime    isosorbide dinitrate (ISORDIL) 30 mg tablet 3/6/2019 at Unknown time  Yes Yes   Sig: Take 30 mg by mouth 4 (four) times a day   magnesium hydroxide (MILK OF MAGNESIA) 400 mg/5 mL oral suspension Unknown at Unknown time  Yes No   Sig: Take 15 mL by mouth daily as needed for constipation   megestrol (MEGACE) 40 MG/ML suspension 3/6/2019 at Unknown time  Yes Yes   Sig: Take 20 mL by mouth daily    menthol-zinc oxide (CALMOSEPTINE) 0 44-20 6 % OINT Unknown at Unknown time  Yes No   Sig: Apply topically 2 (two) times a day   metFORMIN (GLUCOPHAGE) 500 mg tablet 3/6/2019 at Unknown time  Yes Yes   Sig: Take 500 mg by mouth 2 (two) times a day with meals   metoprolol tartrate (LOPRESSOR) 25 mg tablet 3/6/2019 at Unknown time  Yes Yes   Sig: Take 25 mg by mouth every 12 (twelve) hours   multivitamin (THERAGRAN) TABS 3/6/2019 at Unknown time  Yes Yes   Sig: Take 1 tablet by mouth daily   senna (SENOKOT) 8 6 MG tablet 3/6/2019 at Unknown time  Yes Yes   Sig: Take 1 tablet by mouth daily   sertraline (ZOLOFT) 50 mg tablet 3/6/2019 at Unknown time  Yes Yes   Sig: Take 50 mg by mouth daily   tiotropium (SPIRIVA) 18 mcg inhalation capsule 3/6/2019 at Unknown time  Yes Yes   Sig: Place 18 mcg into inhaler and inhale daily      Facility-Administered Medications: None       Past Medical History:   Diagnosis Date    Anemia     [facility]     COPD (chronic obstructive pulmonary disease) ([facility])     home 02 2l    [facility] mellitus ([facility] [address] )     DJD (degenerative joint disease)     Gait abnormality     GERD (gastroesophageal reflux disease)     Hyperlipidemia     Hypertension     Stroke [facility])        Past Surgical History:   Procedure Laterality Date    ABDOMINAL SURGERY         Family History   Family history unknown: Yes     I have reviewed and agree with the history as documented  Social History     Tobacco Use    Smoking status: Never Smoker    Smokeless tobacco: Never Used   Substance Use Topics    Alcohol use: Never     Frequency: Never    Drug use: Never        Review of Systems   Unable to perform ROS: Mental status change   Respiratory: Positive for shortness of breath  Physical Exam  Physical Exam   Constitutional: She appears well-developed  She appears lethargic  She appears distressed (Moderate respiratory distress)  HENT:   Nose: Nose normal    Mouth/Throat: Oropharynx is clear and moist  No oropharyngeal exudate  Eyes: Pupils are equal, round, and reactive to light  Conjunctivae and EOM are normal  No scleral icterus  Neck: Normal range of motion  Neck supple  No JVD present  No tracheal deviation present     Cardiovascular: Normal rate, regular rhythm and normal heart sounds  No murmur heard  Pulmonary/Chest: Tachypnea noted  She is in respiratory distress  She has no wheezes  She has rhonchi ( bilateral)  She has no rales  Abdominal: Soft  Bowel sounds are normal  There is no tenderness  There is no guarding  Musculoskeletal: Normal range of motion  She exhibits no edema or tenderness  Neurological: She appears lethargic  Patient is lethargic, but is arousable to painful stimuli and is pulling on BiPAP at bedside  Patient moves bilateral upper extremities   Skin: Skin is warm and dry  Psychiatric: She has a normal mood and affect  Her behavior is normal    Nursing note and vitals reviewed        Vital Signs  ED Triage Vitals   Temperature Pulse Respirations Blood Pressure SpO2   03/07/19 0805 03/07/19 0805 03/07/19 0805 03/07/19 0805 03/07/19 0805   (!) 96 8 °F (36 °C) 82 (!) 24 (!) 197/86 (!) 83 %      Temp Source Heart Rate Source Patient Position - Orthostatic VS BP Location FiO2 (%)   03/07/19 0805 03/07/19 0805 03/07/19 0805 03/07/19 0805 03/07/19 0812   Temporal Monitor Sitting Right arm 100      Pain Score       03/07/19 0805       No Pain           Vitals:    03/07/19 1010 03/07/19 1015 03/07/19 1030 03/07/19 1325   BP: 138/82 127/75     Pulse: 102 102 102 84   Patient Position - Orthostatic VS: Lying Lying         Visual Acuity      ED Medications  Medications   sodium chloride (PF) 0 9 % injection 3 mL (has no administration in time range)   aspirin (ECOTRIN LOW STRENGTH) EC tablet 81 mg (81 mg Oral Not Given 3/7/19 1219)   atorvastatin (LIPITOR) tablet 10 mg (10 mg Oral Not Given 3/7/19 1219)   insulin glargine (LANTUS) subcutaneous injection 20 Units 0 2 mL (has no administration in time range)   metoprolol tartrate (LOPRESSOR) tablet 25 mg (25 mg Oral Not Given 3/7/19 1220)   sertraline (ZOLOFT) tablet 50 mg (50 mg Oral Not Given 3/7/19 1220)   bisacodyl (DULCOLAX) EC tablet 10 mg (has no administration in time range)   ipratropium-albuterol (DUO-NEB) 0 5-2 5 mg/3 mL inhalation solution 3 mL (3 mL Nebulization Given 3/7/19 1329)   enoxaparin (LOVENOX) subcutaneous injection 30 mg (has no administration in time range)   cefepime (MAXIPIME) IVPB (premix) 2,000 mg (has no administration in time range)   metroNIDAZOLE (FLAGYL) IVPB (premix) 500 mg (has no administration in time range)   insulin lispro (HumaLOG) 100 units/mL subcutaneous injection 1-5 Units (1 Units Subcutaneous Not Given 3/7/19 1219)   insulin lispro (HumaLOG) 100 units/mL subcutaneous injection 1-5 Units (has no administration in time range)   vancomycin (VANCOCIN) 750 mg in sodium chloride 0 9 % 250 mL IV piggyback (has no administration in time range)   vancomycin (VANCOCIN) 1,250 mg in sodium chloride 0 9 % 250 mL IVPB (1,250 mg Intravenous New Bag 3/7/19 0932)   piperacillin-tazobactam (ZOSYN) 4 5 g in sodium chloride 0 9 % 100 mL IVPB (0 g Intravenous Stopped 3/7/19 0931)   sodium chloride 0 9 % bolus 1,000 mL (0 mL Intravenous Stopped 3/7/19 0943)       Diagnostic Studies  Results Reviewed     Procedure Component Value Units Date/Time    Procalcitonin [[id number]]  (Abnormal) Collected:  03/07/19 0814    Lab Status:  Final result Specimen:  Blood from Arm, Left Updated:  03/07/19 1319     Procalcitonin 0 58 ng/ml     Troponin I [[id number]]  (Abnormal) Collected:  03/07/19 1205    Lab Status:  Final result Specimen:  Blood from Arm, Left Updated:  03/07/19 1256     Troponin I 0 05 ng/mL     Troponin I [[id number]]     Lab Status:  No result Specimen:  Blood     Lactic Acid x2 [[id number]] Collected:  03/07/19 1154    Lab Status:  No result Specimen:  Blood from Arm, Left     Blood gas, arterial [[id number]]  (Abnormal) Collected:  03/07/19 1002    Lab Status:  Final result Specimen:  Blood, Arterial from Brachial, Right Updated:  03/07/19 1022     pH, Arterial 7 230     pCO2, Arterial 42 7 mm Hg      pO2, Arterial 101 0 mm Hg      HCO3, Arterial 17 3 mmol/L      Base Excess, Arterial -9 5 mmol/L O2 Content, Arterial 17 8 mL/dL      O2 HGB,Arterial  95 7 %      SOURCE Brachial, Right     BETHANY TEST Yes     Non Vent type BIPAP BIPAP     IPAP 12     EPAP 6     BIPAP fio2 100 %     Urine Microscopic [[id number]]  (Abnormal) Collected:  03/07/19 0835    Lab Status:  Final result Specimen:  Urine, Indwelling Ott Catheter Updated:  03/07/19 0932     RBC, UA 1-2 /hpf      WBC, UA 4-10 /hpf      Epithelial Cells Occasional /hpf      Bacteria, UA Occasional /hpf      OTHER OBSERVATIONS Yeast Cells Present    UA w Reflex to Microscopic w Reflex to Culture [[id number]]  (Abnormal) Collected:  03/07/19 0835    Lab Status:  Final result Specimen:  Urine, Indwelling Ott Catheter Updated:  03/07/19 0931     Color, UA Yellow     Clarity, UA Clear     Specific Gravity, UA 1 010     pH, UA 5 5     Leukocytes, UA 1+     Nitrite, UA Negative     Protein, UA Negative mg/dl      Glucose, UA Negative mg/dl      Ketones, UA Negative mg/dl      Urobilinogen, UA 0 2 E U /dl      Bilirubin, UA Negative     Blood, UA 2+    B-Type Natriuretic Peptide [facility] ONLY) [[id number]]  (Abnormal) Collected:  03/07/19 0814    Lab Status:  Final result Specimen:  Blood from Arm, Left Updated:  03/07/19 0855      pg/mL     CK Total with Reflex CKMB [[id number]]  (Abnormal) Collected:  03/07/19 0814    Lab Status:  Final result Specimen:  Blood from Arm, Left Updated:  03/07/19 0855     Total CK 20 U/L     Comprehensive metabolic panel [[id number]]  (Abnormal) Collected:  03/07/19 0814    Lab Status:  Final result Specimen:  Blood from Arm, Left Updated:  03/07/19 0855     Sodium 135 mmol/L      Potassium 4 4 mmol/L      Chloride 104 mmol/L      CO2 24 mmol/L      ANION GAP 7 mmol/L      BUN 27 mg/dL      Creatinine 1 14 mg/dL      Glucose 147 mg/dL      Calcium 9 4 mg/dL      AST 17 U/L      ALT 13 U/L      Alkaline Phosphatase 67 U/L      Total Protein 6 9 g/dL      Albumin 3 5 g/dL      Total Bilirubin 0 40 mg/dL      eGFR 55 ml/min/1 73sq m     Narrative:       National Kid[employer] Disease Education Program recommendations are as follows:  GFR calculation is accurate only with a steady state creatinine  Chronic Kidney disease less than 60 ml/min/1 73 sq  meters  Kidney failure less than 15 ml/min/1 73 sq  meters  Lactic Acid x2 [[id number]]  (Normal) Collected:  03/07/19 0814    Lab Status:  Final result Specimen:  Blood from Arm, Left Updated:  03/07/19 0848     LACTIC ACID 1 4 mmol/L     Narrative:       Result may be elevated if tourniquet was used during collection  Troponin I [[id number]]  (Normal) Collected:  03/07/19 0814    Lab Status:  Final result Specimen:  Blood from Arm, Left Updated:  03/07/19 0847     Troponin I 0 03 ng/mL     CBC and differential [[id number]]  (Abnormal) Collected:  03/07/19 0814    Lab Status:  Final result Specimen:  Blood from Arm, Left Updated:  03/07/19 0845     WBC 12 10 Thousand/uL      RBC 3 41 Million/uL      Hemoglobin 11 7 g/dL      Hematocrit 33 9 %       fL      MCH 34 4 pg      MCHC 34 5 g/dL      RDW 15 7 %      MPV 9 5 fL      Platelets 764 Thousands/uL      nRBC 0 /100 WBCs      Neutrophils Relative 76 %      Lymphocytes Relative 15 %      Monocytes Relative 6 %      Eosinophils Relative 2 %      Basophils Relative 1 %      Neutrophils Absolute 9 20 Thousands/µL      Lymphocytes Absolute 1 80 Thousands/µL      Monocytes Absolute 0 70 Thousand/µL      Eosinophils Absolute 0 20 Thousand/µL      Basophils Absolute 0 10 Thousands/µL     Protime-INR [[id number]]  (Normal) Collected:  03/07/19 0814    Lab Status:  Final result Specimen:  Blood from Arm, Left Updated:  03/07/19 0841     Protime 12 5 seconds      INR 1 08    APTT [[id number]]  (Normal) Collected:  03/07/19 0814    Lab Status:  Final result Specimen:  Blood from Arm, Left Updated:  03/07/19 0841     PTT 26 seconds     Blood culture #1 [[id number]] Collected:  03/07/19 0815    Lab Status:   In process Specimen:  Blood from Arm, Left Updated:  03/07/19 0840    Blood culture #2 [[id number]] Collected:  03/07/19 0814    Lab Status: In process Specimen:  Blood from Arm, Left Updated:  03/07/19 0840    Fingerstick Glucose (POCT) [[id number]]  (Abnormal) Collected:  03/07/19 0839    Lab Status:  Final result Updated:  03/07/19 0840     POC Glucose 152 mg/dl     Blood gas, arterial [[id number]]  (Abnormal) Collected:  03/07/19 0818    Lab Status:  Final result Specimen:  Blood, Arterial from Brachial, Left Updated:  03/07/19 0832     pH, Arterial 7 240     pCO2, Arterial 57 8 mm Hg      pO2, Arterial 104 0 mm Hg      HCO3, Arterial 23 9 mmol/L      Base Excess, Arterial -3 7 mmol/L      O2 Content, Arterial 15 6 mL/dL      O2 HGB,Arterial  95 6 %      SOURCE Brachial, Left     BETHANY TEST Yes     Non Vent type BIPAP BIPAP     IPAP 12     EPAP 6     BIPAP fio2 100 %                  CT head without contrast   Final Result by Kamryn Can MD (03/07 1041)   No acute intracranial hemorrhage seen      No new hypodense area seen within the brain parenchyma to suggest territorial infarct      Bilateral chronic deep infarcts in the basal ganglionic region, chronic right cerebellar infarct      Moderate to periventricular and white matter hypodensity related to chronic small vessel ischemic changes      Soft tissue density seen in the left external auditory canal with complete opacification and without any erosive changes  Suggest direct visual inspection and clinical evaluation for definite characterization  This is unchanged, statistically this may    be due to cerumen  Mild soft tissue density seen within the right effusion noted          I personally discussed this study with Mukesh Mcgarry on 3/7/2019 at 9:58 AM                 Workstation performed: [id number]         XR chest 1 view portable   Final Result by Alexei Galindo MD (03/07 3452)      Chronic vague left lower lobe opacity with adjacent pleural thickening  Workstation performed: [id number]                    Procedures  ECG 12 Lead Documentation  Date/Time: 3/7/2019 8:16 AM  Performed by: Anuradha Lambert DO  Authorized by: Anuradha Lambert DO     Indications / Diagnosis:  Shortness of breath altered mental status  ECG reviewed by me, the ED Provider: yes    Patient location:  ED  Previous ECG:     Previous ECG:  Compared to current    Comparison ECG info:  2/14/19    Similarity:  No change    Comparison to cardiac monitor: Yes    Interpretation:     Interpretation: abnormal    Rate:     ECG rate:  92 BPM    ECG rate assessment: normal    Rhythm:     Rhythm: sinus rhythm    Ectopy:     Ectopy: none    QRS:     QRS axis:  Normal    QRS intervals:  Normal  Conduction:     Conduction: abnormal      Abnormal conduction comment:  Accelerated junctional rhythm  ST segments:     ST segments:  Non-specific  T waves:     T waves: inverted      Inverted:  I and aVL           Phone Contacts  ED Phone Contact    ED Course  ED Course as of Mar 07 1449   Thu Mar 07, 2019   0805 Patient seen immediately on arrival secondary to clinical condition  Patient saturating low 80s on CPAP  Patient placed on BiPAP with increase in O2 saturation to 100%  Patient with nursing facility paperwork which indicate that patient's resuscitation status is do not intubate but would want CPR/resuscitation  Labs, imaging and EKG ordered  Vancomycin and Zosyn ordered to cover hospital-acquired pneumonia  36 Contacted patient's daughter (who is power-of-) and case discussed  Confirmed patient's do not intubate status  Requests I speak with patient's son for further recommendations  0830 ABG reviewed, pH 7 24, pCO2 57 8  For now will keep patient on BiPAP and reassess now that patient is on BiPAP  Plan to initiate NS bolus 1000 cc however will hold off on full 30 cc/kg as patient with history of CHF  Will continue to monitor        200 Contacted by patient's son and case discussed in detail  Patient's son and daughter are in agreement that patient will remain DNI  If patient were to code would do course of chemical resuscitation      0901 Dr Trinidad Dancer contacted and case discussed  Will obtain CT head and then repeat ABG prior to disposition to ICU       1002 Looked in patient's ear patient with large amount of cerumen in left ear  MDM    Disposition  Final diagnoses: Altered mental status, unspecified altered mental status type   COPD exacerbation ([facility] [address] )   Sepsis, due to unspecified organism [facility])   Hypoxia     Time reflects when diagnosis was documented in both MDM as applicable and the Disposition within this note     Time User Action Codes Description Comment    3/7/2019  8:45 AM Antonella Tom Add [J98 01] Bronchospasm     3/7/2019  9:04 AM Carlos Arreguin Add [R41 82] Altered mental status, unspecified altered mental status type     3/7/2019  9:04 AM Salima Arreguin Add [J44 1] COPD exacerbation ([facility] [address] )     3/7/2019  9:04 AM Carlos Arreguin Add [A41 9] Sepsis, due to unspecified organism ([facility] [address] )     3/7/2019  9:04 AM Yoana Vivas Add [R09 02] Hypoxia     3/7/2019 11:54 AM Angel Blare Add [J96 21,  J96 22] Acute on chronic respiratory failure with hypoxia and hypercapnia ([facility] [address] )     3/7/2019 11:54 AM Angel Blare Add [A41 9,  R65 20] Severe sepsis [facility])       ED Disposition     ED Disposition Condition Date/Time Comment    Admit Stable Thu Mar 7, 2019  9:04 AM Case was discussed with Dr Trinidad Dancer and the patient's admission status was agreed to be Admission Status: inpatient status to the service of Dr Trinidad Dancer           Follow-up Information    None         Current Discharge Medication List      CONTINUE these medications which have NOT CHANGED    Details   acetaminophen (TYLENOL) 325 mg tablet Take 650 mg by mouth every 6 (six) hours as needed for mild pain      amLODIPine (NORVASC) 10 mg tablet Take 10 mg by mouth daily      aspirin (ECOTRIN LOW STRENGTH) 81 mg EC tablet Take 81 mg by mouth daily      atorvastatin (LIPITOR) 10 mg tablet Take 10 mg by mouth daily      famotidine (PEPCID) 20 mg tablet Take 20 mg by mouth 2 (two) times a day      ferrous sulfate 325 (65 FE) MG EC tablet Take 325 mg by mouth 3 (three) times a day with meals      gabapentin (NEURONTIN) 100 mg capsule Take 100 mg by mouth 3 (three) times a day       insulin aspart (NovoLOG) 100 units/mL injection Inject under the skin 3 (three) times a day before meals      insulin glargine (LANTUS) 100 units/mL subcutaneous injection Inject 50 Units under the skin daily at bedtime       isosorbide dinitrate (ISORDIL) 30 mg tablet Take 30 mg by mouth 4 (four) times a day      megestrol (MEGACE) 40 MG/ML suspension Take 20 mL by mouth daily       metFORMIN (GLUCOPHAGE) 500 mg tablet Take 500 mg by mouth 2 (two) times a day with meals      metoprolol tartrate (LOPRESSOR) 25 mg tablet Take 25 mg by mouth every 12 (twelve) hours      multivitamin (THERAGRAN) TABS Take 1 tablet by mouth daily      senna (SENOKOT) 8 6 MG tablet Take 1 tablet by mouth daily      sertraline (ZOLOFT) 50 mg tablet Take 50 mg by mouth daily      tiotropium (SPIRIVA) 18 mcg inhalation capsule Place 18 mcg into inhaler and inhale daily      albuterol (2 5 mg/3 mL) 0 083 % nebulizer solution Take 2 5 mg by nebulization every 6 (six) hours as needed for wheezing or shortness of breath      ammonium lactate (LAC-HYDRIN) 12 % cream Apply topically as needed for dry skin      bisacodyl (DULCOLAX) 5 mg EC tablet Take 10 mg by mouth daily as needed for constipation      butalbital-acetaminophen-caffeine-codeine (FIORICET WITH CODEINE) -56-30 MG per capsule Take 1 capsule by mouth every 4 (four) hours as needed for headaches      dimethicone 1 % cream Apply topically 2 (two) times a day as needed for dry skin      glucagon (GLUCAGON EMERGENCY) 1 MG injection Inject 1 mg under the skin once as needed for low blood sugar      LORazepam (ATIVAN) 0 5 mg tablet Take by mouth every 8 (eight) hours as needed for anxiety      magnesium hydroxide (MILK OF MAGNESIA) 400 mg/5 mL oral suspension Take 15 mL by mouth daily as needed for constipation      menthol-zinc oxide (CALMOSEPTINE) 0 44-20 6 % OINT Apply topically 2 (two) times a day           No discharge procedures on file      ED Provider  Electronically Signed by           Kerney Sandhoff, DO  03/07/19 1178

## 2019-03-07 NOTE — ED NOTES
Returned from CT scan dept via litter with this RN and resp therapist  Pt temp removed from bipap for transport from CT dept to ER 5 (pt was placed on NRB mask at 15LPM for transport)  Bipap reconnected upon return to ER 5  Pt suctioned for thick white secretions upon return from CT dept        Jaspreet Bryant RN  03/07/19 8818

## 2019-03-07 NOTE — QUICK NOTE
Spoke with daughter over the phone to update her regarding her mother status  We spoke about her hypotension, resuscitation with IV fluids, and plan for CT of chest abdomen pelvis to further evaluate source of infection  She confirmed what her brother had mentioned earlier regarding the patient's code status as DNR/DNI  They would like to pursue aggressive treatment for the next 24-48 hours before considering hospice

## 2019-03-07 NOTE — H&P
H&P- Howard Bejarano 1940, 66 y o  female MRN: [id number]    Unit/Bed#: ICU 03 Encounter: [id number]    Primary Care Provider: Baljit Khan DO   Date and time admitted to hospital: 3/7/2019  8:03 AM        * Severe sepsis ([address] )  Assessment & Plan  · Severe sepsis secondary to aspiration pneumonia  · We will treat with broad-spectrum antibiotics, namely vancomycin, cefepime and Flagyl  · Monitor intensive care unit  · Family would like to pursue aggressive medical therapies, however, they have elected to make the patient DNR/DNI  · If no clinical improvement next 24-48 hours, they would like to consider hospice  · Consul critical care    Acute on chronic respiratory failure with hypoxia and hypercapnia (HCC)  Assessment & Plan  · Secondary to aspiration pneumonia  · On BiPAP currently  · DNR/DNI  · Monitor in ICU    Other dysphagia  Assessment & Plan  · Secondary to history of CVA  · NPO for now  · I did discuss the possibility of PEG tube during the previous admission and this time as well, and family preferred not to have PEG tube placed  If no clinical improvement next been for 48 hours, they would like to consider hospice    Pressure ulcer of left buttock, stage 2  Assessment & Plan  · Present on admission  · Wound care    Type 2 diabetes mellitus with complication, without long-term current use of insulin [facility])  Assessment & Plan  No results found for: HGBA1C    Recent Labs     03/07/19  0839 03/07/19  1140   POCGLU 152* 145*       Blood Sugar Average: Last 72 hrs:  (P) 148  5     We will give half dose Lantus while patient is NPO along with sliding scale    VTE Prophylaxis: Enoxaparin (Lovenox)  Code Status: dnr/dni  POLST: POLST is not applicable to this patient  Discussion with family:prem isaac over the phone    Anticipated Length of Stay:  Patient will be admitted on an Inpatient basis with an anticipated length of stay of  > 2 midnights     Justification for Hospital Stay: respiratory failure    Total Time for Visit, including Counseling / Coordination of Care: 1 hour  Greater than 50% of this total time spent on direct patient counseling and coordination of care  Chief Complaint:   Altered mental status    History of Present Illness:    Swapna Oglesby is a 66 y o  female who presents with altered mental status  Patient has a history of CVA, and associated dysphagia as a result, with recurrent aspiration pneumonias who is presenting from her skilled nursing facility with altered mental status and tachypnea  Given the patient's mental status change and aphasia, history was obtained from emergency room physician, medical chart, and the patient's family  Patient was recently hospitalized for aspiration pneumonia, UTI, E coli bacteremia and treated with a 7 day course of cefepime at that time  She was discharged back to her skilled nursing facility and was doing well until yesterday evening and this morning when she was noted to be altered  Upon my examination, patient is resting comfortably on BiPAP  In reviewing the patient's chart, it appears that patient's respiratory distress and sepsis is likely related to a recurrent aspiration  This unfortunately was addressed during the previous admission, and the patient's daughter was given the choice of a PEG tube for nutrition vs hospice  They preferred to defer making a decision at that time      Review of Systems:  Review of Systems   Unable to perform ROS: Mental status change       Past Medical and Surgical History:   Past Medical History:   Diagnosis Date    Anemia     [facility]est[facility]     COPD (chronic obstructive pulmonary disease) ([facility] )     home [address] 2[address]    Diabetes mellitus ([facility] )     DJD (degenerative joint disease)     Gait abnormality     GERD (gastroesophageal reflux disease)     Hyperlipidemia     Hypertension     Stroke [facility])        Past Surgical History:   Procedure Laterality Date    ABDOMINAL SURGERY         Meds/Allergies:  Prior to Admission medications    Medication Sig Start Date End Date Taking?  Authorizing Provider   acetaminophen (TYLENOL) 325 mg tablet Take 650 mg by mouth every 6 (six) hours as needed for mild pain   Yes Historical Provider, MD   amLODIPine (NORVASC) 10 mg tablet Take 10 mg by mouth daily   Yes Historical Provider, MD   aspirin (ECOTRIN LOW STRENGTH) 81 mg EC tablet Take 81 mg by mouth daily   Yes Historical Provider, MD   atorvastatin (LIPITOR) 10 mg tablet Take 10 mg by mouth daily   Yes Historical Provider, MD   famotidine (PEPCID) 20 mg tablet Take 20 mg by mouth 2 (two) times a day   Yes Historical Provider, MD   ferrous sulfate 325 (65 FE) MG EC tablet Take 325 mg by mouth 3 (three) times a day with meals   Yes Historical Provider, MD   gabapentin (NEURONTIN) 100 mg capsule Take 100 mg by mouth 3 (three) times a day    Yes Historical Provider, MD   insulin aspart (NovoLOG) 100 units/mL injection Inject under the skin 3 (three) times a day before meals   Yes Historical Provider, MD   insulin glargine (LANTUS) 100 units/mL subcutaneous injection Inject 50 Units under the skin daily at bedtime    Yes Historical Provider, MD   isosorbide dinitrate (ISORDIL) 30 mg tablet Take 30 mg by mouth 4 (four) times a day   Yes Historical Provider, MD   megestrol (MEGACE) 40 MG/ML suspension Take 20 mL by mouth daily    Yes Historical Provider, MD   metFORMIN (GLUCOPHAGE) 500 mg tablet Take 500 mg by mouth 2 (two) times a day with meals   Yes Historical Provider, MD   metoprolol tartrate (LOPRESSOR) 25 mg tablet Take 25 mg by mouth every 12 (twelve) hours   Yes Historical Provider, MD   multivitamin (THERAGRAN) TABS Take 1 tablet by mouth daily   Yes Historical Provider, MD   senna (SENOKOT) 8 6 MG tablet Take 1 tablet by mouth daily   Yes Historical Provider, MD   sertraline (ZOLOFT) 50 mg tablet Take 50 mg by mouth daily   Yes Historical Provider, MD   tiotropium (SPIRIVA) 18 mcg inhalation capsule Place 18 mcg into inhaler and inhale daily   Yes Historical Provider, MD   albuterol (2 5 mg/3 mL) 0 083 % nebulizer solution Take 2 5 mg by nebulization every 6 (six) hours as needed for wheezing or shortness of breath    Historical Provider, MD   ammonium lactate (LAC-HYDRIN) 12 % cream Apply topically as needed for dry skin    Historical Provider, MD   bisacodyl (DULCOLAX) 5 mg EC tablet Take 10 mg by mouth daily as needed for constipation    Historical Provider, MD   butalbital-acetaminophen-caffeine-codeine (FIORICET WITH CODEINE) -26-30 MG per capsule Take 1 capsule by mouth every 4 (four) hours as needed for headaches    Historical Provider, MD   dimethicone 1 % cream Apply topically 2 (two) times a day as needed for dry skin    Historical Provider, MD   glucagon (GLUCAGON EMERGENCY) 1 MG injection Inject 1 mg under the skin once as needed for low blood sugar    Historical Provider, MD   LORazepam (ATIVAN) 0 5 mg tablet Take by mouth every 8 (eight) hours as needed for anxiety    Historical Provider, MD   magnesium hydroxide (MILK OF MAGNESIA) 400 mg/5 mL oral suspension Take 15 mL by mouth daily as needed for constipation    Historical Provider, MD   menthol-zinc oxide (CALMOSEPTINE) 0 44-20 6 % OINT Apply topically 2 (two) times a day    Historical Provider, MD     I have reviewed home medications with patient personally  Allergies:    Allergies   Allergen Reactions    Codeine     Demerol [Meperidine]     Nsaids     Protonix [Pantoprazole]        Social History:  Marital Status: Unknown   Occupation: n/a  Patient Pre-hospital Living Situation: snf  Patient Pre-hospital Level of Mobility: limited  Patient Pre-hospital Diet Restrictions: diabetic  Substance Use History:     Social History     Substance and Sexual Activity   Alcohol Use Never    Frequency: Never     Social History     Tobacco Use   Smoking Status Never Smoker   Smokeless Tobacco Never Used     Social History Substance and Sexual Activity   Drug Use Never       Family History:  I have reviewed the patients family history    Physical Exam:   Vitals:   Blood Pressure: 127/75 (03/07/19 1015)  Pulse: 102 (03/07/19 1030)  Temperature: (!) 96 4 °F (35 8 °C) (03/07/19 1010)  Temp Source: Temporal (03/07/19 1010)  Respirations: (!) 37 (03/07/19 1030)  Height: 5' 6" (167 6 cm) (03/07/19 1030)  Weight - Scale: 82 8 kg (182 lb 8 oz) (03/07/19 1030)  SpO2: (!) 81 % (03/07/19 1030)    Physical Exam   Constitutional: She appears well-developed  She appears distressed  HENT:   Head: Normocephalic and atraumatic  Eyes: Pupils are equal, round, and reactive to light  EOM are normal    Neck: Normal range of motion  Neck supple  Cardiovascular: Normal rate and regular rhythm  Pulmonary/Chest: She is in respiratory distress  She has rales  Musculoskeletal: Normal range of motion  Skin: Skin is warm  Sacral/buttocks ulcer noted, poa, unstable   Psychiatric:   Unable to assess   Nursing note and vitals reviewed  Additional Data:   Lab Results: I have personally reviewed pertinent reports  Results from last 7 days   Lab Units 03/07/19  0814   WBC Thousand/uL 12 10*   HEMOGLOBIN g/dL 11 7*   HEMATOCRIT % 33 9*   PLATELETS Thousands/uL 208   NEUTROS PCT % 76*   LYMPHS PCT % 15*   MONOS PCT % 6   EOS PCT % 2     Results from last 7 days   Lab Units 03/07/19  0814   POTASSIUM mmol/L 4 4   CHLORIDE mmol/L 104   CO2 mmol/L 24   BUN mg/dL 27*   CREATININE mg/dL 1 14   CALCIUM mg/dL 9 4   ALK PHOS U/L 67   ALT U/L 13   AST U/L 17     Results from last 7 days   Lab Units 03/07/19  0814   INR  1 08     Results from last 7 days   Lab Units 03/07/19  1140 03/07/19  0839   POC GLUCOSE mg/dl 145* 152*           Imaging: I have personally reviewed pertinent reports      CT head without contrast   Final Result by Alejandro Mckeon MD (03/07 3588)   No acute intracranial hemorrhage seen      No new hypodense area seen within the brain parenchyma to suggest territorial infarct      Bilateral chronic deep infarcts in the basal ganglionic region, chronic right cerebellar infarct      Moderate to periventricular and white matter hypodensity related to chronic small vessel ischemic changes      Soft tissue density seen in the left external auditory canal with complete opacification and without any erosive changes  Suggest direct visual inspection and clinical evaluation for definite characterization  This is unchanged, statistically this may    be due to cerumen  Mild soft tissue density seen within the right effusion noted          I personally discussed this study with Ahmet Ta on 3/7/2019 at 9:58 AM                 Workstation performed: [id number]         XR chest 1 view portable   Final Result by Tello Donnelly MD (03/07 1134)      Chronic vague left lower lobe opacity with adjacent pleural thickening  Workstation performed: [id number]             EKG, Pathology, and Other Studies Reviewed on Admission:   · EKG: n/a    NetAccess/Epic Records Reviewed: Yes     ** Please Note: This note has been constructed using a voice recognition system   **

## 2019-03-08 ENCOUNTER — APPOINTMENT (INPATIENT)
Dept: RADIOLOGY | Facility: HOSPITAL | Age: 79
DRG: 871 | End: 2019-03-08
Payer: MEDICARE

## 2019-03-08 PROBLEM — K52.9 COLITIS: Status: ACTIVE | Noted: 2019-03-08

## 2019-03-08 LAB
ALBUMIN SERPL BCP-MCNC: 2.8 G/DL (ref 3.5–5.7)
ALP SERPL-CCNC: 47 U/L (ref 55–165)
ALT SERPL W P-5'-P-CCNC: 13 U/L (ref 7–52)
ANION GAP SERPL CALCULATED.3IONS-SCNC: 11 MMOL/L (ref 4–13)
ANION GAP SERPL CALCULATED.3IONS-SCNC: 9 MMOL/L (ref 4–13)
ARTERIAL PATENCY WRIST A: YES
AST SERPL W P-5'-P-CCNC: 21 U/L (ref 13–39)
BASE EXCESS BLDA CALC-SCNC: -8.3 MMOL/L (ref -2–3)
BILIRUB SERPL-MCNC: 0.4 MG/DL (ref 0.2–1)
BUN SERPL-MCNC: 39 MG/DL (ref 7–25)
BUN SERPL-MCNC: 41 MG/DL (ref 7–25)
CALCIUM SERPL-MCNC: 8.3 MG/DL (ref 8.6–10.5)
CALCIUM SERPL-MCNC: 8.4 MG/DL (ref 8.6–10.5)
CHLORIDE SERPL-SCNC: 111 MMOL/L (ref 98–107)
CHLORIDE SERPL-SCNC: 113 MMOL/L (ref 98–107)
CO2 SERPL-SCNC: 17 MMOL/L (ref 21–31)
CO2 SERPL-SCNC: 18 MMOL/L (ref 21–31)
CREAT SERPL-MCNC: 1.82 MG/DL (ref 0.6–1.2)
CREAT SERPL-MCNC: 1.87 MG/DL (ref 0.6–1.2)
ERYTHROCYTE [DISTWIDTH] IN BLOOD BY AUTOMATED COUNT: 16 % (ref 11.5–14.5)
GFR SERPL CREATININE-BSD FRML MDRD: 25 ML/MIN/1.73SQ M
GFR SERPL CREATININE-BSD FRML MDRD: 26 ML/MIN/1.73SQ M
GLUCOSE SERPL-MCNC: 138 MG/DL (ref 65–140)
GLUCOSE SERPL-MCNC: 186 MG/DL (ref 65–99)
GLUCOSE SERPL-MCNC: 187 MG/DL (ref 65–140)
GLUCOSE SERPL-MCNC: 188 MG/DL (ref 65–140)
GLUCOSE SERPL-MCNC: 191 MG/DL (ref 65–140)
GLUCOSE SERPL-MCNC: 193 MG/DL (ref 65–140)
GLUCOSE SERPL-MCNC: 216 MG/DL (ref 65–140)
GLUCOSE SERPL-MCNC: 222 MG/DL (ref 65–99)
HCO3 BLDA-SCNC: 17.3 MMOL/L (ref 22–28)
HCT VFR BLD AUTO: 32.7 % (ref 42–47)
HGB BLD-MCNC: 10.9 G/DL (ref 12–16)
LACTATE SERPL-SCNC: 2.1 MMOL/L (ref 0.5–2)
MCH RBC QN AUTO: 33.9 PG (ref 26–34)
MCHC RBC AUTO-ENTMCNC: 33.5 G/DL (ref 31–37)
MCV RBC AUTO: 101 FL (ref 81–99)
O2 CT BLDA-SCNC: 15.7 ML/DL
OXYHGB MFR BLDA: 96 % (ref 94–100)
PCO2 BLDA: 37.8 MM HG (ref 35–45)
PH BLDA: 7.28 [PH] (ref 7.35–7.45)
PLATELET # BLD AUTO: 182 THOUSANDS/UL (ref 149–390)
PMV BLD AUTO: 9.6 FL (ref 8.6–11.7)
PO2 BLDA: 99 MM HG (ref 80–100)
POTASSIUM SERPL-SCNC: 4.4 MMOL/L (ref 3.5–5.5)
POTASSIUM SERPL-SCNC: 4.7 MMOL/L (ref 3.5–5.5)
PROCALCITONIN SERPL-MCNC: 66.81 NG/ML
PROT SERPL-MCNC: 5.5 G/DL (ref 6.4–8.9)
RBC # BLD AUTO: 3.22 MILLION/UL (ref 3.9–5.2)
SODIUM SERPL-SCNC: 139 MMOL/L (ref 134–143)
SODIUM SERPL-SCNC: 140 MMOL/L (ref 134–143)
SPECIMEN SOURCE: ABNORMAL
VANCOMYCIN TROUGH SERPL-MCNC: 20.7 UG/ML (ref 5–12)
WBC # BLD AUTO: 19.7 THOUSAND/UL (ref 4.8–10.8)

## 2019-03-08 PROCEDURE — G8980 MOBILITY D/C STATUS: HCPCS

## 2019-03-08 PROCEDURE — 94760 N-INVAS EAR/PLS OXIMETRY 1: CPT

## 2019-03-08 PROCEDURE — 71045 X-RAY EXAM CHEST 1 VIEW: CPT

## 2019-03-08 PROCEDURE — 85027 COMPLETE CBC AUTOMATED: CPT | Performed by: INTERNAL MEDICINE

## 2019-03-08 PROCEDURE — 80048 BASIC METABOLIC PNL TOTAL CA: CPT | Performed by: ANESTHESIOLOGY

## 2019-03-08 PROCEDURE — 83605 ASSAY OF LACTIC ACID: CPT | Performed by: ANESTHESIOLOGY

## 2019-03-08 PROCEDURE — 97163 PT EVAL HIGH COMPLEX 45 MIN: CPT

## 2019-03-08 PROCEDURE — 82805 BLOOD GASES W/O2 SATURATION: CPT | Performed by: ANESTHESIOLOGY

## 2019-03-08 PROCEDURE — 84145 PROCALCITONIN (PCT): CPT | Performed by: INTERNAL MEDICINE

## 2019-03-08 PROCEDURE — 99232 SBSQ HOSP IP/OBS MODERATE 35: CPT | Performed by: INTERNAL MEDICINE

## 2019-03-08 PROCEDURE — 36600 WITHDRAWAL OF ARTERIAL BLOOD: CPT

## 2019-03-08 PROCEDURE — G8989 SELF CARE D/C STATUS: HCPCS

## 2019-03-08 PROCEDURE — 82948 REAGENT STRIP/BLOOD GLUCOSE: CPT

## 2019-03-08 PROCEDURE — 94660 CPAP INITIATION&MGMT: CPT

## 2019-03-08 PROCEDURE — 80202 ASSAY OF VANCOMYCIN: CPT | Performed by: INTERNAL MEDICINE

## 2019-03-08 PROCEDURE — G8978 MOBILITY CURRENT STATUS: HCPCS

## 2019-03-08 PROCEDURE — G8988 SELF CARE GOAL STATUS: HCPCS

## 2019-03-08 PROCEDURE — 94664 DEMO&/EVAL PT USE INHALER: CPT

## 2019-03-08 PROCEDURE — G8987 SELF CARE CURRENT STATUS: HCPCS

## 2019-03-08 PROCEDURE — 80053 COMPREHEN METABOLIC PANEL: CPT | Performed by: INTERNAL MEDICINE

## 2019-03-08 PROCEDURE — G8979 MOBILITY GOAL STATUS: HCPCS

## 2019-03-08 PROCEDURE — 99291 CRITICAL CARE FIRST HOUR: CPT | Performed by: ANESTHESIOLOGY

## 2019-03-08 PROCEDURE — 94640 AIRWAY INHALATION TREATMENT: CPT

## 2019-03-08 PROCEDURE — 97167 OT EVAL HIGH COMPLEX 60 MIN: CPT

## 2019-03-08 RX ORDER — HEPARIN SODIUM 5000 [USP'U]/ML
5000 INJECTION, SOLUTION INTRAVENOUS; SUBCUTANEOUS EVERY 8 HOURS SCHEDULED
Status: DISCONTINUED | OUTPATIENT
Start: 2019-03-08 | End: 2019-03-13 | Stop reason: HOSPADM

## 2019-03-08 RX ORDER — SODIUM CHLORIDE 9 MG/ML
125 INJECTION, SOLUTION INTRAVENOUS CONTINUOUS
Status: DISCONTINUED | OUTPATIENT
Start: 2019-03-08 | End: 2019-03-09

## 2019-03-08 RX ORDER — IPRATROPIUM BROMIDE AND ALBUTEROL SULFATE 2.5; .5 MG/3ML; MG/3ML
3 SOLUTION RESPIRATORY (INHALATION)
Status: DISCONTINUED | OUTPATIENT
Start: 2019-03-08 | End: 2019-03-13 | Stop reason: HOSPADM

## 2019-03-08 RX ADMIN — VANCOMYCIN HYDROCHLORIDE 750 MG: 1 INJECTION, POWDER, LYOPHILIZED, FOR SOLUTION INTRAVENOUS at 09:30

## 2019-03-08 RX ADMIN — METRONIDAZOLE 500 MG: 500 INJECTION, SOLUTION INTRAVENOUS at 12:08

## 2019-03-08 RX ADMIN — HEPARIN SODIUM 5000 UNITS: 5000 INJECTION, SOLUTION INTRAVENOUS; SUBCUTANEOUS at 15:55

## 2019-03-08 RX ADMIN — IPRATROPIUM BROMIDE AND ALBUTEROL SULFATE 3 ML: .5; 3 SOLUTION RESPIRATORY (INHALATION) at 14:00

## 2019-03-08 RX ADMIN — INSULIN LISPRO 2 UNITS: 100 INJECTION, SOLUTION INTRAVENOUS; SUBCUTANEOUS at 17:20

## 2019-03-08 RX ADMIN — CEFEPIME HYDROCHLORIDE 2000 MG: 2 INJECTION, SOLUTION INTRAVENOUS at 01:40

## 2019-03-08 RX ADMIN — SODIUM CHLORIDE 125 ML/HR: 9 INJECTION, SOLUTION INTRAVENOUS at 12:09

## 2019-03-08 RX ADMIN — IPRATROPIUM BROMIDE AND ALBUTEROL SULFATE 3 ML: .5; 3 SOLUTION RESPIRATORY (INHALATION) at 08:05

## 2019-03-08 RX ADMIN — SODIUM CHLORIDE 125 ML/HR: 9 INJECTION, SOLUTION INTRAVENOUS at 21:33

## 2019-03-08 RX ADMIN — HEPARIN SODIUM 5000 UNITS: 5000 INJECTION, SOLUTION INTRAVENOUS; SUBCUTANEOUS at 08:26

## 2019-03-08 RX ADMIN — IPRATROPIUM BROMIDE AND ALBUTEROL SULFATE 3 ML: .5; 3 SOLUTION RESPIRATORY (INHALATION) at 02:53

## 2019-03-08 RX ADMIN — METRONIDAZOLE 500 MG: 500 INJECTION, SOLUTION INTRAVENOUS at 04:17

## 2019-03-08 RX ADMIN — CEFEPIME HYDROCHLORIDE 2000 MG: 2 INJECTION, SOLUTION INTRAVENOUS at 12:15

## 2019-03-08 RX ADMIN — IPRATROPIUM BROMIDE AND ALBUTEROL SULFATE 3 ML: 2.5; .5 SOLUTION RESPIRATORY (INHALATION) at 19:41

## 2019-03-08 RX ADMIN — METRONIDAZOLE 500 MG: 500 INJECTION, SOLUTION INTRAVENOUS at 20:45

## 2019-03-08 RX ADMIN — VANCOMYCIN HYDROCHLORIDE 750 MG: 1 INJECTION, POWDER, LYOPHILIZED, FOR SOLUTION INTRAVENOUS at 22:00

## 2019-03-08 RX ADMIN — HEPARIN SODIUM 5000 UNITS: 5000 INJECTION, SOLUTION INTRAVENOUS; SUBCUTANEOUS at 22:01

## 2019-03-08 RX ADMIN — INSULIN LISPRO 1 UNITS: 100 INJECTION, SOLUTION INTRAVENOUS; SUBCUTANEOUS at 12:00

## 2019-03-08 NOTE — CONSULTS
Consultation - Nephrology   Garry White 66 y o  female MRN: [id number]  Unit/Bed#: ICU 03 Encounter: [id number]      A/P:  1  Acute kidney Injury: most likely from sepsis; will give ivf at 50cc/hr  2  Sepsis: continue current management   3  Hypertension: controlled; continue current management  4  UTI: being treated with antibiotics  5  CAP: continue current abx  Thank you for allowing us to participate in the care of your patient  Please feel free to contact us regarding the care of this patient, or any other questions/concerns that may be applicable  Patient Active Problem List   Diagnosis    Bronchospasm    Renal insufficiency    Type 2 diabetes mellitus with complication, without long-term current use of insulin ([facility] )    Pressure ulcer of left buttock, stage 2    Other dysphagia    Severe sepsis (HCC)    Acute on chronic respiratory failure with hypoxia and hypercapnia (HCC)       History of Present Illness   Physician Requesting Consult: Kelsy Plummer MD  Reason for Consult / Principal Problem: RUTH ANN  Hx and PE limited by:   HPI: Garry White is a 66y o  year old female who presents to the ED from her nursing facility after having been found with a change in mental status, febrile and tachycardic  Today on examination she is in no acute distress  She does not respond to verbal stimulation or touch  Her ROS is unobtainable due to her mental status  History obtained from chart review    Review of Systems - Negative except as mentioned above in HPI, more specifics as mentioned below    Review of Systems - Negative except as mentioned in HPI    Historical Information   Past Medical History:   Diagnosis Date    Anemia     [facility]     COPD (chronic obstructive pulmonary disease) ([facility]     home 02 2l    Diabetes mellitus ([facility] )     DJD (degenerative joint disease)     Gait abnormality     GERD (gastroesophageal reflux disease)     Hyperlipidemia     Hypertension     Stroke [facility])      Past Surgical History:   Procedure Laterality Date    ABDOMINAL SURGERY       Social History   Social History     Substance and Sexual Activity   Alcohol Use Never    Frequency: Never     Social History     Substance and Sexual Activity   Drug Use Never     Social History     Tobacco Use   Smoking Status Never Smoker   Smokeless Tobacco Never Used     Family History   Family history unknown: Yes       Meds/Allergies   all current active meds have been reviewed, current meds:   Current Facility-Administered Medications   Medication Dose Route Frequency    aspirin (ECOTRIN LOW STRENGTH) EC tablet 81 mg  81 mg Oral Daily    atorvastatin (LIPITOR) tablet 10 mg  10 mg Oral Daily    bisacodyl (DULCOLAX) EC tablet 10 mg  10 mg Oral Daily PRN    cefepime (MAXIPIME) IVPB (premix) 2,000 mg  2,000 mg Intravenous Q12H    heparin (porcine) subcutaneous injection 5,000 Units  5,000 Units Subcutaneous Q8H [address]    insulin glargine (LANTUS) subcutaneous injection 20 Units 0 2 mL  20 Units Subcutaneous HS    insulin lispro (HumaLOG) 100 units/mL subcutaneous injection 1-5 Units  1-5 Units Subcutaneous Q6H    ipratropium-albuterol (DUO-NEB) 0 5-2 5 mg/3 mL inhalation solution 3 mL  3 mL Nebulization Q6H    metroNIDAZOLE (FLAGYL) IVPB (premix) 500 mg  500 mg Intravenous Q8H    sertraline (ZOLOFT) tablet 50 mg  50 mg Oral Daily    sodium chloride (PF) 0 9 % injection 3 mL  3 mL Intravenous PRN    vancomycin (VANCOCIN) 750 mg in sodium chloride 0 9 % 250 mL IV piggyback  10 mg/kg (Adjusted) Intravenous Q12H    and PTA meds:    Medications Prior to Admission   Medication    acetaminophen (TYLENOL) 325 mg tablet    amLODIPine (NORVASC) 10 mg tablet    aspirin (ECOTRIN LOW STRENGTH) 81 mg EC tablet    atorvastatin (LIPITOR) 10 mg tablet    famotidine (PEPCID) 20 mg tablet    ferrous sulfate 325 (65 FE) MG EC tablet    gabapentin (NEURONTIN) 100 mg capsule    insulin aspart (NovoLOG) 100 units/mL injection    insulin glargine (LANTUS) 100 units/mL subcutaneous injection    isosorbide dinitrate (ISORDIL) 30 mg tablet    megestrol (MEGACE) 40 MG/ML suspension    metFORMIN (GLUCOPHAGE) 500 mg tablet    metoprolol tartrate (LOPRESSOR) 25 mg tablet    multivitamin (THERAGRAN) TABS    senna (SENOKOT) 8 6 MG tablet    sertraline (ZOLOFT) 50 mg tablet    tiotropium (SPIRIVA) 18 mcg inhalation capsule    albuterol (2 5 mg/3 mL) 0 083 % nebulizer solution    ammonium lactate (LAC-HYDRIN) 12 % cream    bisacodyl (DULCOLAX) 5 mg EC tablet    butalbital-acetaminophen-caffeine-codeine (FIORICET WITH CODEINE) -36-30 MG per capsule    dimethicone 1 % cream    glucagon (GLUCAGON EMERGENCY) 1 MG injection    LORazepam (ATIVAN) 0 5 mg tablet    magnesium hydroxide (MILK OF MAGNESIA) 400 mg/5 mL oral suspension    menthol-zinc oxide (CALMOSEPTINE) 0 44-20 6 % OINT         Allergies   Allergen Reactions    Codeine     Demerol [Meperidine]     Nsaids     Protonix [Pantoprazole]        Objective     Intake/Output Summary (Last 24 hours) at 3/8/2019 0931  Last data filed at 3/8/2019 0601  Gross per 24 hour   Intake 1000 ml   Output 375 ml   Net 625 ml       Invasive Devices:   Urethral Catheter 16 Fr  (Active)   Amt returned on insertion(mL) 200 mL 3/7/2019  8:40 AM   Site Assessment Clean 3/7/2019  8:40 AM   Collection Container Standard drainage bag 3/7/2019  8:40 AM   Securement Method Securing device (Describe) 3/7/2019  8:40 AM   Output (mL) 75 mL 3/8/2019  6:01 AM       Physical Exam      I/O last 3 completed shifts:   In: 1150 [IV Piggyback:1150]  Out: 575 [Urine:575]    Vitals:    03/08/19 0919   BP:    Pulse:    Resp:    Temp:    SpO2: 99%       Gen: lethargic with no verbal response   HEENT: no sclerous icterus, MMM, neck supple  CV: +S1/S2, RRR  Lungs: Decreased throughout  Abd: +BS, soft NT/ND  Ext: all four extremities are warm  Skin: no rashes noted  Neuro: CN II-XII intact    Current Weight: Weight - Scale: 82 8 kg (182 lb 8 oz)  First Weight: Weight - Scale: 81 2 kg (179 lb 0 2 oz)    Lab Results:  I have personally reviewed pertinent labs  CBC:   Lab Results   Component Value Date    WBC 19 70 (H) 03/08/2019    HGB 10 9 (L) 03/08/2019    HCT 32 7 (L) 03/08/2019     (H) 03/08/2019     03/08/2019    MCH 33 9 03/08/2019    MCHC 33 5 03/08/2019    RDW 16 0 (H) 03/08/2019    MPV 9 6 03/08/2019     CMP:   Lab Results   Component Value Date    K 4 7 03/08/2019     (H) 03/08/2019    CO2 17 (L) 03/08/2019    BUN 39 (H) 03/08/2019    CREATININE 1 82 (H) 03/08/2019    CALCIUM 8 3 (L) 03/08/2019    AST 21 03/08/2019    ALT 13 03/08/2019    ALKPHOS 47 (L) 03/08/2019    EGFR 26 03/08/2019     Phosphorus: No results found for: PHOS  Magnesium: No results found for: MG  Urinalysis: No results found for: COLORU, CLARITYU, SPECGRAV, PHUR, LEUKOCYTESUR, NITRITE, PROTEINUA, GLUCOSEU, KETONESU, BILIRUBINUR, BLOODU  Ionized Calcium: No results found for: CAION  Coagulation: No results found for: PT, INR, APTT  Troponin:   Lab Results   Component Value Date    TROPONINI 0 05 (H) 03/07/2019     ABG:   Lab Results   Component Value Date    PHART 7 320 (L) 03/07/2019    MWD0IDL 34 7 (L) 03/07/2019    PO2ART 72 0 (L) 03/07/2019    TJE5OZC 17 2 (L) 03/07/2019    BEART -7 8 (L) 03/07/2019    SOURCE Radial, Right 03/07/2019       Results from last 7 days   Lab Units 03/08/19  0500 03/07/19  0814   POTASSIUM mmol/L 4 7 4 4   CHLORIDE mmol/L 111* 104   CO2 mmol/L 17* 24   BUN mg/dL 39* 27*   CREATININE mg/dL 1 82* 1 14   CALCIUM mg/dL 8 3* 9 4   ALK PHOS U/L 47* 67   ALT U/L 13 13   AST U/L 21 17       Radiology review:  Procedure: Ct Chest Abdomen Pelvis Wo Contrast    Result Date: 3/7/2019  Narrative: CT CHEST, ABDOMEN AND PELVIS WITHOUT IV CONTRAST INDICATION:   Pneumonia complicated / unresolved Shortness of breath   COMPARISON:  August 22, 2017 TECHNIQUE: CT examination of the chest, abdomen and pelvis was performed without intravenous contrast   Axial, sagittal, and coronal 2D reformatted images were created from the source data and submitted for interpretation  Radiation dose length product (DLP) for this visit:  898 7 mGy-cm   This examination, like all CT scans performed in the [facility], was performed utilizing techniques to minimize radiation dose exposure, including the use of iterative reconstruction and automated exposure control  Enteric contrast was not administered  FINDINGS: CHEST LUNGS:  The trachea and central bronchial tree are patent  Consolidation is seen in the right upper lobe  Airspace opacities are seen within the lung bases  PLEURA:  Unremarkable  HEART/GREAT VESSELS:  Atherosclerotic changes are noted in thoracic aorta and coronary arteries  MEDIASTINUM AND EVELYN:  Subcentimeter mediastinal lymph nodes are seen  Limited evaluation the hilar region due to lack of IV contrast CHEST WALL AND LOWER NECK: There is a 2 2 cm left adrenal gland nodule  Incidental discovery of one or more thyroid nodule(s) measuring more than 1 5 cm and without suspicious features is noted in this patient who is above 28years old; according to guidelines published in the February 2015 white paper on incidental thyroid nodules in the Journal of the [employer] of Radiology [facility]), further characterization with thyroid ultrasound is recommended  ABDOMEN LIVER/BILIARY TREE:  Unremarkable  GALLBLADDER:  Gallbladder is not visualized and may be contracted  SPLEEN:  Unremarkable  PANCREAS:  Unremarkable  ADRENAL GLANDS:  Unremarkable  KIDNEYS/URETERS:  No evidence of hydronephrosis 8 mm calcification in the left renal pelvis is noted  STOMACH AND BOWEL:  Liquid stool is seen within the colon  Thickening of the rectal wall with fecal material is noted  Colonic diverticulosis without evidence of acute diverticulitis is seen   APPENDIX:  Not visualized  ABDOMINOPELVIC CAVITY:  Small amount of free fluid in the pelvis  VESSELS:  Atherosclerotic changes are present  No evidence of aneurysm  PELVIS REPRODUCTIVE ORGANS:  Patient is status post hysterectomy  URINARY BLADDER:  Ott catheter is seen within urinary bladder  Mild thickening of the urinary bladder wall is seen  ABDOMINAL WALL/INGUINAL REGIONS:  Multiple injection granulomas are seen in the soft tissues  OSSEOUS STRUCTURES:  Prior extensive spinal surgery is seen  Right ORIF of the femur is noted  Diffuse osteopenia is seen  Impression: Consolidation in the right upper lobe with scattered airspace opacity in lungs suggestive of infection  Follow-up to resolution is recommended  Thickening of the rectal wall with surrounding inflammatory changes  Findings may represent stercoral colitis  Follow-up with GI is recommended  Thickening of the urinary bladder wall which may represent cystitis  Workstation performed: [id number]     Procedure: Xr Chest 1 View Portable    Result Date: 3/7/2019  Narrative: CHEST INDICATION:   cp  COMPARISON:  2/14/2019 EXAM PERFORMED/VIEWS:  XR CHEST PORTABLE Images: 1 FINDINGS: Cardiomediastinal silhouette appears unremarkable  Chronic vague left lower lobe opacity with adjacent pleural thickening upper lobe and right lung remain clear  No pneumothorax  Evidence of thoracolumbar spinal fusion  Impression: Chronic vague left lower lobe opacity with adjacent pleural thickening  Workstation performed: [id number]     Procedure: Ct Head Without Contrast    Result Date: 3/7/2019  Narrative: CT BRAIN - WITHOUT CONTRAST INDICATION:   Confusion/delirium, altered LOC, unexplained  COMPARISON:  February 14, 2019 TECHNIQUE:  CT examination of the brain was performed  In addition to axial images, coronal 2D reformatted images were created and submitted for interpretation  Radiation dose length product (DLP) for this visit:  872 mGy-cm     This examination, like all CT scans performed in the [facility], was performed utilizing techniques to minimize radiation dose exposure, including the use of iterative reconstruction and automated exposure control  IMAGE QUALITY:  Diagnostic  FINDINGS: PARENCHYMA:  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  Chronic infarct seen in the right basal ganglionic region involving the predominant and head of the caudate nucleus  Chronic infarct seen in the left basal ganglionic region  Moderate periventricular and white matter hypodensity seen related to chronic small vessel ischemic changes VENTRICLES AND EXTRA-AXIAL SPACES:  Dilation of the ventricle system seen which is out of proportion to that of simple cyst VISUALIZED ORBITS AND PARANASAL SINUSES:  Mucosal thickening seen in the both maxillary sinuses Soft tissue density seen within the left external auditory madhavi Mucosal thickening seen in the sphenoid sinus, ethmoidal air cells and maxillary sinuses, chronic Mild soft tissue density seen within the right external auditory canal l CALVARIUM AND EXTRACRANIAL SOFT TISSUES:  No gross lytic lesion seen     Impression: No acute intracranial hemorrhage seen No new hypodense area seen within the brain parenchyma to suggest territorial infarct Bilateral chronic deep infarcts in the basal ganglionic region, chronic right cerebellar infarct Moderate to periventricular and white matter hypodensity related to chronic small vessel ischemic changes Soft tissue density seen in the left external auditory canal with complete opacification and without any erosive changes  Suggest direct visual inspection and clinical evaluation for definite characterization  This is unchanged, statistically this may be due to cerumen   Mild soft tissue density seen within the right effusion noted  I personally discussed this study with Andre Saucedo on 3/7/2019 at 9:58 AM   Workstation performed: [id number]         EKG, Pathology, and Other Studies: I have reviewed pertinent studies       Counseling / Coordination of Care  Total ADDITIONAL floor / unit time spent today 70 minutes  Greater than 50% of total time was spent with the patient and / or family counseling and / or coordination of care       Clark Seip, CRNP

## 2019-03-08 NOTE — CONSULTS
Consultation - GI   Chetna Chely Bejarano 66 y o  female MRN: [id number]  Unit/Bed#: ICU 03 Encounter: [id number]      Assessment/Plan     Assessment:  Rectal wall thickness on CT scan possible stercoral colitis  Anemia  Plan:  Patient has been having bowel movements which are soft according to the nursing staff  There is no rectal bleeding or melena  The CT finding could be proctitis versus stercoral ulcer from previous constipation but currently she is not constipated  There is no intestinal obstruction noted  I did discuss the case with Dr Leopold Call, who is the hospitalist   I tried to call the daughter Elaine Smith but got a machine  Will again at tried to contact her to see if they want any invasive procedures like colonoscopy since the patient is DNR  History of Present Illness   Physician Requesting Consult: Al Bhatti MD  Reason for Consult / Principal Problem:  Stercoral colitis on CT scan  Hx and PE limited by:   HPI: Maria L Leslie is a 66y o  year old female who presents with change of mental status and hypoxia  Patient is a resident of the nursing home and does not give any meaningful history  She does not communicate verbally possibly from previous CVAs  History is taken by going through the chart talking to the attending physician and nursing staff and previous history  She was noted to have aspiration pneumonia and sepsis secondary to that  She has been on antibiotics  Initially it was thought that the patient was constipated in the nursing home but since he has been in the ICU according to the nursing staff she has had multiple soft large volume bowel movements without any problem     No rectal bleeding or melena has been noted  Patient does not have any abdominal pain or tenderness  This was assessed by examining her which she does not pains or gives the impression of any abdominal tenderness    She was also noted to have leukocytosis and a hemoglobin of 10 9 which has been stable  Patient has not had any oral intake since she has been in the ICU  According to the hospitalist a PEG tube placement was discussed with the family who did not agree to it  There is no reported nausea vomiting or hematemesis  Her past medical history significant for  History of CVA  History of COPD   History of CHF   DGD  Anemia  Hypertension  GERD  History of DVT  Status post appendectomy  Status post cardiac catheterization  Status post cholecystectomy  Status post hysterectomy  Status post knee and back surgery  Medications noted  She is also on antibiotics for sepsis  Allergies codeine and Demerol  Psychosocial history she is a nursing home patient no history of alcohol use no history of IVDA no history of alcohol abuse no history of smoking  Family history noncontributory for any GI malignancies  On physical examination she is an elderly white female in no acute distress  She is nonverbal cannot communicate  Her BP is 145/65  Pulse is  97 per minute  Respiratory rate is 18  T-max is 101° current 97  HEENT anicteric sclera neck supple no JVD  Chest decreased breath sounds on the right side  Heart rate rhythm regular S1-S2  Abdomen soft nontender nondistended positive bowel sound  Extremities some pedal edema  Rectal   Soft stool  No blood noted  Neuro  She cannot communicate verbally and does not follow commands  Labs show a [place] of 76435 hemoglobin 10 9 hematocrit 32 7%  BUN is 39 creatinine 1 82  CT scan shows right upper quadrant infiltrate  Thick rectal wall questionable stercoral colitis  No obstruction of bowel noted  Consults    Review of Systems  CT findings as above  Diarrhea  Anemia  Sepsis  Most likely aspiration pneumonia      Historical Information   Past Medical History:   Diagnosis Date    Anemia     [facility]     COPD (chronic obstructive pulmonary disease) ([facility]     home 02 2l    [facility]     SHELIAD (degenerative joint disease)     Gait abnormality     GERD (gastroesophageal reflux disease)     Hyperlipidemia     Hypertension     Stroke [facility])      Past Surgical History:   Procedure Laterality Date    ABDOMINAL SURGERY       Social History   Social History     Substance and Sexual Activity   Alcohol Use Never    Frequency: Never     Social History     Substance and Sexual Activity   Drug Use Never     Social History     Tobacco Use   Smoking Status Never Smoker   Smokeless Tobacco Never Used     Family History: non-contributory    Meds/Allergies   all current active meds have been reviewed    Allergies   Allergen Reactions    Codeine     Demerol [Meperidine]     Nsaids     Protonix [Pantoprazole]        Objective       Intake/Output Summary (Last 24 hours) at 3/8/2019 1557  Last data filed at 3/8/2019 0601  Gross per 24 hour   Intake    Output 75 ml   Net -75 ml       Invasive Devices:   Peripheral IV 03/07/19 Left Antecubital (Active)   Site Assessment Clean;Dry; Intact 3/8/2019  8:00 AM   Dressing Type Transparent 3/8/2019  8:00 AM   Line Status Capped;Flushed 3/8/2019  8:00 AM   Dressing Status Clean;Dry; Intact 3/8/2019  8:00 AM   Dressing Change Due 03/11/19 3/8/2019  8:00 AM       Urethral Catheter 16 Fr  (Active)   Amt returned on insertion(mL) 200 mL 3/7/2019  8:40 AM   Site Assessment Clean 3/7/2019  8:40 AM   Collection Container Standard drainage bag 3/7/2019  8:40 AM   Securement Method Securing device (Describe) 3/7/2019  8:40 AM   Output (mL) 75 mL 3/8/2019  6:01 AM       Physical Exam    Lab Results: I have personally reviewed pertinent reports  Imaging Studies: I have personally reviewed pertinent reports  EKG, Pathology, and Other Studies:     VTE Prophylaxis:     Counseling / Coordination of Care  Total floor / unit time spent today 75 minutes  Greater than 50% of total time was spent with the patient and / or family counseling and / or coordination of care   A description of the counseling / coordination of care: We will again discuss the plan with the daughter when she is available to discuss it and will see if she wants me to do a colonoscopy for further evaluation or not  Patient is DNR and the family does not want any PEG placement

## 2019-03-08 NOTE — PHYSICAL THERAPY NOTE
Physical Therapy Evaluation     Patient's Name: Swapna Oglesby    Admitting Diagnosis  Shortness of breath [R06 02]  Bronchospasm [J98 01]  Hypoxia [R09 02]  COPD exacerbation ([facility] ) [J44 1]  Sepsis, due to unspecified organism ([facility] ) [A41 9]  Altered mental status, unspecified altered mental status type [R41 82]    Problem List  Patient Active Problem List   Diagnosis    Bronchospasm    Renal insufficiency    Type 2 diabetes mellitus with complication, without long-term current use of insulin ([facility] )    Pressure ulcer of left buttock, stage 2    Other dysphagia    Severe sepsis ([facility] )    Acute on chronic respiratory failure with hypoxia and hypercapnia ([facility] )       Past Medical History  Past Medical History:   Diagnosis Date    Anemia     CHF (congestive heart failure) ([facility] )     COPD (chronic obstructive pulmonary disease) ([facility] )     home 02 2l    Diabetes mellitus ([facility] )     DJD (degenerative joint disease)     Gait abnormality     GERD (gastroesophageal reflux disease)     Hyperlipidemia     Hypertension     Stroke [facility])        Past Surgical History  Past Surgical History:   Procedure Laterality Date    ABDOMINAL SURGERY          03/08/19 0930   Note Type   Note type Eval only   Pain Assessment   Pain Assessment FLACC   Pain Rating: FLACC (Rest) - Face 0   Pain Rating: FLACC (Rest) - Legs 0   Pain Rating: FLACC (Rest) - Activity 0   Pain Rating: FLACC (Rest) - Cry 0   Pain Rating: FLACC (Rest) - Consolability 0   Score: FLACC (Rest) 0   Pain Rating: FLACC (Activity) - Face 1   Pain Rating: FLACC (Activity) - Legs 0   Pain Rating: FLACC (Activity) - Activity 0   Pain Rating: FLACC (Activity) - Cry 1   Pain Rating: FLACC (Activity) - Consolability 0   Score: FLACC (Activity) 2   Home Living   Type of Home SNF  [facility])   Home Layout One level;Performs ADLs on one level; Able to live on main level with bedroom/bathroom; Access; Ramped entrance;Elevator   Bathroom Shower/Tub Walk-in shower Bathroom Toilet Raised   Bathroom Equipment Grab bars in shower;Grab bars around toilet;Commode   Bathroom Accessibility Accessible via wheelchair   [address] [facility]; Wheelchair-manual   Additional Comments Patient requires total dependence with functional mobility, full body mechanical lift     Prior Function   Level of [place] Total dependent   Lives With Facility staff   Receives Help From Personal care attendant   ADL Assistance Needs assistance   IADLs Needs assistance   Vocational Retired   Restrictions/Precautions   [place] Bearing Precautions Per Order No   Other Precautions Cognitive;Multiple lines;Telemetry;O2;Fall Risk  (wrist restraints)   General   Family/Caregiver Present No   Cognition   Overall Cognitive Status Unable to assess   Arousal/Participation Poorly responsive   Orientation Level Unable to assess   Memory Unable to assess   Following Commands Unable to follow one step commands   RUE Assessment   RUE Assessment WFL  (PROM assessment)   LUE Assessment   LUE Assessment WFL  (PROM assessment)   RLE Assessment   RLE Assessment X   RLE Overall PROM   R Hip Flexion limited   R Hip Extension limited   R Knee Flexion limited   LLE Assessment   LLE Assessment X   LLE Overall PROM   L Hip Flexion limited   L Hip Extension limited   L Knee Flexion limited   Coordination   Movements are Fluid and Coordinated 0   Bed Mobility   Rolling R Unable to assess  (2/2 medical status, recent removal Bipap->10 L O2)   Transfers   Sit to Stand Unable to assess   Balance   Static Sitting   (unable to assess)   Endurance Deficit   Endurance Deficit Yes   Endurance Deficit Description BP: 134/55 POX: 94% w/ 10 L O2   Activity Tolerance   Activity Tolerance Treatment limited secondary to medical complications (Comment)  (Bipap, decreased responsiveness w/safety concerns)   Nurse Made Aware yes   Assessment   Assessment Pt is 66 y o  female seen for PT evaluation s/p admit to [address] on 3/7/2019 w/ Severe sepsis ([facility] )  PT consulted to assess pt's functional mobility and d/c needs  Order placed for PT eval and tx, w/ activity as tolerated order  Comorbidities affecting pt's physical performance at time of assessment include: weakness, severe sepsis, DM, pressure ulcer of buttocks, Bipap, exacerbation COPD, altered mental status  PTA, pt was dependent for all mobility tasks  Personal factors affecting pt at time of IE include: communication issues, decreased initiation and engagement and unable to perform physical activity  Please find objective findings from PT assessment regarding body systems outlined above with impairments and limitations including weakness, decreased ROM, impaired balance, decreased endurance, decreased activity tolerance, decreased functional mobility tolerance, altered sensation, fall risk, decreased skin integrity and decreased cognition  The following objective measures performed on IE also reveal limitations: Barthel Index: 0/100  Pt's clinical presentation is currently unstable/unpredictable  Pt to benefit from continued PT tx to address deficits as defined above and maximize level of functional independent mobility and consistency  From PT/mobility standpoint, recommendation at time of d/c would be return to SNF  D/C acute skilled PT interventions as patient appears to be at functional baseline  Goals   Patient Goals unknown as patient could not provide 2/2 decreased level of alertness   Treatment Day 0   Recommendation   Recommendation Long-term skilled nursing home placement;D/C PT  (as pt  is at functional baseline, return to [facility])   PT - OK to Discharge Yes  (when medically stable)   Additional Comments Upon conclusion, patient was resting in bed w/ Bipap placed and SCD's active     Barthel Index   Feeding 0   Bathing 0   Grooming Score 0   Dressing Score 0   Bladder Score 0   Bowels Score 0   Toilet Use Score 0   Transfers (Bed/Chair) Score 0   Mobility (Level Surface) Score 0 Stairs Score 0   Barthel Index Score 0     Laura Simmons, PT

## 2019-03-08 NOTE — PLAN OF CARE
Problem: Potential for Falls  Goal: Patient will remain free of falls  Description  INTERVENTIONS:  - Assess patient frequently for physical needs  -  Identify cognitive and physical deficits and behaviors that affect risk of falls  -  [place] fall precautions as indicated by assessment   - Educate patient/family on patient safety including physical limitations  - Instruct patient to call for assistance with activity based on assessment  - Modify environment to reduce risk of injury  - Consider OT/PT consult to assist with strengthening/mobility  Outcome: Progressing     Problem: Prexisting or High Potential for Compromised Skin Integrity  Goal: Skin integrity is maintained or improved  Description  INTERVENTIONS:  - Identify patients at risk for skin breakdown  - Assess and monitor skin integrity  - Assess and monitor nutrition and hydration status  - Monitor labs (i e  albumin)  - Assess for incontinence   - Turn and reposition patient  - Assist with mobility/ambulation  - Relieve pressure over bony prominences  - Avoid friction and shearing  - Provide appropriate hygiene as needed including keeping skin clean and dry  - Evaluate need for skin moisturizer/barrier cream  - Collaborate with interdisciplinary team (i e  Nutrition, Rehabilitation, etc )   - Patient/family teaching  Outcome: Progressing     Problem: Nutrition/Hydration-ADULT  Goal: Nutrient/Hydration intake appropriate for improving, restoring or maintaining nutritional needs  Description  Monitor and assess patient's nutrition/hydration status for malnutrition (ex- brittle hair, bruises, dry skin, pale skin and conjunctiva, muscle wasting, smooth red tongue, and disorientation)  Collaborate with interdisciplinary team and initiate plan and interventions as ordered  Monitor patient's weight and dietary intake as ordered or per policy  Utilize nutrition screening tool and intervene per policy   Determine patient's food preferences and provide high-protein, high-caloric foods as appropriate       INTERVENTIONS:  - Monitor oral intake, urinary output, labs, and treatment plans  - Assess nutrition and hydration status and recommend course of action  - Evaluate amount of meals eaten  - Assist patient with eating if necessary   - Allow adequate time for meals  - Recommend/ encourage appropriate diets, oral nutritional supplements, and vitamin/mineral supplements  - Order, calculate, and assess calorie counts as needed  - Recommend, monitor, and adjust tube feedings and TPN/PPN based on assessed needs  - Assess need for intravenous fluids  - Provide specific nutrition/hydration education as appropriate  - Include patient/family/caregiver in decisions related to nutrition  Outcome: Progressing

## 2019-03-08 NOTE — ASSESSMENT & PLAN NOTE
No results found for: HGBA1C    Recent Labs     03/07/19  1547 03/08/19  0007 03/08/19  0543 03/08/19  0757   POCGLU 168* 193* 216* 187*       Blood Sugar Average: Last 72 hrs:  (P) 283 [id number]     We will give half dose Lantus while patient is NPO along with sliding scale

## 2019-03-08 NOTE — ASSESSMENT & PLAN NOTE
· Severe sepsis secondary to aspiration pneumonia, colitis  · Appreciate CT results - pneumonia and colitis noted  · We will treat with broad-spectrum antibiotics, namely vancomycin, cefepime and Flagyl  · Monitor intensive care unit  · After an extensive discussion with the patient's daughter, she notes that she would like to pursue aggressive medical treatment and measures until the point of cardiac arrest, with the understanding the patient will remain DNR/DNI  · Will consult GI in light of colitis noted on CT scan  · Appreciate critical care input

## 2019-03-08 NOTE — UTILIZATION REVIEW
Initial Clinical Review    Admission: Date/Time/Statement: 3/7/19 @ 0905 INPATIENT  Orders Placed This Encounter   Procedures    Inpatient Admission     Standing Status:   Standing     Number of Occurrences:   1     Order Specific Question:   Admitting Physician     Answer:   Marlena Yuan [[id number]]     Order Specific Question:   Level of Care     Answer:   Critical Care [15]     Order Specific Question:   Estimated length of stay     Answer:   More than 2 Midnights     Order Specific Question:   Certification     Answer:   I certify that inpatient services are medically necessary for this patient for a duration of greater than two midnights  See H&P and MD Progress Notes for additional information about the patient's course of treatment  ED: Date/Time/Mode of Arrival:   ED Arrival Information     Expected Arrival Acuity Means of Arrival Escorted By Service Admission Type    - 3/7/2019 08:03 [facility]    Arrival Complaint    trouble breathing        Chief Complaint:   Chief Complaint   Patient presents with    Shortness of Breath     pt recently treated for pneumonia per NH staff     Lethargy     Assessment/Plan: 67 y/o female presents to ED from nursing home by EMS with altered mental status and SOB  Per NH staff report, pt was found tachypneic with a RA sat in low 80s  Patient is altered compared to baseline, normally patient is talkative and arousable  Patient is more lethargic this a m  Per nursing facility note, patient found to be febrile at 2 a m  With T-max of 101°  Pt had recent hospitalization with pneumonia  On exam, pt is in respiratory distress, tachypneic, lethargic, has rhonchi  Placed on bipap in ED  Admitted as inpatient to ICU level of care due to severe sepsis, acute respiratory failure with hypoxia and hypercapnia    Severe sepsis ([address] )  Assessment & Plan  · Severe sepsis secondary to aspiration pneumonia  · We will treat with broad-spectrum antibiotics, namely vancomycin, cefepime and Flagyl  · Monitor intensive care unit  · Family would like to pursue aggressive medical therapies, however, they have elected to make the patient DNR/DNI  · If no clinical improvement next 24-48 hours, they would like to consider hospice  · Consul critical care     Acute on chronic respiratory failure with hypoxia and hypercapnia (HCC)  Assessment & Plan  · Secondary to aspiration pneumonia  · On BiPAP currently  · DNR/DNI  · Monitor in ICU     Other dysphagia  Assessment & Plan  · Secondary to history of CVA  · NPO for now  · I did discuss the possibility of PEG tube during the previous admission and this time as well, and family preferred not to have PEG tube placed  If no clinical improvement next been for 48 hours, they would like to consider hospice     Pressure ulcer of left buttock, stage 2  Assessment & Plan  · Present on admission  · Wound care     Type 2 diabetes mellitus with complication, without long-term current use of insulin ([facility] [address] )  Assessment & Plan  No results found for: HGBA1C          Recent Labs     03/07/19  0839 03/07/19  1140   POCGLU 152* 145*         Blood Sugar Average: Last 72 hrs:  (P) 148  5      We will give half dose Lantus while patient is NPO along with sliding scale    Anticipated Length of Stay:  Patient will be admitted on an Inpatient basis with an anticipated length of stay of  > 2 midnights     Justification for Hospital Stay: respiratory failure    ED Vital Signs:   ED Triage Vitals   Temperature Pulse Respirations Blood Pressure SpO2   03/07/19 0805 03/07/19 0805 03/07/19 0805 03/07/19 0805 03/07/19 0805   (!) 96 8 °F (36 °C) 82 (!) 24 (!) 197/86 (!) 83 %      Temp Source Heart Rate Source Patient Position - Orthostatic VS BP Location FiO2 (%)   03/07/19 0805 03/07/19 0805 03/07/19 0805 03/07/19 0805 03/07/19 0812   Temporal Monitor Sitting Right arm 100      Pain Score       03/07/19 0805       No Pain        Wt Readings from Last 1 Encounters:   03/07/19 82 8 kg (182 lb 8 oz)     Vital Signs (abnormal):   03/07/19 0845  99 1 °F (37 3 °C)  90  29Abnormal   176/81Abnormal     100 %  Other (comment)   Lying   O2 Device: BiPap at 03/07/19 0845   03/07/19 0830    95  32Abnormal   205/88Abnormal     99 %  Other (comment)   Lying   O2 Device: BiPap at 03/07/19 0830       Pertinent Labs/Diagnostic Test Results:   pH, Arterial  7 240   pCO2, Arterial  57 8   pO2, Arterial  104 0   HCO3, Arterial  23 9   Base Excess, Arterial  -3 7   O2 Content, Arterial  15 6   O2 HGB,Arterial  95 6   ABG SOURCE  Brach    Non Vent type BIPAP  BIPAP   IPAP  12   EPAP  6   BIPAP fio2  100     GFR 46  BUN/Cr 27/1 14, 39/1 82  Total CK 20  Trop 0 03, 0 05    WBC 12 10, 19 70  H/H 11 7/33 9, 10 9/32 7  Procalc 0 58, 1 34, 66 81  UA 2+ blood, 1+ leuks, 1-2 RBC, 4-10 WBC, occasional bacteria  EKG:  Baseline artifact  Sinus rhythm  Inferior infarct (cited on or before 14-FEB-2019)  CXR:  Chronic vague left lower lobe opacity with adjacent pleural thickening  CT abd/pelvis:  Consolidation in the right upper lobe with scattered airspace opacity in lungs suggestive of infection   Follow-up to resolution is recommended  Thickening of the rectal wall with surrounding inflammatory changes   Findings may represent stercoral colitis   Follow-up with GI is recommended  Thickening of the urinary bladder wall which may represent cystitis    CT head:  No acute intracranial hemorrhage seen  No new hypodense area seen within the brain parenchyma to suggest territorial infarct  Bilateral chronic deep infarcts in the basal ganglionic region, chronic right cerebellar infarct  Moderate to periventricular and white matter hypodensity related to chronic small vessel ischemic changes  Soft tissue density seen in the left external auditory canal with complete opacification and without any erosive changes   Suggest direct visual inspection and clinical evaluation for definite characterization   This is unchanged, statistically this may   be due to cerumen  Mild soft tissue density seen within the right effusion noted  Repeat CXR 3/8:  Small right costophrenic angle effusion with overlying density in the medial right lung base which could represent atelectasis or pneumonia  ED Treatment:   Medication Administration from 03/07/2019 0803 to 03/07/2019 1024       Date/Time Order Dose Route Action Action by Comments     03/07/2019 0932 vancomycin (VANCOCIN) 1,250 mg in sodium chloride 0 9 % 250 mL IVPB 1,250 mg Intravenous New Bag Layton Dean RN 1st antibiotic given then began this one     03/07/2019 0931 piperacillin-tazobactam (ZOSYN) 4 5 g in sodium chloride 0 9 % 100 mL IVPB 0 g Intravenous Stopped Layton Dean RN      03/07/2019 0852 piperacillin-tazobactam (ZOSYN) 4 5 g in sodium chloride 0 9 % 100 mL IVPB 4 5 g Intravenous [address] Hawk Centeno RN      03/07/2019 6237 sodium chloride 0 9 % bolus 2,000 mL 0 mL Intravenous Stopped [place] Centeno RN      03/07/2019 5409 sodium chloride 0 9 % bolus 2,000 mL 2,000 mL Intravenous [address] Layton Dean RN      03/07/2019 4535 sodium chloride 0 9 % bolus 1,000 mL 0 mL Intravenous Stopped Hawk Centeno RN      03/07/2019 1759 sodium chloride 0 9 % bolus 1,000 mL 1,000 mL Intravenous [place] Dean RN         Past Medical/Surgical History:    Active Ambulatory Problems     Diagnosis Date Noted    Bronchospasm 02/14/2019    Renal insufficiency 02/14/2019    Type 2 diabetes mellitus with complication, without long-term current use of insulin ([facility] [address] ) 02/14/2019    Pressure ulcer of left buttock, stage 2 02/14/2019    Other dysphagia 02/14/2019     Resolved Ambulatory Problems     Diagnosis Date Noted    Bacteremia due to Escherichia coli 02/14/2019    Acute on chronic respiratory failure with hypoxia ([facility] [address] ) 02/14/2019    Pneumonia due to infectious organism 02/14/2019    Hypernatremia 02/14/2019    COPD with acute exacerbation ([facility] ) 02/14/2019     Past Medical History:   Diagnosis Date    Anemia     CHF (congestive heart failure) (HCC)     COPD (chronic obstructive pulmonary disease) (HCC)     Diabetes mellitus (HCC)     DJD (degenerative joint disease)     Gait abnormality     GERD (gastroesophageal reflux disease)     Hyperlipidemia     Hypertension     Stroke [facility])      Admitting Diagnosis: Shortness of breath [R06 02]  Bronchospasm [J98 01]  Hypoxia [R09 02]  COPD exacerbation (HCC) [J44 1]  Sepsis, due to unspecified organism ([place] [address] ) [A41 9]  Altered mental status, unspecified altered mental status type [R41 82]  Age/Sex: 66 y o  female     Admission Orders:  Restraints  Accuchecks  Soap suds enema  SCDs  VS  HOB 30 deg  Aspiration prec  I/S  Tele  Ott catheter  Labs  NPO  Gastroenterology consult  Nephrology consult  Critical care consult  High Flow O2  Respiratory protocol    Scheduled Meds:   Current Facility-Administered Medications:  aspirin 81 mg Oral Daily   atorvastatin 10 mg Oral Daily   bisacodyl 10 mg Oral Daily PRN   cefepime 2,000 mg Intravenous Q12H   heparin (porcine) 5,000 Units Subcutaneous Q8H [facility]   insulin glargine 20 Units Subcutaneous HS   insulin lispro 1-6 Units Subcutaneous Q6H [facility]   ipratropium-albuterol 3 mL Nebulization Q6H   metroNIDAZOLE 500 mg Intravenous Q8H   sertraline 50 mg Oral Daily   sodium chloride (PF) 3 mL Intravenous PRN   sodium chloride 125 mL/hr Intravenous Continuous   vancomycin 10 mg/kg (Adjusted) Intravenous Q12H

## 2019-03-08 NOTE — PLAN OF CARE
Problem: DISCHARGE PLANNING - CARE MANAGEMENT  Goal: Discharge to post-acute care or home with appropriate resources  Description  INTERVENTIONS:  - Conduct assessment to determine patient/family and health care team treatment goals, and need for post-acute services based on payer coverage, community resources, and patient preferences, and barriers to discharge  - Address psychosocial, clinical, and financial barriers to discharge as identified in assessment in conjunction with the patient/family and health care team  - Arrange appropriate level of post-acute services according to patient?s   needs and preference and payer coverage in collaboration with the physician and health care team  - Communicate with and update the patient/family, physician, and health care team regarding progress on the discharge plan  - Arrange appropriate transportation to post-acute venues  - Patient is a resident of [facility] and will return to [facility] upon dsicharge   Outcome: Progressing

## 2019-03-08 NOTE — PROGRESS NOTES
Critical Care Interval Progress Note     Shital Bejarano 66 y o  female MRN: [id number]    Unit/Bed#: ICU 03 Encounter: [id number]    Impression:  Principal Problem:    Severe sepsis ([address] )  Active Problems:    Type 2 diabetes mellitus with complication, without long-term current use of insulin (HCC)    Pressure ulcer of left buttock, stage 2    Other dysphagia    Acute on chronic respiratory failure with hypoxia and hypercapnia (HCC)  Resolved Problems:    * No resolved hospital problems  *        Plan:  I independently examined the patient, reviewed all laboratory and imaging studies as well as all input from consultants  The patient's care was discussed extensively with ALY Scott  We are managing:    Severe sepsis secondary to right upper lobe pneumonia, likely due to aspiration - Continue vancomycin, cefepime and metronidazole  Follow procalcitonin and leukocytosis  Follow blood cultures  Toxic metabolic encephalopathy - Presumed secondary to sepsis  Serial neuro evaluation  Concern for stercoral colitis - Administer enema  Obtain GI consult for further management and consideration of endoscopic disimpaction pending decision on goals of care  Acute kidney injury - Monitor urine output  Continue to fluid resuscitate  Obtain nephrology consult  May need to consider lasix challenge if she remains hemodynamically stable, but continues with no urine output  Acute on chronic combined hypoxemic and hypercarbic respiratory failure - Weaned from BiPAP to high flow nasal cannula given aspiration risk and because mechanism of underlying respiratory failure is pneumonia and not COPD  Repeat ABG off BiPAP  Sacral wound - Local wound care  DM-II - Blood glucose well-controlled  Continue lantus at half of home dose  SSI to cover hyperglycemia  Hx of CVA with resultant dysphagia - Remains NPO  PPX - Change lovenox to heparin given worsening renal function  SCDs  No indication for SUP      Access - PIV       Total critical care time in the evaluation and treatment of this patient is 40 min  This time includes history, physical, review of laboratory, radiographic and ancillary studies, discussion with patient/family/healthcare workers and coordination of care  This time is exclusive of any invasive procedures  ______________________________________________________________________    Chief Complaint: Tachypnea, altered mental status    Recent Events / Nursing Concern: Volume resuscitated yesterday evening due to hypotension  Hemodynamically stable this morning with systolic consistently around 130 mmHg  Minimal to no urine output overnight  No change in mental status  Vitals:   Vitals:    19 0600 19 0800 19 0919 19 1000   BP:       BP Location:       Pulse: 93      Resp: 15      Temp:       TempSrc:       SpO2: 96% 99% 99% 100%   Weight:       Height:                 Temperature: Temp (24hrs), Av 8 °F (36 6 °C), Min:97 6 °F (36 4 °C), Max:98 °F (36 7 °C)  Current: Temperature: 97 9 °F (36 6 °C)    Hemodynamic Monitoring:  N/A       Respiratory:  SpO2 Device: O2 Device: Other (comment)(high flow NC)  O2 Flow Rate (L/min): 8 L/min(O2 decreased to 6l/m via HFNC @ this time  )    Physical Exam:  Physical Exam  General: Breathing mildly labored  Does not open eyes to voice  Pupils equal and reactive, 3 mm bilaterally  CV: Regular rhythm, normal rate  Pulm: Rales audible on right  Breath sounds otherwise diminished  Abdomen: Grimace with palpation of lower abdomen  No rebound  Abdomen soft, non-distended  Ext: Warm without edema  Allergies:    Allergies   Allergen Reactions    Codeine     Demerol [Meperidine]     Nsaids     Protonix [Pantoprazole]        Medications:   Scheduled Meds:  Current Facility-Administered Medications:  aspirin 81 mg Oral Daily Charissa Talamantes MD    atorvastatin 10 mg Oral Daily Charissa Talamantes MD    bisacodyl 10 mg Oral Daily PRN Charsisa Talamantes MD cefepime 2,000 mg Intravenous Q12H Den Navarrete MD Last Rate: 2,000 mg (03/08/19 0140)   heparin (porcine) 5,000 Units Subcutaneous Q8H Marla Hurst MD    insulin glargine 20 Units Subcutaneous HS Den Navarrete MD    insulin lispro 1-5 Units Subcutaneous Q6H Theodore Rosenberg PA-C    ipratropium-albuterol 3 mL Nebulization Q6H Den Navarrete MD    metroNIDAZOLE 500 mg Intravenous Yovanny Humphrey MD Last Rate: Stopped (03/08/19 0447)   sertraline 50 mg Oral Daily Den Navarrete MD    sodium chloride (PF) 3 mL Intravenous PRN Den Navarrete MD    sodium chloride 50 mL/hr Intravenous Continuous ALY Franklin    vancomycin 10 mg/kg (Adjusted) Intravenous Q12H Den Navarrete MD Last Rate: Stopped (03/08/19 0020)     Continuous Infusions:  sodium chloride 50 mL/hr     PRN Meds:    bisacodyl 10 mg Daily PRN   sodium chloride (PF) 3 mL PRN       Labs:   Results from last 7 days   Lab Units 03/08/19  0500 03/07/19  0814   WBC Thousand/uL 19 70* 12 10*   HEMOGLOBIN g/dL 10 9* 11 7*   HEMATOCRIT % 32 7* 33 9*   PLATELETS Thousands/uL 182 208   NEUTROS PCT %  --  76*   MONOS PCT %  --  6     Results from last 7 days   Lab Units 03/08/19  0500 03/07/19  0814   SODIUM mmol/L 139 135   POTASSIUM mmol/L 4 7 4 4   CHLORIDE mmol/L 111* 104   CO2 mmol/L 17* 24   ANION GAP mmol/L 11 7   BUN mg/dL 39* 27*   CREATININE mg/dL 1 82* 1 14   CALCIUM mg/dL 8 3* 9 4   ALT U/L 13 13   AST U/L 21 17   ALK PHOS U/L 47* 67   ALBUMIN g/dL 2 8* 3 5   TOTAL BILIRUBIN mg/dL 0 40 0 40          Results from last 7 days   Lab Units 03/07/19  0814   INR  1 08   PTT seconds 26     Results from last 7 days   Lab Units 03/07/19  1205 03/07/19  0814   TROPONIN I ng/mL 0 05* 0 03     Results from last 7 days   Lab Units 03/07/19  0814   LACTIC ACID mmol/L 1 4     ABG:  Lab Results   Component Value Date    PHART 7 320 (L) 03/07/2019    JEA6FGF 34 7 (L) 03/07/2019    PO2ART 72 0 (L) 03/07/2019    ZTF0RJD 17 2 (L) 03/07/2019    BEART -7 8 (L) 03/07/2019    SOURCE Radial, Right 03/07/2019     VBG:  Results from last 7 days   Lab Units 03/07/19  1810   ABG SOURCE  Radial, Right     Results from last 7 days   Lab Units 03/07/19  1206 03/07/19  0814   PROCALCITONIN ng/ml 1 34* 0 58*     No results found for: [address] / Data: I have personally reviewed pertinent reports  Code Status: Level 3 - DNAR and DNI    Portions of the record may have been created with voice recognition software  Occasional wrong word or "sound a like" substitutions may have occurred due to the inherent limitations of voice recognition software  Read the chart carefully and recognize, using context, where substitutions have occurred      SIGNATURE: aDnisha Claros MD  DATE: March 8, 2019  TIME: 10:44 AM

## 2019-03-08 NOTE — OCCUPATIONAL THERAPY NOTE
Occupational Therapy Evaluation      Felecia ZOILA Lewisbebeto    3/8/2019    Patient Active Problem List   Diagnosis    Bronchospasm    Renal insufficiency    Type 2 diabetes mellitus with complication, without long-term current use of insulin ([facility] )    Pressure ulcer of left buttock, stage 2    Other dysphagia    Severe sepsis (HCC)    Acute on chronic respiratory failure with hypoxia and hypercapnia (HCC)    Colitis       Past Medical History:   Diagnosis Date    Anemia     CHF (congestive heart failure) ([facility] )     COPD (chronic obstructive pulmonary disease) ([facility] )     home 02 2l    Diabetes [facility] )     DJD (degenerative joint disease)     Gait abnormality     GERD (gastroesophageal reflux disease)     Hyperlipidemia     Hypertension     Stroke [facility])        Past Surgical History:   Procedure Laterality Date    ABDOMINAL SURGERY          03/08/19 0852   Note Type   Note type Eval only   Restrictions/Precautions   Weight Bearing Precautions Per Order No   Other Precautions Fall Risk;Telemetry;Multiple lines;O2;Cognitive; Restraints   Pain Assessment   Pain Assessment [place] FACES   Pain Score No Pain   Home Living   Type of Home [facility]   Home Layout One level;Performs ADLs on one level   Bathroom Shower/Tub Walk-in shower   Bathroom Toilet Raised   Bathroom Equipment Grab bars in shower;Grab bars around toilet   Bathroom Accessibility Accessible via wheelchair;Accessible   [address] [facility]; Wheelchair-manual   Additional Comments Total care reported, mechanical lift   Prior Function   Level of [place] Total dependent   Lives With Facility staff   Receives Help From Personal care attendant   ADL Assistance Needs assistance   IADLs Needs assistance   Falls in the last 6 months   (unknown)   Vocational Retired   Subjective   Subjective no verbal response, eyes remain closed   ADL   Eating Assistance Unable to assess   Grooming Assistance 1  Total Assistance   UB Bathing Assistance 1  Total Assistance   LB Bathing Assistance 1  Total Assistance   UB Dressing Assistance 1  Total Assistance   LB Dressing Assistance 1  Total Assistance   Toileting Assistance  1  Total Assistance   Bed Mobility   Rolling R Unable to assess   Transfers   Sit to Stand Unable to assess   Balance   Static Sitting   (unable to assess)   Activity Tolerance   Activity Tolerance Treatment limited secondary to medical complications (Comment)  (bipap, decreased responsiveness)   Nurse Made Aware yes   RUE Assessment   RUE Assessment   (PROM WFL, no AROM, unable to assess strength)   LUE Assessment   LUE Assessment   (PROM WFL, no AROM, unable to assess strength)   Cognition   Overall Cognitive Status Unable to assess   Arousal/Participation Poorly responsive   Attention JENNIFER   Orientation Level Unable to assess   Memory Unable to assess   Following Commands Unable to follow one step commands   Assessment   Limitation   (pt reportedly D PTA for all daily needs)   Prognosis Guarded   Assessment Pt is a 66 y o  female seen for OT evaluation s/p admit to [facility] on 3/7/2019 w/ Severe sepsis ([address] )  Comorbidities affecting pt's functional performance at time of assessment include: DM and Acute on Chronic Respiratory Failurw with Hypoxia, RI   Personal factors affecting pt at time of IE include:dependent PTA  Prior to admission, pt was reportedly D for self care/daily needs and is mechanical lift transfer  Upon evaluation: Pt is dependent for all needs, poorly responsive, essentially at baseline (functionally) From OT standpoint, recommendation at time of d/c would be return to SNF when medically stable     Goals   Patient Goals unable to stste   Plan   Treatment Interventions   (eval only)   Goal Expiration Date 03/08/19   Treatment Day 0   OT Frequency Eval only   Recommendation   OT Discharge Recommendation   (return to SNF when medically stable)   Barthel Index   Feeding 0   Bathing 0   Grooming Score 0   Dressing Score 0   Bladder Score 0   Bowels Score 0   Toilet Use Score 0   Transfers (Bed/Chair) Score 0   Mobility (Level Surface) Score 0   Stairs Score 0   Barthel Index Score 0        03/08/19 0852   Note Type   Note type Eval only   Restrictions/Precautions   Weight Bearing Precautions Per Order No   Other Precautions Fall Risk;Telemetry;Multiple lines;O2;Cognitive; Restraints   Pain Assessment   Pain Assessment [place] FACES   Pain Score No Pain   Home Living   Type of Home SNF  [facility]   Home Layout One level;Performs ADLs on one level   Bathroom Shower/Tub Walk-in shower   Bathroom Toilet Raised   Bathroom Equipment Grab bars in shower;Grab bars around toilet   Bathroom Accessibility Accessible via wheelchair;Accessible   [address] [facility],[address]; Wheelchair-manual   Additional Comments Total care reported, mechanical lift   Prior Function   Level of [place] Total dependent   Lives With Facility staff   Receives Help From Personal care attendant   ADL Assistance Needs assistance   IADLs Needs assistance   Falls in the last 6 months   (unknown)   Vocational Retired   Subjective   Subjective no verbal response, eyes remain closed   ADL   Eating Assistance Unable to assess   Grooming Assistance 1  Total Assistance   UB Bathing Assistance 1  Total Assistance   LB Bathing Assistance 1  Total Assistance   UB Dressing Assistance 1  Total Assistance   LB Dressing Assistance 1  Total Assistance   Toileting Assistance  1  Total Assistance   Bed Mobility   Rolling R Unable to assess   Transfers   Sit to Stand Unable to assess   Balance   Static Sitting   (unable to assess)   Activity Tolerance   Activity Tolerance Treatment limited secondary to medical complications (Comment)  (bipap, decreased responsiveness)   Nurse Made Aware yes   RUE Assessment   RUE Assessment   (PROM WFL, no AROM, unable to assess strength)   LUE Assessment   LUE Assessment   (PROM WFL, no AROM, unable to assess strength)   Cognition   Overall Cognitive Status Unable to assess   Arousal/Participation Poorly responsive   Attention JENNIFER   Orientation Level Unable to assess   Memory Unable to assess   Following Commands Unable to follow one step commands   Assessment   Limitation   (pt reportedly D PTA for all daily needs)   Prognosis Guarded   Assessment Pt is a 66 y o  female seen for OT evaluation s/p admit to [facility] on 3/7/2019 w/ Severe sepsis ([address] )  Comorbidities affecting pt's functional performance at time of assessment include: DM and Acute on Chronic Respiratory Failurw with Hypoxia, RI   Personal factors affecting pt at time of IE include:dependent PTA  Prior to admission, pt was reportedly D for self care/daily needs and is mechanical lift transfer  Upon evaluation: Pt is dependent for all needs, poorly responsive, essentially at baseline (functionally) From OT standpoint, recommendation at time of d/c would be return to SNF when medically stable     Goals   Patient Goals unable to stste   Plan   Treatment Interventions   (eval only)   Goal Expiration Date 03/08/19   Treatment Day 0   OT Frequency Eval only   Recommendation   OT Discharge Recommendation   (return to SNF when medically stable)   Barthel Index   Feeding 0   Bathing 0   Grooming Score 0   Dressing Score 0   Bladder Score 0   Bowels Score 0   Toilet Use Score 0   Transfers (Bed/Chair) Score 0   Mobility (Level Surface) Score 0   Stairs Score 0   Barthel Index Score 0   Cammie Curiel OT

## 2019-03-08 NOTE — SOCIAL WORK
CM attempted to meet with pt at bedside in ICU but pt unable to participate in evaluation  Pt is a resident of [facility]  CM confirmed with Peter Lara at [facility] that pt is a bed hold at the facility  Discharge plan at this time is for pt to return to [facility] upon discharge  CM will continue to follow to assess for needs going forward  Pt would need BLS transport back to [facility] upon discharge  CM reviewed d/c planning process including the following: identifying help at home, patient preference for d/c planning needs, availability of treatment team to discuss questions or concerns patient and/or family may have regarding understanding medications and recognizing signs and symptoms once discharged  CM also encouraged patient to follow up with all recommended appointments after discharge  Patient advised of importance for patient and family to participate in managing patients medical well being

## 2019-03-08 NOTE — PHYSICIAN ADVISOR
Current patient class: Inpatient  The patient is currently on Hospital Day: 2 at [address]        The patient was admitted to the hospital  on 3/7/19 at [phone] for the following diagnosis:  Shortness of breath [R06 02]  Bronchospasm [J98 01]  Hypoxia [R09 02]  COPD exacerbation ([facility] ) [J44 1]  Sepsis, due to unspecified organism ([facility] ) [A41 9]  Altered mental status, unspecified altered mental status type [R41 82]       There is documentation in the medical record of an expected length of stay of at least 2 midnights  The patient is therefore expected to satisfy the 2 midnight benchmark and given the 2 midnight presumption is appropriate for INPATIENT ADMISSION  Given this expectation of a satisfying stay, CMS instructs us that the patient is most often appropriate for inpatient admission under part A provided medical necessity is documented in the chart  After review of the relevant documentation, labs, vital signs and test results, the patient is appropriate for INPATIENT ADMISSION  Admission to the hospital as an inpatient is a complex decision making process which requires the practitioner to consider the patients presenting complaint, history and physical examination and all relevant testing  With this in mind, in this case, the patient was deemed appropriate for INPATIENT ADMISSION  After review of the documentation and testing available at the time of the admission I concur with this clinical determination of medical necessity  The patient does have an inpatient admission within the previous 30 days  The patient was admitted on 2/14/19 and discharged on 2/21/19 as an inpatient  The patient therefore required readmission review  In this case the patient should be considered a SEPARATE and UNRELATED INPATIENT ADMISSION  The patient had been discharged in stable condition with a completed care plan   There were no unresolved acute medical issues at the time of discharged which would have reasonably been expected to prompt this readmission  Rationale is as follows: The patient is a 66 yrs   Female who presented to the ED at 3/7/2019  8:03 AM with a chief complaint of Shortness of Breath (pt recently treated for pneumonia per NH staff ) and Lethargy     Patient admitted with a report of SOB  She was recently hospitalized for bacteremia secondary to a UTI  On this admission she was noted to meet sepsis criteria and be in acute on chronic res;iratoryh failure and required BiPAP  Imaging studies revealed a RUL consolidation consistent with aspiration pneumonia and is being treated for that process  It would be appropriate to consider this admission as [address] to the previous admission      The patients vitals on arrival were ED Triage Vitals   Temperature Pulse Respirations Blood Pressure SpO2   03/07/19 0805 03/07/19 0805 03/07/19 0805 03/07/19 0805 03/07/19 0805   (!) 96 8 °F (36 °C) 82 (!) 24 (!) 197/86 (!) 83 %      Temp Source Heart Rate Source Patient Position - Orthostatic VS BP Location FiO2 (%)   03/07/19 0805 03/07/19 0805 03/07/19 0805 03/07/19 0805 03/07/19 0812   Temporal Monitor Sitting Right arm 100      Pain Score       03/07/19 0805       No Pain           Past Medical History:   Diagnosis Date    Anemia     [facility]est[facility]     COPD (chronic obstructive pulmonary disease) ([facility])     home 02 2l    Diabetes mellitus ([facility])     DJD (degenerative joint disease)     Gait abnormality     GERD (gastroesophageal reflux disease)     Hyperlipidemia     Hypertension     St[facility] )      Past Surgical History:   Procedure Laterality Date    ABDOMINAL SURGERY             Consults have been placed to:   IP CONSULT TO PHARMACY  IP CONSULT TO MEDICAL CRITICAL CARE  IP CONSULT TO NEPHROLOGY  IP CONSULT TO GASTROENTEROLOGY    Vitals:    03/08/19 1400 03/08/19 1415 03/08/19 1420 03/08/19 1425   BP:       BP Location:       Pulse: Resp:       Temp:       TempSrc:       SpO2: (!) 82% (!) 85% (!) 86% 96%   Weight:       Height:           Most recent labs:    Recent Labs     03/07/19  0814 03/07/19  1205 03/08/19  0500   WBC 12 10*  --  19 70*   HGB 11 7*  --  10 9*   HCT 33 9*  --  32 7*     --  182   K 4 4  --  4 7   CALCIUM 9 4  --  8 3*   BUN 27*  --  39*   CREATININE 1 14  --  1 82*   INR 1 08  --   --    TROPONINI 0 03 0 05*  --    CKTOTAL 20*  --   --    AST 17  --  21   ALT 13  --  13   ALKPHOS 67  --  47*       Scheduled Meds:  Current Facility-Administered Medications:  aspirin 81 mg Oral Daily Gladys Nicole MD    atorvastatin 10 mg Oral Daily Gladys Nicole MD    bisacodyl 10 mg Oral Daily PRN Gladys Nicole MD    cefepime 2,000 mg Intravenous Q12H Gladys Nicole MD Last Rate: 2,000 mg (03/08/19 1215)   heparin (porcine) 5,000 Units Subcutaneous Q8H [address] Digna Reyna MD    insulin glargine 20 Units Subcutaneous HS Gladys Nicole MD    insulin lispro 1-6 Units Subcutaneous Q6H [address] Digna Reyna MD    ipratropium-albuterol 3 mL Nebulization Q6H Gladys Nicole MD    metroNIDAZOLE 500 mg Intravenous Q8H Gladys Nicole MD Last Rate: 500 mg (03/08/19 1208)   sertraline 50 mg Oral Daily Gladys Nicole MD    sodium chloride (PF) 3 mL Intravenous PRN Gladys Nicole MD    sodium chloride 125 mL/hr Intravenous Continuous Gladys Nicole MD Last Rate: 125 mL/hr (03/08/19 1209)   vancomycin 10 mg/kg (Adjusted) Intravenous Q12H Gladys Nicole MD Last Rate: Stopped (03/08/19 1100)     Continuous Infusions:  sodium chloride 125 mL/hr Last Rate: 125 mL/hr (03/08/19 1209)     PRN Meds: bisacodyl    sodium chloride (PF)    Surgical procedures (if appropriate):

## 2019-03-08 NOTE — PLAN OF CARE
Problem: Nutrition/Hydration-ADULT  Goal: Nutrient/Hydration intake appropriate for improving, restoring or maintaining nutritional needs  Description  Monitor and assess patient's nutrition/hydration status for malnutrition (ex- brittle hair, bruises, dry skin, pale skin and conjunctiva, muscle wasting, smooth red tongue, and disorientation)  Collaborate with interdisciplinary team and initiate plan and interventions as ordered  Monitor patient's weight and dietary intake as ordered or per policy  Utilize nutrition screening tool and intervene per policy  Determine patient's food preferences and provide high-protein, high-caloric foods as appropriate  INTERVENTIONS:  - Monitor oral intake, urinary output, labs, and treatment plans  - Assess nutrition and hydration status and recommend course of action  - Evaluate amount of meals eaten  - Assist patient with eating if necessary   - Allow adequate time for meals  - Recommend/ encourage appropriate diets, oral nutritional supplements, and vitamin/mineral supplements  - Order, calculate, and assess calorie counts as needed  - Recommend, monitor, and adjust tube feedings and TPN/PPN based on assessed needs  - Assess need for intravenous fluids  - Provide specific nutrition/hydration education as appropriate  - Include patient/family/caregiver in decisions related to nutrition  Outcome: Not Progressing   Patient is NPO on bipap

## 2019-03-08 NOTE — PROGRESS NOTES
Progress Note Rebecca Bejarano 1940, 66 y o  female MRN: [id number]    Unit/Bed#: ICU 03 Encounter: [id number]    Primary Care Provider: Manuel Christensen DO   Date and time admitted to hospital: 3/7/2019  8:03 AM        * Severe sepsis ([facility] [address] )  Assessment & Plan  · Severe sepsis secondary to aspiration pneumonia, colitis  · Appreciate CT results - pneumonia and colitis noted  · We will treat with broad-spectrum antibiotics, namely vancomycin, cefepime and Flagyl  · Monitor intensive care unit  · After an extensive discussion with the patient's daughter, she notes that she would like to pursue aggressive medical treatment and measures until the point of cardiac arrest, with the understanding the patient will remain DNR/DNI  · Will consult GI in light of colitis noted on CT scan  · Appreciate critical care input    Acute on chronic respiratory failure with hypoxia and hypercapnia (HCC)  Assessment & Plan  · Secondary to aspiration pneumonia  · Titrated off of BiPAP  · DNR/DNI  · Monitor in ICU    Other dysphagia  Assessment & Plan  · Secondary to history of CVA  · NPO for now  · I did discuss the possibility of PEG tube during the previous admission and this time as well, and family preferred not to have PEG tube placed    If no clinical improvement next been for 48 hours, they would like to consider hospice    Colitis  Assessment & Plan  · Noted on CT  · Consult GI  · Continue antibiotics    Pressure ulcer of left buttock, stage 2  Assessment & Plan  · Present on admission  · Wound care    Type 2 diabetes mellitus with complication, without long-term current use of insulin [facility])  Assessment & Plan  No results found for: HGBA1C    Recent Labs     03/07/19  1547 03/08/19  0007 03/08/19  0543 03/08/19  0757   POCGLU 168* 193* 216* 187*       Blood Sugar Average: Last 72 hrs:  (P) 223 [id number]     We will give half dose Lantus while patient is NPO along with sliding scale        VTE Pharmacologic Prophylaxis: Pharmacologic: Heparin    Patient Centered Rounds: I have performed bedside rounds with nursing staff today  Discussions with Specialists or Other Care Team Provider: critical care  Education and Discussions with Family / Patient: daughter over the phone    Time Spent for Care: 45 minutes  More than 50% of total time spent on counseling and coordination of care as described above  Current Length of Stay: 1 day(s)    Current Patient Status: Inpatient   Certification Statement: The patient will continue to require additional inpatient hospital stay due to sepsis    Discharge Plan: pending hospital course    Code Status: Level 3 - DNAR and DNI    Subjective:   Patient seen and examined  No acute events overnight aside from low urine output  Titrated off of bipap    Objective:     Vitals:   Temp (24hrs), Av 5 °F (36 4 °C), Min:96 6 °F (35 9 °C), Max:98 °F (36 7 °C)    Temp:  [96 6 °F (35 9 °C)-98 °F (36 7 °C)] 96 6 °F (35 9 °C)  HR:  [73-95] 95  Resp:  [15-50] 16  BP: ()/(44-59) 124/57  SpO2:  [83 %-100 %] 98 %  Body mass index is 29 46 kg/m²  Input and Output Summary (last 24 hours): Intake/Output Summary (Last 24 hours) at 3/8/2019 1141  Last data filed at 3/8/2019 0601  Gross per 24 hour   Intake    Output 375 ml   Net -375 ml       Physical Exam:     Physical Exam   Constitutional: She appears well-developed and well-nourished  HENT:   Head: Normocephalic and atraumatic  Eyes: Pupils are equal, round, and reactive to light  EOM are normal    Neck: Normal range of motion  Neck supple  Cardiovascular: Normal rate  Pulmonary/Chest: She has rales  Abdominal: Soft  Bowel sounds are normal    Neurological:   Responds to painful stimuli   Skin: Skin is warm  Capillary refill takes less than 2 seconds  Psychiatric:   Unable to assess   Nursing note and vitals reviewed        Additional Data:     Labs:    Results from last 7 days   Lab Units 19  0500 19  4324 WBC Thousand/uL 19 70* 12 10*   HEMOGLOBIN g/dL 10 9* 11 7*   HEMATOCRIT % 32 7* 33 9*   PLATELETS Thousands/uL 182 208   NEUTROS PCT %  --  76*   LYMPHS PCT %  --  15*   MONOS PCT %  --  6   EOS PCT %  --  2     Results from last 7 days   Lab Units 03/08/19  0500   POTASSIUM mmol/L 4 7   CHLORIDE mmol/L 111*   CO2 mmol/L 17*   BUN mg/dL 39*   CREATININE mg/dL 1 82*   CALCIUM mg/dL 8 3*   ALK PHOS U/L 47*   ALT U/L 13   AST U/L 21     Results from last 7 days   Lab Units 03/07/19  0814   INR  1 08     Results from last 7 days   Lab Units 03/08/19  0757 03/08/19  0543 03/08/19  0007 03/07/19  1547 03/07/19  1140 03/07/19  0839   POC GLUCOSE mg/dl 187* 216* 193* 168* 145* 152*           * I Have Reviewed All Lab Data Listed Above  * Additional Pertinent Lab Tests Reviewed:  [address] 66 Admission  Reviewed    Imaging:  Imaging Reports Reviewed Today Include: ct a/p/chest    Recent Cultures (last 7 days):     Results from last 7 days   Lab Units 03/07/19  1209   LEGIONELLA URINARY ANTIGEN  Negative       Last 24 Hours Medication List:     Current Facility-Administered Medications:  aspirin 81 mg Oral Daily Lendon Aase, MD    atorvastatin 10 mg Oral Daily Lendon Aase, MD    bisacodyl 10 mg Oral Daily PRN Lendon Aase, MD    cefepime 2,000 mg Intravenous Q12H Lendon Aase, MD Last Rate: 2,000 mg (03/08/19 0140)   heparin (porcine) 5,000 Units Subcutaneous Q8H [address] Jf Edwards MD    insulin glargine 20 Units Subcutaneous HS Lendon Aase, MD    insulin lispro 1-5 Units Subcutaneous Q6H Alexis Wilkerson PA-C    ipratropium-albuterol 3 mL Nebulization Q6H Lendon Aase, MD    metroNIDAZOLE 500 mg Intravenous Denise Turpin MD Last Rate: Stopped (03/08/19 0447)   sertraline 50 mg Oral Daily Lendon Aase, MD    sodium chloride (PF) 3 mL Intravenous PRN Lendon Aase, MD    sodium chloride 125 mL/hr Intravenous Continuous Lendon Aase, MD    vancomycin 10 mg/kg (Adjusted) Intravenous Q12H Lendon Aase, MD Last Rate: Stopped (03/08/19 0020)        Today, Patient Was Seen By: Milady Arango MD    ** Please Note: Dictation voice to text software may have been used in the creation of this document   **

## 2019-03-09 LAB
ALBUMIN SERPL BCP-MCNC: 2.6 G/DL (ref 3.5–5.7)
ALP SERPL-CCNC: 48 U/L (ref 55–165)
ALT SERPL W P-5'-P-CCNC: 8 U/L (ref 7–52)
ANION GAP SERPL CALCULATED.3IONS-SCNC: 8 MMOL/L (ref 4–13)
AST SERPL W P-5'-P-CCNC: 11 U/L (ref 13–39)
BILIRUB SERPL-MCNC: 0.3 MG/DL (ref 0.2–1)
BUN SERPL-MCNC: 37 MG/DL (ref 7–25)
CALCIUM SERPL-MCNC: 8.1 MG/DL (ref 8.6–10.5)
CHLORIDE SERPL-SCNC: 116 MMOL/L (ref 98–107)
CO2 SERPL-SCNC: 18 MMOL/L (ref 21–31)
CREAT SERPL-MCNC: 1.68 MG/DL (ref 0.6–1.2)
ERYTHROCYTE [DISTWIDTH] IN BLOOD BY AUTOMATED COUNT: 17.3 % (ref 11.5–14.5)
GFR SERPL CREATININE-BSD FRML MDRD: 29 ML/MIN/1.73SQ M
GLUCOSE SERPL-MCNC: 121 MG/DL (ref 65–140)
GLUCOSE SERPL-MCNC: 122 MG/DL (ref 65–140)
GLUCOSE SERPL-MCNC: 124 MG/DL (ref 65–99)
GLUCOSE SERPL-MCNC: 139 MG/DL (ref 65–140)
GLUCOSE SERPL-MCNC: 148 MG/DL (ref 65–140)
GLUCOSE SERPL-MCNC: 163 MG/DL (ref 65–140)
GLUCOSE SERPL-MCNC: 163 MG/DL (ref 65–140)
HCT VFR BLD AUTO: 29.5 % (ref 42–47)
HGB BLD-MCNC: 9.4 G/DL (ref 12–16)
INR PPP: 1.34 (ref 0.9–1.5)
MCH RBC QN AUTO: 32.7 PG (ref 26–34)
MCHC RBC AUTO-ENTMCNC: 31.8 G/DL (ref 31–37)
MCV RBC AUTO: 103 FL (ref 81–99)
PLATELET # BLD AUTO: 149 THOUSANDS/UL (ref 149–390)
PMV BLD AUTO: 9.8 FL (ref 8.6–11.7)
POTASSIUM SERPL-SCNC: 4.2 MMOL/L (ref 3.5–5.5)
PROCALCITONIN SERPL-MCNC: 39.12 NG/ML
PROT SERPL-MCNC: 5.2 G/DL (ref 6.4–8.9)
PROTHROMBIN TIME: 15.6 SECONDS (ref 10.2–13)
RBC # BLD AUTO: 2.87 MILLION/UL (ref 3.9–5.2)
SODIUM SERPL-SCNC: 142 MMOL/L (ref 134–143)
WBC # BLD AUTO: 10.6 THOUSAND/UL (ref 4.8–10.8)

## 2019-03-09 PROCEDURE — 85027 COMPLETE CBC AUTOMATED: CPT | Performed by: INTERNAL MEDICINE

## 2019-03-09 PROCEDURE — 82948 REAGENT STRIP/BLOOD GLUCOSE: CPT

## 2019-03-09 PROCEDURE — 94760 N-INVAS EAR/PLS OXIMETRY 1: CPT

## 2019-03-09 PROCEDURE — 99233 SBSQ HOSP IP/OBS HIGH 50: CPT | Performed by: INTERNAL MEDICINE

## 2019-03-09 PROCEDURE — 94640 AIRWAY INHALATION TREATMENT: CPT

## 2019-03-09 PROCEDURE — 99233 SBSQ HOSP IP/OBS HIGH 50: CPT | Performed by: ANESTHESIOLOGY

## 2019-03-09 PROCEDURE — 84145 PROCALCITONIN (PCT): CPT | Performed by: INTERNAL MEDICINE

## 2019-03-09 PROCEDURE — 80053 COMPREHEN METABOLIC PANEL: CPT | Performed by: INTERNAL MEDICINE

## 2019-03-09 PROCEDURE — 87081 CULTURE SCREEN ONLY: CPT | Performed by: ANESTHESIOLOGY

## 2019-03-09 PROCEDURE — 85610 PROTHROMBIN TIME: CPT | Performed by: INTERNAL MEDICINE

## 2019-03-09 RX ORDER — SODIUM CHLORIDE, SODIUM LACTATE, POTASSIUM CHLORIDE, CALCIUM CHLORIDE 600; 310; 30; 20 MG/100ML; MG/100ML; MG/100ML; MG/100ML
100 INJECTION, SOLUTION INTRAVENOUS CONTINUOUS
Status: DISCONTINUED | OUTPATIENT
Start: 2019-03-09 | End: 2019-03-10

## 2019-03-09 RX ADMIN — INSULIN LISPRO 1 UNITS: 100 INJECTION, SOLUTION INTRAVENOUS; SUBCUTANEOUS at 23:33

## 2019-03-09 RX ADMIN — METRONIDAZOLE 500 MG: 500 INJECTION, SOLUTION INTRAVENOUS at 11:56

## 2019-03-09 RX ADMIN — VANCOMYCIN HYDROCHLORIDE 1500 MG: 1 INJECTION, POWDER, LYOPHILIZED, FOR SOLUTION INTRAVENOUS at 10:23

## 2019-03-09 RX ADMIN — METRONIDAZOLE 500 MG: 500 INJECTION, SOLUTION INTRAVENOUS at 04:45

## 2019-03-09 RX ADMIN — HEPARIN SODIUM 5000 UNITS: 5000 INJECTION, SOLUTION INTRAVENOUS; SUBCUTANEOUS at 21:30

## 2019-03-09 RX ADMIN — HEPARIN SODIUM 5000 UNITS: 5000 INJECTION, SOLUTION INTRAVENOUS; SUBCUTANEOUS at 14:45

## 2019-03-09 RX ADMIN — IPRATROPIUM BROMIDE AND ALBUTEROL SULFATE 3 ML: 2.5; .5 SOLUTION RESPIRATORY (INHALATION) at 07:16

## 2019-03-09 RX ADMIN — SODIUM CHLORIDE, POTASSIUM CHLORIDE, SODIUM LACTATE AND CALCIUM CHLORIDE 100 ML/HR: 600; 310; 30; 20 INJECTION, SOLUTION INTRAVENOUS at 10:22

## 2019-03-09 RX ADMIN — HEPARIN SODIUM 5000 UNITS: 5000 INJECTION, SOLUTION INTRAVENOUS; SUBCUTANEOUS at 05:26

## 2019-03-09 RX ADMIN — INSULIN GLARGINE 20 UNITS: 100 INJECTION, SOLUTION SUBCUTANEOUS at 21:30

## 2019-03-09 RX ADMIN — CEFEPIME HYDROCHLORIDE 2000 MG: 2 INJECTION, SOLUTION INTRAVENOUS at 00:25

## 2019-03-09 RX ADMIN — IPRATROPIUM BROMIDE AND ALBUTEROL SULFATE 3 ML: 2.5; .5 SOLUTION RESPIRATORY (INHALATION) at 13:32

## 2019-03-09 RX ADMIN — CEFEPIME HYDROCHLORIDE 2000 MG: 2 INJECTION, SOLUTION INTRAVENOUS at 11:42

## 2019-03-09 RX ADMIN — CEFEPIME HYDROCHLORIDE 2000 MG: 2 INJECTION, SOLUTION INTRAVENOUS at 23:33

## 2019-03-09 RX ADMIN — IPRATROPIUM BROMIDE AND ALBUTEROL SULFATE 3 ML: 2.5; .5 SOLUTION RESPIRATORY (INHALATION) at 19:37

## 2019-03-09 RX ADMIN — METRONIDAZOLE 500 MG: 500 INJECTION, SOLUTION INTRAVENOUS at 20:00

## 2019-03-09 NOTE — ASSESSMENT & PLAN NOTE
No results found for: HGBA1C    Recent Labs     03/08/19  1558 03/08/19  2158 03/09/19  0027 03/09/19  0749   POCGLU 191* 138 139 121       Blood Sugar Average: Last 72 hrs:  (P) 167 [id number]     We will give half dose Lantus while patient is NPO along with sliding scale

## 2019-03-09 NOTE — PROGRESS NOTES
Progress Note - Nephrology   Corey Bejarano 66 y o  female MRN: [id number]  Unit/Bed#: ICU 03 Encounter: [id number]    A/P:  1  Acute kidney injury: partly due to volume depletion as she is not taking po  She had an improvement in renal function with IVF  However, she is septic and may have ATN  Urine electrolyte have been ordered  Continue NS at 125 ml/hr  The family has declined a peg  2  Sepsis: probably due to aspiration pneumonia  She is receiving antibiotics  3  Dysphagia: comfort care being offered as patient's family do not want a peg   4  Hyperchloremic metabolic acidosis: continue IVF and obtain serial studies  Follow up reason for today's visit: acute kidney injury    Severe sepsis [facility])    Patient Active Problem List   Diagnosis    Bronchospasm    Renal insufficiency    Type 2 diabetes mellitus with complication, without long-term current use of insulin ([address] )    Pressure ulcer of left buttock, stage 2    Other dysphagia    Severe sepsis (HCC)    Acute on chronic respiratory failure with hypoxia and hypercapnia (HCC)    Colitis         Subjective:   Nonverbal and cannot offer ROS    Objective:     Vitals: Blood pressure 134/60, pulse 85, temperature 98 5 °F (36 9 °C), temperature source Tympanic, resp  rate (!) 35, height 5' 6" (1 676 m), weight 83 kg (183 lb), SpO2 97 %, not currently breastfeeding  ,Body mass index is 29 54 kg/m²  Weight (last 2 days)     Date/Time   Weight    03/09/19 0500   83 (183)    03/07/19 1030   82 8 (182 5)    03/07/19 0805   81 2 (179 01)                Intake/Output Summary (Last 24 hours) at 3/9/2019 0845  Last data filed at 3/9/2019 0600  Gross per 24 hour   Intake 8274 59 ml   Output 1275 ml   Net 6999 59 ml     I/O last 3 completed shifts: In: 8274 6 [I V :2206 3; IV Piggyback:6068 3]  Out: 1350 [Urine:1350]    Urethral Catheter 16 Fr   (Active)   Amt returned on insertion(mL) 200 mL 3/7/2019  8:40 AM   Site Assessment Clean 3/7/2019  8:40 AM Collection Container Standard drainage bag 3/7/2019  8:40 AM   Securement Method Securing device (Describe) 3/9/2019  4:00 AM   Output (mL) 200 mL 3/9/2019  6:00 AM       Physical Exam: /60 (BP Location: Right arm)   Pulse 85   Temp 98 5 °F (36 9 °C) (Tympanic)   Resp (!) 35   Ht 5' 6" (1 676 m)   Wt 83 kg (183 lb)   LMP  (LMP Unknown)   SpO2 97%   Breastfeeding? No   BMI 29 54 kg/m²     General Appearance:    Lethargic  Opens eyes to verbal and tactile prodding  Is nonverbal   Head:    Normocephalic, without obvious abnormality, atraumatic   Eyes:    Conjunctiva/corneas clear   Ears:    Normal external ears   Nose:   Nares normal, septum midline, mucosa normal, no drainage    or sinus tenderness   Throat:   Lips, mucosa, and tongue normal; teeth and gums normal   Neck:   Supple, symmetrical, trachea midline, no adenopathy;        thyroid:  No enlargement/tenderness/nodules; no carotid    bruit or JVD   Back:     Symmetric, no curvature, ROM normal, no CVA tenderness   Lungs:     Exp rhonchi R>>L, respirations unlabored   Chest wall:    No tenderness or deformity   Heart:    Regular rate and rhythm, S1 and S2 normal, no murmur, rub   or gallop   Abdomen:     Soft, non-tender, bowel sounds active   Extremities:   Extremities normal, atraumatic, no cyanosis or edema   Skin:   Skin color, texture, turgor normal, no rashes or lesions   Lymph nodes:   Cervical normal   Neurologic:   CNII-XII not assessed            Lab, Imaging and other studies: I have personally reviewed pertinent labs    CBC:   Lab Results   Component Value Date    WBC 10 60 03/09/2019    HGB 9 4 (L) 03/09/2019    HCT 29 5 (L) 03/09/2019     (H) 03/09/2019     03/09/2019    MCH 32 7 03/09/2019    MCHC 31 8 03/09/2019    RDW 17 3 (H) 03/09/2019    MPV 9 8 03/09/2019     CMP:   Lab Results   Component Value Date    K 4 2 03/09/2019     (H) 03/09/2019    CO2 18 (L) 03/09/2019    BUN 37 (H) 03/09/2019    CREATININE 1 68 (H) 03/09/2019    CALCIUM 8 1 (L) 03/09/2019    AST 11 (L) 03/09/2019    ALT 8 03/09/2019    ALKPHOS 48 (L) 03/09/2019    EGFR 29 03/09/2019         Results from last 7 days   Lab Units 03/09/19  0425 03/08/19  1634 03/08/19  0500 03/07/19  0814   POTASSIUM mmol/L 4 2 4 4 4 7 4 4   CHLORIDE mmol/L 116* 113* 111* 104   CO2 mmol/L 18* 18* 17* 24   BUN mg/dL 37* 41* 39* 27*   CREATININE mg/dL 1 68* 1 87* 1 82* 1 14   CALCIUM mg/dL 8 1* 8 4* 8 3* 9 4   ALK PHOS U/L 48*  --  47* 67   ALT U/L 8  --  13 13   AST U/L 11*  --  21 17         Phosphorus: No results found for: PHOS  Magnesium: No results found for: MG  Urinalysis: No results found for: COLORU, CLARITYU, SPECGRAV, PHUR, LEUKOCYTESUR, NITRITE, PROTEINUA, GLUCOSEU, KETONESU, BILIRUBINUR, BLOODU  Ionized Calcium: No results found for: CAION  Coagulation:   Lab Results   Component Value Date    INR 1 34 03/09/2019     Troponin: No results found for: TROPONINI  ABG:   Lab Results   Component Value Date    PHART 7 280 (L) 03/08/2019    QZE4CQF 37 8 03/08/2019    PO2ART 99 0 03/08/2019    DHZ0VSJ 17 3 (L) 03/08/2019    BEART -8 3 (L) 03/08/2019    SOURCE Radial, Right 03/08/2019     Radiology review:     IMAGING  Procedure: Ct Chest Abdomen Pelvis Wo Contrast    Result Date: 3/7/2019  Narrative: CT CHEST, ABDOMEN AND PELVIS WITHOUT IV CONTRAST INDICATION:   Pneumonia complicated / unresolved Shortness of breath  COMPARISON:  August 22, 2017 TECHNIQUE: CT examination of the chest, abdomen and pelvis was performed without intravenous contrast   Axial, sagittal, and coronal 2D reformatted images were created from the source data and submitted for interpretation  Radiation dose length product (DLP) for this visit:  898 7 mGy-cm   This examination, like all CT scans performed in the [facility], was performed utilizing techniques to minimize radiation dose exposure, including the use of iterative reconstruction and automated exposure control   Enteric contrast was not administered  FINDINGS: CHEST LUNGS:  The trachea and central bronchial tree are patent  Consolidation is seen in the right upper lobe  Airspace opacities are seen within the lung bases  PLEURA:  Unremarkable  HEART/GREAT VESSELS:  Atherosclerotic changes are noted in thoracic aorta and coronary arteries  MEDIASTINUM AND EVELYN:  Subcentimeter mediastinal lymph nodes are seen  Limited evaluation the hilar region due to lack of IV contrast CHEST WALL AND LOWER NECK: There is a 2 2 cm left adrenal gland nodule  Incidental discovery of one or more thyroid nodule(s) measuring more than 1 5 cm and without suspicious features is noted in this patient who is above 28years old; according to guidelines published in the February 2015 white paper on incidental thyroid nodules in the Journal of the [employer] of Radiology [facility]), further characterization with thyroid ultrasound is recommended  ABDOMEN LIVER/BILIARY TREE:  Unremarkable  GALLBLADDER:  Gallbladder is not visualized and may be contracted  SPLEEN:  Unremarkable  PANCREAS:  Unremarkable  ADRENAL GLANDS:  Unremarkable  KIDNEYS/URETERS:  No evidence of hydronephrosis 8 mm calcification in the left renal pelvis is noted  STOMACH AND BOWEL:  Liquid stool is seen within the colon  Thickening of the rectal wall with fecal material is noted  Colonic diverticulosis without evidence of acute diverticulitis is seen  APPENDIX:  Not visualized  ABDOMINOPELVIC CAVITY:  Small amount of free fluid in the pelvis  VESSELS:  Atherosclerotic changes are present  No evidence of aneurysm  PELVIS REPRODUCTIVE ORGANS:  Patient is status post hysterectomy  URINARY BLADDER:  Ott catheter is seen within urinary bladder  Mild thickening of the urinary bladder wall is seen  ABDOMINAL WALL/INGUINAL REGIONS:  Multiple injection granulomas are seen in the soft tissues  OSSEOUS STRUCTURES:  Prior extensive spinal surgery is seen  Right ORIF of the femur is noted  Diffuse osteopenia is seen  Impression: Consolidation in the right upper lobe with scattered airspace opacity in lungs suggestive of infection  Follow-up to resolution is recommended  Thickening of the rectal wall with surrounding inflammatory changes  Findings may represent stercoral colitis  Follow-up with GI is recommended  Thickening of the urinary bladder wall which may represent cystitis  Workstation performed: [id number]     Procedure: Xr Chest Portable    Result Date: 3/8/2019  Narrative: CHEST INDICATION:   respiratory failure  Congestive failure  COPD  COMPARISON:  March 7, 2019  EXAM PERFORMED/VIEWS:  XR CHEST PORTABLE FINDINGS: Cardiomediastinal silhouette appears unremarkable  Small right costophrenic angle effusion with overlying density in the medial right lung base which could represent atelectasis or pneumonia  No pneumothorax  No left pleural effusion  Evidence of thoracolumbar spinal fusion  No acute osseous abnormality noted  Impression: Small right costophrenic angle effusion with overlying density in the medial right lung base which could represent atelectasis or pneumonia  The study was marked in [facility] for immediate notification  Workstation performed: [id number]     Procedure: Xr Chest 1 View Portable    Result Date: 3/7/2019  Narrative: CHEST INDICATION:   cp  COMPARISON:  2/14/2019 EXAM PERFORMED/VIEWS:  XR CHEST PORTABLE Images: 1 FINDINGS: Cardiomediastinal silhouette appears unremarkable  Chronic vague left lower lobe opacity with adjacent pleural thickening upper lobe and right lung remain clear  No pneumothorax  Evidence of thoracolumbar spinal fusion  Impression: Chronic vague left lower lobe opacity with adjacent pleural thickening  Workstation performed: [id number]     Procedure: Ct Head Without Contrast    Result Date: 3/7/2019  Narrative: CT BRAIN - WITHOUT CONTRAST INDICATION:   Confusion/delirium, altered LOC, unexplained   COMPARISON:  February 14, 2019 TECHNIQUE:  CT examination of the brain was performed  In addition to axial images, coronal 2D reformatted images were created and submitted for interpretation  Radiation dose length product (DLP) for this visit:  872 mGy-cm   This examination, like all CT scans performed in the [facility], was performed utilizing techniques to minimize radiation dose exposure, including the use of iterative reconstruction and automated exposure control  IMAGE QUALITY:  Diagnostic  FINDINGS: PARENCHYMA:  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  Chronic infarct seen in the right basal ganglionic region involving the predominant and head of the caudate nucleus  Chronic infarct seen in the left basal ganglionic region  Moderate periventricular and white matter hypodensity seen related to chronic small vessel ischemic changes VENTRICLES AND EXTRA-AXIAL SPACES:  Dilation of the ventricle system seen which is out of proportion to that of simple cyst VISUALIZED ORBITS AND PARANASAL SINUSES:  Mucosal thickening seen in the both maxillary sinuses Soft tissue density seen within the left external auditory madhavi Mucosal thickening seen in the sphenoid sinus, ethmoidal air cells and maxillary sinuses, chronic Mild soft tissue density seen within the right external auditory canal l CALVARIUM AND EXTRACRANIAL SOFT TISSUES:  No gross lytic lesion seen     Impression: No acute intracranial hemorrhage seen No new hypodense area seen within the brain parenchyma to suggest territorial infarct Bilateral chronic deep infarcts in the basal ganglionic region, chronic right cerebellar infarct Moderate to periventricular and white matter hypodensity related to chronic small vessel ischemic changes Soft tissue density seen in the left external auditory canal with complete opacification and without any erosive changes  Suggest direct visual inspection and clinical evaluation for definite characterization    This is unchanged, statistically this may be due to cerumen   Mild soft tissue density seen within the right effusion noted  I personally discussed this study with Abdiaziz Morales on 3/7/2019 at 9:58 AM   Workstation performed: [id number]       Current Facility-Administered Medications   Medication Dose Route Frequency    aspirin (ECOTRIN LOW STRENGTH) EC tablet 81 mg  81 mg Oral Daily    atorvastatin (LIPITOR) tablet 10 mg  10 mg Oral Daily    bisacodyl (DULCOLAX) EC tablet 10 mg  10 mg Oral Daily PRN    cefepime (MAXIPIME) IVPB (premix) 2,000 mg  2,000 mg Intravenous Q12H    heparin (porcine) subcutaneous injection 5,000 Units  5,000 Units Subcutaneous Q8H [address]    insulin glargine (LANTUS) subcutaneous injection 20 Units 0 2 mL  20 Units Subcutaneous HS    insulin lispro (HumaLOG) 100 units/mL subcutaneous injection 1-6 Units  1-6 Units Subcutaneous Q6H [address]    ipratropium-albuterol (DUO-NEB) 0 5-2 5 mg/3 mL inhalation solution 3 mL  3 mL Nebulization Q6H While awake    metroNIDAZOLE (FLAGYL) IVPB (premix) 500 mg  500 mg Intravenous Q8H    sertraline (ZOLOFT) tablet 50 mg  50 mg Oral Daily    sodium chloride (PF) 0 9 % injection 3 mL  3 mL Intravenous PRN    sodium chloride 0 9 % infusion  125 mL/hr Intravenous Continuous    vancomycin (VANCOCIN) 1,500 mg in sodium chloride 0 9 % 250 mL IVPB  17 5 mg/kg Intravenous Q24H     Medications Discontinued During This Encounter   Medication Reason    sodium chloride 0 9 % bolus 2,000 mL     vancomycin (VANCOCIN) IVPB (premix) 1,000 mg Dose adjustment    vancomycin (VANCOCIN) 750 mg in sodium chloride 0 9 % 250 mL IV piggyback Dose adjustment    metoprolol tartrate (LOPRESSOR) tablet 25 mg     insulin lispro (HumaLOG) 100 units/mL subcutaneous injection 1-5 Units     insulin lispro (HumaLOG) 100 units/mL subcutaneous injection 1-5 Units     enoxaparin (LOVENOX) subcutaneous injection 30 mg     insulin lispro (HumaLOG) 100 units/mL subcutaneous injection 1-5 Units     ipratropium-albuterol (DUO-NEB) 0 5-2 5 mg/3 mL inhalation solution 3 mL     vancomycin (VANCOCIN) 750 mg in sodium chloride 0 9 % 250 mL IVPB     vancomycin (VANCOCIN) 750 mg in sodium chloride 0 9 % 250 mL IV piggyback Dose adjustment       Rakesh Trevizo MD

## 2019-03-09 NOTE — PROGRESS NOTES
Progress Note Stacey Bejarano 1940, 66 y o  female MRN: [id number]    Unit/Bed#: ICU 03 Encounter: [id number]    Primary Care Provider: Ashley Noel DO   Date and time admitted to hospital: 3/7/2019  8:03 AM        * Severe sepsis ([address] )  Assessment & Plan  · Severe sepsis secondary to aspiration pneumonia, colitis  · Appreciate CT results - pneumonia and colitis noted  · We will treat with broad-spectrum antibiotics, namely vancomycin, cefepime and Flagyl pending culture results  · Leukocytosis improving  · Monitor intensive care unit  · After an extensive discussion with the patient's daughter, she notes that she would like to pursue aggressive medical treatment and measures until the point of cardiac arrest, with the understanding the patient will remain DNR/DNI  · Appreciate GI input  · Appreciate critical care input    Acute on chronic respiratory failure with hypoxia and hypercapnia (HCC)  Assessment & Plan  · Secondary to aspiration pneumonia  · Titrated off of BiPAP  · DNR/DNI  · Monitor in ICU    Other dysphagia  Assessment & Plan  · Secondary to history of CVA  · NPO for now  · I did discuss the possibility of PEG tube during the previous admission and this time as well, and family preferred not to have PEG tube placed    If no clinical improvement next been for 48 hours, they would like to consider hospice  · Speech and swallow eval    Colitis  Assessment & Plan  · Noted on CT  · Appreciate GI input - discussed with daughter whether not she would like a colonoscopy/flex sig  · Continue antibiotics    Pressure ulcer of left buttock, stage 2  Assessment & Plan  · Present on admission  · Wound care    Type 2 diabetes mellitus with complication, without long-term current use of insulin [facility])  Assessment & Plan  No results found for: HGBA1C    Recent Labs     03/08/19  1558 03/08/19  2158 03/09/19  0027 03/09/19  0749   POCGLU 191* 138 139 121       Blood Sugar Average: Last 72 hrs:  (P) 470 [id number]     We will give half dose Lantus while patient is NPO along with sliding scale        VTE Pharmacologic Prophylaxis: Pharmacologic: Heparin    Patient Centered Rounds: I have performed bedside rounds with nursing staff today  Discussions with Specialists or Other Care Team Provider: critical care, gi  Education and Discussions with Family / Patient: daughter over the phone    Time Spent for Care: 45 minutes  More than 50% of total time spent on counseling and coordination of care as described above  Current Length of Stay: 2 day(s)    Current Patient Status: Inpatient   Certification Statement: The patient will continue to require additional inpatient hospital stay due to aspiration pneumonia    Discharge Plan: pending hospital course    Code Status: Level 3 - DNAR and DNI    Subjective:   Patient seen examined  A bit more alert this morning    Objective:     Vitals:   Temp (24hrs), Av °F (36 7 °C), Min:97 3 °F (36 3 °C), Max:98 6 °F (37 °C)    Temp:  [97 3 °F (36 3 °C)-98 6 °F (37 °C)] 98 6 °F (37 °C)  HR:  [80-98] 93  Resp:  [14-58] 25  BP: (120-159)/(57-69) 131/62  SpO2:  [81 %-100 %] 93 %  Body mass index is 29 54 kg/m²  Input and Output Summary (last 24 hours): Intake/Output Summary (Last 24 hours) at 3/9/2019 0956  Last data filed at 3/9/2019 0600  Gross per 24 hour   Intake 8274 59 ml   Output 1275 ml   Net 6999 59 ml       Physical Exam:     Physical Exam   Constitutional: She appears well-developed  HENT:   Head: Normocephalic and atraumatic  Eyes: Pupils are equal, round, and reactive to light  EOM are normal    Neck: Normal range of motion  Neck supple  Cardiovascular: Normal rate and regular rhythm  Pulmonary/Chest: Effort normal  No stridor  She has rales  Abdominal: Soft  Bowel sounds are normal    Musculoskeletal: Normal range of motion  Neurological: She is alert  Answers simple questions   Skin: Skin is warm  She is not diaphoretic  Psychiatric:   Unable to assess   Nursing note and vitals reviewed  Additional Data:     Labs:    Results from last 7 days   Lab Units 03/09/19  0425  03/07/19  0814   WBC Thousand/uL 10 60   < > 12 10*   HEMOGLOBIN g/dL 9 4*   < > 11 7*   HEMATOCRIT % 29 5*   < > 33 9*   PLATELETS Thousands/uL 149   < > 208   NEUTROS PCT %  --   --  76*   LYMPHS PCT %  --   --  15*   MONOS PCT %  --   --  6   EOS PCT %  --   --  2    < > = values in this interval not displayed  Results from last 7 days   Lab Units 03/09/19  0425   POTASSIUM mmol/L 4 2   CHLORIDE mmol/L 116*   CO2 mmol/L 18*   BUN mg/dL 37*   CREATININE mg/dL 1 68*   CALCIUM mg/dL 8 1*   ALK PHOS U/L 48*   ALT U/L 8   AST U/L 11*     Results from last 7 days   Lab Units 03/09/19  0425   INR  1 34     Results from last 7 days   Lab Units 03/09/19  0749 03/09/19  0027 03/08/19  2158 03/08/19  1558 03/08/19  1205 03/08/19  0757 03/08/19  0543 03/08/19  0007 03/07/19  1547 03/07/19  1140 03/07/19  0839   POC GLUCOSE mg/dl 121 139 138 191* 188* 187* 216* 193* 168* 145* 152*           * I Have Reviewed All Lab Data Listed Above  * Additional Pertinent Lab Tests Reviewed: [facility] Admission  Reviewed    Imaging:  Imaging Reports Reviewed Today Include: n/a    Recent Cultures (last 7 days):     Results from last 7 days   Lab Units 03/07/19  1209 03/07/19  0815 03/07/19  3483   BLOOD CULTURE   --  No Growth at 24 hrs  No Growth at 24 hrs     LEGIONELLA URINARY ANTIGEN  Negative  --   --        Last 24 Hours Medication List:     Current Facility-Administered Medications:  aspirin 81 mg Oral Daily Gabriela Miller MD    atorvastatin 10 mg Oral Daily Gabriela Miller MD    bisacodyl 10 mg Oral Daily PRN Gabriela Miller MD    cefepime 2,000 mg Intravenous Q12H Gabriela Miller MD Last Rate: Stopped (03/09/19 0055)   heparin (porcine) 5,000 Units Subcutaneous Q8H [facility] Eliza Valderrama MD    insulin glargine 20 Units Subcutaneous HS Gabriela Miller MD    insulin lispro 1-6 Units Subcutaneous Q6H Radha Myers MD    ipratropium-albuterol 3 mL Nebulization Q6H While awake Adalgisa Frey MD    lactated ringers 100 mL/hr Intravenous Continuous Vidya Dukes MD    metroNIDAZOLE 500 mg Intravenous Nkechi Jones MD Last Rate: Stopped (03/09/19 0515)   sertraline 50 mg Oral Daily Lendon Aase, MD    sodium chloride (PF) 3 mL Intravenous PRN Lendon Aase, MD    vancomycin 17 5 mg/kg Intravenous Q24H Adalgisa Frey MD         Today, Patient Was Seen By: Lendon Aase, MD    ** Please Note: Dictation voice to text software may have been used in the creation of this document   **

## 2019-03-09 NOTE — NURSING NOTE
VS and sats stable  Remains on 6L/min  Incontinent of stool  Ott patent  Moist NPC  Sleeping when undisturbed  Opens eyes, but does not focus or follow commands

## 2019-03-09 NOTE — SPEECH THERAPY NOTE
Speech/Language Pathology Progress Note    Patient Name: Caleb Joe  [id number] Date: 3/9/2019     ST consult received  Pt currently NPO  Spoke with RN, and pt is minimally responsive at this time  Baseline diet upon previous admission was puree and HTL  If RN feels pt becomes alert enough, she will attempt RN aspiration risk screen using puree/HTL  If pt passes screen, OK to initiate puree/HTL diet  If she does not pass or does not become alert for PO trials, continue NPO and SLP will assess tomorrow  SLP can be reached via tiger text or at [phone]

## 2019-03-09 NOTE — NURSING NOTE
Daughter in at bedside times 2hrs  Patient opening eyes on/off; answering daughter occasionally  Remains NPO status for now til more awake/alert  IV fluids and antibiotics to continue as ordered  Rails up times 4; safety maintained

## 2019-03-09 NOTE — NURSING NOTE
Patient repositioned to left side with pillow to prop  Small BM ; loose and runny  Alleyvn dressing intact to sacrum  Bedrails up times 4; safety maintained

## 2019-03-09 NOTE — ASSESSMENT & PLAN NOTE
· Severe sepsis secondary to aspiration pneumonia, colitis  · Appreciate CT results - pneumonia and colitis noted  · We will treat with broad-spectrum antibiotics, namely vancomycin, cefepime and Flagyl pending culture results  · Leukocytosis improving  · Monitor intensive care unit  · After an extensive discussion with the patient's daughter, she notes that she would like to pursue aggressive medical treatment and measures until the point of cardiac arrest, with the understanding the patient will remain DNR/DNI  · Appreciate GI input  · Appreciate critical care input

## 2019-03-09 NOTE — NURSING NOTE
Resting to right side with pillow to prop  B/L lower legs elevated to pillow  Will remain NPO status; IV fluids continuing

## 2019-03-09 NOTE — PROGRESS NOTES
Critical Care Interval Progress Note     Aidan Bejarano 66 y o  female MRN: [id number]    Unit/Bed#: ICU 03 Encounter: [id number]    Impression:  Principal Problem:    Severe sepsis ([address] )  Active Problems:    Type 2 diabetes mellitus with complication, without long-term current use of insulin (HCC)    Pressure ulcer of left buttock, stage 2    Other dysphagia    Acute on chronic respiratory failure with hypoxia and hypercapnia (HCC)    Colitis  Resolved Problems:    * No resolved hospital problems  *        Plan:  I independently examined the patient, reviewed all laboratory and imaging studies as well as all input from consultants  The patient's care was discussed with the hospitalist     We are managing:    Severe sepsis secondary to right upper lobe pneumonia - Etiology likely aspiration given history of dysphagia  Continue empiric vancomycin, cefepime and metronidazole  Screen for MRSA to potentially allow de-escalation from vancomycin  Trend procalcitonin and leukocytosis  Discontinue antibiotics once procalcitonin has decreased to 20% of peak  Follow blood cultures, no growth to date  Toxic metabolic encephalopathy - Improved  Now communicating verbally  Likely secondary to sepsis  Anticipate continued improvement as patient recovers from pneumonia  Frequent re-orientation  Open blinds and keep lights on during day to maintain sleep-wake cycle  Colitis - GI following, input appreciated  Acute on chronic combined hypoxemic and hypercarbic respiratory failure - Weaned to high flow nasal cannula yesterday  Continue to titrate down oxygen as tolerated  Hx of CVA with resultant dysphagia - Obtain formal speech and swallow eval  Family previously declined PEG  Hypernatremia - Encourage free water intake once cleared to take PO  Hyperchloremic metabolic acidosis - Iatrogenic  Exchange normal saline for LR  RUTH ANN - Improving with volume resuscitation  Monitor urine output   Renal input appreciated  Sacral wound - Local wound care  DM-II - SSI advanced to algorithm 3 yesterday  Continue lantus at current dose pending greater PO intake  PPX - SQH, SCDs  No indication for SUP  Access - PIV   ______________________________________________________________________    Chief Complaint: Tachypnea, altered mental status    Recent Events / Nursing Concern: Improved urine output, 1275mL over preceding 24 hours  More responsive this morning, able to track and answer questions  Subjective: States, "good morning," and "I'm not doing too good " Answers "I don't know" to most questions  Vitals:   Vitals:    19 0600 19 0700 19 0718 19 0800   BP: 124/59 134/60  131/62   BP Location: Right arm Right arm  Right arm   Pulse: 80 85  93   Resp: 22 (!) 35  (!) 25   Temp:    98 6 °F (37 °C)   TempSrc:    Tympanic   SpO2: 98% 98% 97% 93%   Weight:       Height:                 Temperature: Temp (24hrs), Av °F (36 7 °C), Min:97 3 °F (36 3 °C), Max:98 6 °F (37 °C)  Current: Temperature: 98 6 °F (37 °C)    Hemodynamic Monitoring:  N/A       Respiratory:  SpO2 Device: O2 Device: Nasal cannula  O2 Flow Rate (L/min): 5 L/min    Physical Exam:  Physical Exam  General: No acute distress  Alert  Tracks  Answers, "I don't know" to most questions, but states, "I'm not doing too good "  CV: Regular rhythm, normal rate  No murmur audible  Pulm: Ronchi audible on right  Breath sounds otherwise diminished  Abdomen: Obese, soft, non-distended, non-tender to palpation  No rebound or guarding  Ext: Warm without edema  Allergies:    Allergies   Allergen Reactions    Codeine     Demerol [Meperidine]     Nsaids     Protonix [Pantoprazole]        Medications:   Scheduled Meds:  Current Facility-Administered Medications:  aspirin 81 mg Oral Daily Milady Arango MD    atorvastatin 10 mg Oral Daily Milady Arango MD    bisacodyl 10 mg Oral Daily PRN Milady Arango MD    cefepime 2,000 mg Intravenous Ewa Amezquita MD Last Rate: Stopped (03/09/19 0055)   heparin (porcine) 5,000 Units Subcutaneous [facility] Eliza Valderrama MD    insulin glargine 20 Units Subcutaneous HS Gabriela Miller MD    insulin lispro 1-6 Units Subcutaneous Q6H [address] Eliza Valderrama MD    ipratropium-albuterol 3 mL Nebulization Q6H While awake Porfirio Clemente MD    metroNIDAZOLE 500 mg Intravenous Hoang Oliver MD Last Rate: Stopped (03/09/19 0515)   sertraline 50 mg Oral Daily Gabriela Miller MD    sodium chloride (PF) 3 mL Intravenous PRN Gabriela Miller MD    sodium chloride 125 mL/hr Intravenous Continuous Gabriela Miller MD Last Rate: 125 mL/hr (03/09/19 0600)   vancomycin 17 5 mg/kg Intravenous Q24H Porfirio Clemente MD      Continuous Infusions:  sodium chloride 125 mL/hr Last Rate: 125 mL/hr (03/09/19 0600)     PRN Meds:    bisacodyl 10 mg Daily PRN   sodium chloride (PF) 3 mL PRN       Labs:   Results from last 7 days   Lab Units 03/09/19  0425 03/08/19  0500 03/07/19  0814   WBC Thousand/uL 10 60 19 70* 12 10*   HEMOGLOBIN g/dL 9 4* 10 9* 11 7*   HEMATOCRIT % 29 5* 32 7* 33 9*   PLATELETS Thousands/uL 149 182 208   NEUTROS PCT %  --   --  76*   MONOS PCT %  --   --  6     Results from last 7 days   Lab Units 03/09/19  0425 03/08/19  1634 03/08/19  0500 03/07/19  0814   SODIUM mmol/L 142 140 139 135   POTASSIUM mmol/L 4 2 4 4 4 7 4 4   CHLORIDE mmol/L 116* 113* 111* 104   CO2 mmol/L 18* 18* 17* 24   ANION GAP mmol/L 8 9 11 7   BUN mg/dL 37* 41* 39* 27*   CREATININE mg/dL 1 68* 1 87* 1 82* 1 14   CALCIUM mg/dL 8 1* 8 4* 8 3* 9 4   ALT U/L 8  --  13 13   AST U/L 11*  --  21 17   ALK PHOS U/L 48*  --  47* 67   ALBUMIN g/dL 2 6*  --  2 8* 3 5   TOTAL BILIRUBIN mg/dL 0 30  --  0 40 0 40          Results from last 7 days   Lab Units 03/09/19  0425 03/07/19  0814   INR  1 34 1 08   PTT seconds  --  26     Results from last 7 days   Lab Units 03/07/19  1205 03/07/19  0814   TROPONIN I ng/mL 0 05* 0 03     Results from last 7 days   Lab Units 03/08/19  1643 03/07/19  0814   LACTIC ACID mmol/L 2 1* 1 4     ABG:  Lab Results   Component Value Date    PHART 7 280 (L) 03/08/2019    JAK3PTY 37 8 03/08/2019    PO2ART 99 0 03/08/2019    JRQ9ZYX 17 3 (L) 03/08/2019    BEART -8 3 (L) 03/08/2019    SOURCE Radial, Right 03/08/2019     VBG:  Results from last 7 days   Lab Units 03/08/19  1103   ABG SOURCE  Radial, Right     Results from last 7 days   Lab Units 03/08/19  0500 03/07/19  1206 03/07/19  0814   PROCALCITONIN ng/ml 66 81* 1 34* 0 58*     Vancomycin Tr   Date Value Ref Range Status   03/08/2019 20 7 (HH) 5 0 - 12 0 ug/mL Final        Diagnostic Imaging / Data: I have personally reviewed pertinent reports  Code Status: Level 3 - DNAR and DNI    Portions of the record may have been created with voice recognition software  Occasional wrong word or "sound a like" substitutions may have occurred due to the inherent limitations of voice recognition software  Read the chart carefully and recognize, using context, where substitutions have occurred      SIGNATURE: Gennaro Antunez MD  DATE: March 9, 2019  TIME: 9:00 AM

## 2019-03-09 NOTE — ASSESSMENT & PLAN NOTE
· Noted on CT  · Appreciate GI input - discussed with daughter whether not she would like a colonoscopy/flex sig  · Continue antibiotics

## 2019-03-09 NOTE — CONSULTS
Vancomycin IV Pharmacy-to-Dose Consultation    Hoda Landers is a 66 y o  female who is currently receiving Vancomycin IV with management by the Pharmacy Consult service  Assessment/Plan:  The patient was reviewed  Renal function is stable and no signs or symptoms of nephrotoxicity and/or infusion reactions were documented in the chart  Based on today?s assessment, continue current vancomycin (day # 3) dosing of 1500mg IV Q 24 hrs with a plan for a trough to be drawn at 0930 on 3-11-19  We will continue to follow the patient?s culture results and clinical progress daily      Lc Rubin, Pharmacist

## 2019-03-09 NOTE — NURSING NOTE
6L/min HFNC maintained  VS and sats stable  LA IV infusing without incident  Ott patent  Sleeping when undisturbed  Arouses to name; but only focusses momentarily  No verbal communication; no attempts to follow commands; flexes extremities slightly to noxious stimuli

## 2019-03-09 NOTE — ASSESSMENT & PLAN NOTE
· Secondary to history of CVA  · NPO for now  · I did discuss the possibility of PEG tube during the previous admission and this time as well, and family preferred not to have PEG tube placed    If no clinical improvement next been for 48 hours, they would like to consider hospice  · Speech and swallow eval

## 2019-03-10 PROBLEM — N17.9 AKI (ACUTE KIDNEY INJURY) (HCC): Status: ACTIVE | Noted: 2019-03-10

## 2019-03-10 LAB
ALBUMIN SERPL BCP-MCNC: 2.6 G/DL (ref 3.5–5.7)
ALP SERPL-CCNC: 47 U/L (ref 55–165)
ALT SERPL W P-5'-P-CCNC: 9 U/L (ref 7–52)
ANION GAP SERPL CALCULATED.3IONS-SCNC: 7 MMOL/L (ref 4–13)
AST SERPL W P-5'-P-CCNC: 10 U/L (ref 13–39)
BILIRUB SERPL-MCNC: 0.4 MG/DL (ref 0.2–1)
BUN SERPL-MCNC: 30 MG/DL (ref 7–25)
CALCIUM SERPL-MCNC: 8.3 MG/DL (ref 8.6–10.5)
CHLORIDE SERPL-SCNC: 119 MMOL/L (ref 98–107)
CO2 SERPL-SCNC: 18 MMOL/L (ref 21–31)
CREAT SERPL-MCNC: 1.39 MG/DL (ref 0.6–1.2)
ERYTHROCYTE [DISTWIDTH] IN BLOOD BY AUTOMATED COUNT: 16.4 % (ref 11.5–14.5)
GFR SERPL CREATININE-BSD FRML MDRD: 36 ML/MIN/1.73SQ M
GLUCOSE SERPL-MCNC: 108 MG/DL (ref 65–99)
GLUCOSE SERPL-MCNC: 140 MG/DL (ref 65–140)
GLUCOSE SERPL-MCNC: 96 MG/DL (ref 65–140)
HCT VFR BLD AUTO: 27 % (ref 42–47)
HGB BLD-MCNC: 8.9 G/DL (ref 12–16)
MCH RBC QN AUTO: 32.9 PG (ref 26–34)
MCHC RBC AUTO-ENTMCNC: 32.9 G/DL (ref 31–37)
MCV RBC AUTO: 100 FL (ref 81–99)
MRSA NOSE QL CULT: NORMAL
PLATELET # BLD AUTO: 139 THOUSANDS/UL (ref 149–390)
PMV BLD AUTO: 9.4 FL (ref 8.6–11.7)
POTASSIUM SERPL-SCNC: 3.6 MMOL/L (ref 3.5–5.5)
PROCALCITONIN SERPL-MCNC: 22.1 NG/ML
PROT SERPL-MCNC: 5.3 G/DL (ref 6.4–8.9)
RBC # BLD AUTO: 2.7 MILLION/UL (ref 3.9–5.2)
SODIUM SERPL-SCNC: 144 MMOL/L (ref 134–143)
WBC # BLD AUTO: 9.4 THOUSAND/UL (ref 4.8–10.8)

## 2019-03-10 PROCEDURE — 94640 AIRWAY INHALATION TREATMENT: CPT

## 2019-03-10 PROCEDURE — 99232 SBSQ HOSP IP/OBS MODERATE 35: CPT | Performed by: INTERNAL MEDICINE

## 2019-03-10 PROCEDURE — 94760 N-INVAS EAR/PLS OXIMETRY 1: CPT

## 2019-03-10 PROCEDURE — 84145 PROCALCITONIN (PCT): CPT | Performed by: INTERNAL MEDICINE

## 2019-03-10 PROCEDURE — 99232 SBSQ HOSP IP/OBS MODERATE 35: CPT | Performed by: ANESTHESIOLOGY

## 2019-03-10 PROCEDURE — 80053 COMPREHEN METABOLIC PANEL: CPT | Performed by: INTERNAL MEDICINE

## 2019-03-10 PROCEDURE — 82948 REAGENT STRIP/BLOOD GLUCOSE: CPT

## 2019-03-10 PROCEDURE — 85027 COMPLETE CBC AUTOMATED: CPT | Performed by: INTERNAL MEDICINE

## 2019-03-10 RX ORDER — METOPROLOL TARTRATE 5 MG/5ML
5 INJECTION INTRAVENOUS EVERY 6 HOURS
Status: DISCONTINUED | OUTPATIENT
Start: 2019-03-10 | End: 2019-03-13 | Stop reason: HOSPADM

## 2019-03-10 RX ORDER — HYDRALAZINE HYDROCHLORIDE 20 MG/ML
10 INJECTION INTRAMUSCULAR; INTRAVENOUS EVERY 6 HOURS PRN
Status: DISCONTINUED | OUTPATIENT
Start: 2019-03-10 | End: 2019-03-13 | Stop reason: HOSPADM

## 2019-03-10 RX ORDER — AMLODIPINE BESYLATE 5 MG/1
10 TABLET ORAL DAILY
Status: DISCONTINUED | OUTPATIENT
Start: 2019-03-10 | End: 2019-03-10

## 2019-03-10 RX ORDER — DEXTROSE AND SODIUM CHLORIDE 5; .45 G/100ML; G/100ML
100 INJECTION, SOLUTION INTRAVENOUS CONTINUOUS
Status: DISCONTINUED | OUTPATIENT
Start: 2019-03-10 | End: 2019-03-11

## 2019-03-10 RX ORDER — ISOSORBIDE DINITRATE 10 MG/1
30 TABLET ORAL 4 TIMES DAILY
Status: DISCONTINUED | OUTPATIENT
Start: 2019-03-10 | End: 2019-03-10

## 2019-03-10 RX ADMIN — HYDRALAZINE HYDROCHLORIDE 10 MG: 20 INJECTION INTRAMUSCULAR; INTRAVENOUS at 20:14

## 2019-03-10 RX ADMIN — DEXTROSE AND SODIUM CHLORIDE 100 ML/HR: 5; 450 INJECTION, SOLUTION INTRAVENOUS at 21:48

## 2019-03-10 RX ADMIN — INSULIN LISPRO 2 UNITS: 100 INJECTION, SOLUTION INTRAVENOUS; SUBCUTANEOUS at 17:13

## 2019-03-10 RX ADMIN — IPRATROPIUM BROMIDE AND ALBUTEROL SULFATE 3 ML: 2.5; .5 SOLUTION RESPIRATORY (INHALATION) at 07:13

## 2019-03-10 RX ADMIN — CEFEPIME HYDROCHLORIDE 2000 MG: 2 INJECTION, SOLUTION INTRAVENOUS at 11:58

## 2019-03-10 RX ADMIN — SODIUM CHLORIDE, POTASSIUM CHLORIDE, SODIUM LACTATE AND CALCIUM CHLORIDE 100 ML/HR: 600; 310; 30; 20 INJECTION, SOLUTION INTRAVENOUS at 00:00

## 2019-03-10 RX ADMIN — DEXTROSE AND SODIUM CHLORIDE 100 ML/HR: 5; 450 INJECTION, SOLUTION INTRAVENOUS at 09:26

## 2019-03-10 RX ADMIN — METRONIDAZOLE 500 MG: 500 INJECTION, SOLUTION INTRAVENOUS at 20:00

## 2019-03-10 RX ADMIN — IPRATROPIUM BROMIDE AND ALBUTEROL SULFATE 3 ML: 2.5; .5 SOLUTION RESPIRATORY (INHALATION) at 13:35

## 2019-03-10 RX ADMIN — HEPARIN SODIUM 5000 UNITS: 5000 INJECTION, SOLUTION INTRAVENOUS; SUBCUTANEOUS at 15:10

## 2019-03-10 RX ADMIN — HEPARIN SODIUM 5000 UNITS: 5000 INJECTION, SOLUTION INTRAVENOUS; SUBCUTANEOUS at 21:45

## 2019-03-10 RX ADMIN — METOPROLOL TARTRATE 5 MG: 5 INJECTION, SOLUTION INTRAVENOUS at 22:53

## 2019-03-10 RX ADMIN — HEPARIN SODIUM 5000 UNITS: 5000 INJECTION, SOLUTION INTRAVENOUS; SUBCUTANEOUS at 06:26

## 2019-03-10 RX ADMIN — IPRATROPIUM BROMIDE AND ALBUTEROL SULFATE 3 ML: 2.5; .5 SOLUTION RESPIRATORY (INHALATION) at 20:18

## 2019-03-10 RX ADMIN — INSULIN GLARGINE 20 UNITS: 100 INJECTION, SOLUTION SUBCUTANEOUS at 21:45

## 2019-03-10 RX ADMIN — METRONIDAZOLE 500 MG: 500 INJECTION, SOLUTION INTRAVENOUS at 04:45

## 2019-03-10 RX ADMIN — METRONIDAZOLE 500 MG: 500 INJECTION, SOLUTION INTRAVENOUS at 11:58

## 2019-03-10 NOTE — NURSING NOTE
Room air; VS and sats stable  L A IV infusing without incident  Ott patent  Sleeping when undisturbed  Arouses to name and focuses momentarily; but no attempts to speak or follow commands  Minimal flexion movements in extremities to noxious stimuli  Returns to sleep when left undisturbed

## 2019-03-10 NOTE — PROGRESS NOTES
Critical Care Interval Progress Note     Sharmaine Bejarano 66 y o  female MRN: [id number]    Unit/Bed#: ICU 03 Encounter: [id number]    Impression:  Principal Problem:    Severe sepsis ([facility] [address] )  Active Problems:    Type 2 diabetes mellitus with complication, without long-term current use of insulin (HCC)    Pressure ulcer of left buttock, stage 2    Other dysphagia    Acute on chronic respiratory failure with hypoxia and hypercapnia (HCC)    Colitis    RUTH ANN (acute kidney injury) ([facility] )  Resolved Problems:    * No resolved hospital problems  *        Plan:  I independently examined the patient, reviewed all laboratory and imaging studies as well as all input from consultants  We are managing:    Severe sepsis secondary to right upper lobe pneumonia, likely due to aspiration - Blood cultures remain with no growth to date  Vancomycin discontinued by primary  Continue cefepime and metronidazole for 7-day course or until procalcitonin decreases to 20% of peak  Toxic metabolic encephalopathy - Mental status near reported baseline  Opens eyes to voice and occasionally answers questions  Frequent re-orientation  Lights on during day to maintain sleep-wake cycle  Colitis - Outpatient management as per discussion between GI and family  Hx of CVA with resultant dysphagia - Will need formal speech and swallow eval     Hypernatremia - Fluids made hypotonic while patient is NPO  RUTH ANN - Continues to improve with volume resuscitation  Adequate urine output over preceding 24 hours  DM-II - Blood glucose consistently less than 180  Continue lantus at current dose  Cover hyperglycemia with SSI  PPX - SQH, SCDs  No indication for SUP  Access - PIV  Critical care consult will sign off  Please call with any additional questions or concerns  ______________________________________________________________________  Subjective: States "I'm okay" when queried  Denies pain or difficulty breathing       Recent Events / Nursing Concern: No acute events over preceding 24 hours  Vitals:   Vitals:    03/10/19 0500 03/10/19 0600 03/10/19 0700 03/10/19 0715   BP: 148/63 147/66 164/72 164/72   BP Location: Right arm Right arm Right arm Right arm   Pulse: 90 91 85 87   Resp: 20 (!) 24 (!) 30 22   Temp:    (!) 100 6 °F (38 1 °C)   TempSrc:    Tympanic   SpO2: 94% 93% 95% 95%   Weight:       Height:                 Temperature: Temp (24hrs), Av 1 °F (37 8 °C), Min:99 5 °F (37 5 °C), Max:100 9 °F (38 3 °C)  Current: Temperature: (!) 100 6 °F (38 1 °C)    Hemodynamic Monitoring:  N/A       Respiratory:  SpO2 Device: O2 Device: Nasal cannula  O2 Flow Rate (L/min): 1 L/min(decreased to room air)    Physical Exam:  Physical Exam  General: Opens eyes to voice  Intermittently answers questions, does not follow commands  Oriented to self only  CV: Regular rhythm, normal rate  II/VI holosystolic murmur across precordium  Pulm: Breath sounds coarse on right, lungs otherwise clear to auscultation  Abd: Soft, non-distended, non-tender to palpation  Ext: Warm without edema  Allergies:    Allergies   Allergen Reactions    Codeine     Demerol [Meperidine]     Nsaids     Protonix [Pantoprazole]        Medications:   Scheduled Meds:  Current Facility-Administered Medications:  aspirin 81 mg Oral Daily Jameel Alvarenga MD    atorvastatin 10 mg Oral Daily Jameel Alvarenga MD    bisacodyl 10 mg Oral Daily PRN Jameel Alvarenga MD    cefepime 2,000 mg Intravenous Q12H Jameel Alvarenga MD Last Rate: Stopped (03/10/19 0003)   dextrose 5 % and sodium chloride 0 45 % 100 mL/hr Intravenous Continuous Jameel Alvarenga MD    heparin (porcine) 5,000 Units Subcutaneous [facility] Favio Golden MD    insulin glargine 20 Units Subcutaneous HS Jameel Alvarenga MD    insulin lispro 1-6 Units Subcutaneous Q6H [facility] Favio Golden MD    ipratropium-albuterol 3 mL Nebulization Q6H While awake Shan Holguin MD    lactated ringers 100 mL/hr Intravenous Continuous Timbo England MD Last Rate: 100 mL/hr (03/10/19 0515)   metroNIDAZOLE 500 mg Intravenous Govind Pillai MD Last Rate: Stopped (03/10/19 0515)   sertraline 50 mg Oral Daily Nubia Landeros MD    sodium chloride (PF) 3 mL Intravenous PRN Nubia Landeros MD      Continuous Infusions:  dextrose 5 % and sodium chloride 0 45 % 100 mL/hr    lactated ringers 100 mL/hr Last Rate: 100 mL/hr (03/10/19 0515)     PRN Meds:    bisacodyl 10 mg Daily PRN   sodium chloride (PF) 3 mL PRN       Labs:   Results from last 7 days   Lab Units 03/10/19  0448 03/09/19  0425 03/08/19  0500 03/07/19  0814   WBC Thousand/uL 9 40 10 60 19 70* 12 10*   HEMOGLOBIN g/dL 8 9* 9 4* 10 9* 11 7*   HEMATOCRIT % 27 0* 29 5* 32 7* 33 9*   PLATELETS Thousands/uL 139* 149 182 208   NEUTROS PCT %  --   --   --  76*   MONOS PCT %  --   --   --  6     Results from last 7 days   Lab Units 03/10/19  0448 03/09/19  0425 03/08/19  1634 03/08/19  0500 03/07/19  0814   SODIUM mmol/L 144* 142 140 139 135   POTASSIUM mmol/L 3 6 4 2 4 4 4 7 4 4   CHLORIDE mmol/L 119* 116* 113* 111* 104   CO2 mmol/L 18* 18* 18* 17* 24   ANION GAP mmol/L 7 8 9 11 7   BUN mg/dL 30* 37* 41* 39* 27*   CREATININE mg/dL 1 39* 1 68* 1 87* 1 82* 1 14   CALCIUM mg/dL 8 3* 8 1* 8 4* 8 3* 9 4   ALT U/L 9 8  --  13 13   AST U/L 10* 11*  --  21 17   ALK PHOS U/L 47* 48*  --  47* 67   ALBUMIN g/dL 2 6* 2 6*  --  2 8* 3 5   TOTAL BILIRUBIN mg/dL 0 40 0 30  --  0 40 0 40          Results from last 7 days   Lab Units 03/09/19  0425 03/07/19  0814   INR  1 34 1 08   PTT seconds  --  26     Results from last 7 days   Lab Units 03/07/19  1205 03/07/19  0814   TROPONIN I ng/mL 0 05* 0 03     Results from last 7 days   Lab Units 03/08/19  1643 03/07/19  0814   LACTIC ACID mmol/L 2 1* 1 4     ABG:  Lab Results   Component Value Date    PHART 7 280 (L) 03/08/2019    JYA2FOA 37 8 03/08/2019    PO2ART 99 0 03/08/2019    FYC8BLY 17 3 (L) 03/08/2019    BEART -8 3 (L) 03/08/2019    SOURCE Radial, Right 03/08/2019     VBG:  Results from last 7 days   Lab Units 03/08/19  1103   ABG SOURCE  Radial, Right     Results from last 7 days   Lab Units 03/09/19  0425 03/08/19  0500 03/07/19  1206 03/07/19  0814   PROCALCITONIN ng/ml 39 12* 66 81* 1 34* 0 58*     Vancomycin Tr   Date Value Ref Range Status   03/08/2019 20 7 (HH) 5 0 - 12 0 ug/mL Final        Diagnostic Imaging / Data: I have personally reviewed pertinent reports  Code Status: Level 3 - DNAR and DNI    Portions of the record may have been created with voice recognition software  Occasional wrong word or "sound a like" substitutions may have occurred due to the inherent limitations of voice recognition software  Read the chart carefully and recognize, using context, where substitutions have occurred      SIGNATURE: Paulo Alvarado MD  DATE: March 10, 2019  TIME: 9:11 AM

## 2019-03-10 NOTE — PROGRESS NOTES
Progress Note Marky Bejarano 1940, 66 y o  female MRN: [id number]    Unit/Bed#: ICU 03 Encounter: [id number]    Primary Care Provider: Ananda Christensen,    Date and time admitted to hospital: 3/7/2019  8:03 AM        * Severe sepsis ([facility] [address] )  Assessment & Plan  · Severe sepsis secondary to aspiration pneumonia, colitis  · Appreciate CT results - pneumonia and colitis noted  · We will treat with broad-spectrum antibiotics, namely cefepime and Flagyl pending culture results (vancomycin dc'd)  · Leukocytosis resolved  · Monitor intensive care unit  · After an extensive discussion with the patient's daughter, she notes that she would like to pursue aggressive medical treatment and measures until the point of cardiac arrest, with the understanding the patient will remain DNR/DNI  I did discuss hospice with patient's daughter who is the medical POA and she is resistant to this idea  · Appreciate GI input - As patient is improving, family would like to defer any GI intervention until she is discharged  This was discussed with the patient by Dr Umanzor 59 of GI  · Appreciate critical care input    Acute on chronic respiratory failure with hypoxia and hypercapnia ([facility] [address] )  Assessment & Plan  · Secondary to aspiration pneumonia  · Titrated off of BiPAP  · Improving  · DNR/DNI  · Monitor in ICU for now    Other dysphagia  Assessment & Plan  · Secondary to history of CVA  · NPO for now  · I did discuss the possibility of PEG tube during the previous admission and this admission as well, and family preferred not to have PEG tube placed  · Speech and swallow eval - t/c resuming pureed diet with honey thick liquids once more clinically improved      RUTH ANN (acute kidney injury) ([facility] [address] )  Assessment & Plan  · Creatinine improving with IV fluids    Colitis  Assessment & Plan  · Noted on CT  · Appreciate GI input - GI discussed with daughter whether or not she would like a colonoscopy/flex sig, they would like to hold off for now  · Continue antibiotics    Pressure ulcer of left buttock, stage 2  Assessment & Plan  · Present on admission  · Wound care    Type 2 diabetes mellitus with complication, without long-term current use of insulin [facility])  Assessment & Plan  No results found for: HGBA1C    Recent Labs     19  1759 19  2128 19  2331 03/10/19  0623   POCGLU 148* 163* 163* 96       Blood Sugar Average: Last 72 hrs:  (P) 158 125     We will give half dose Lantus while patient is NPO along with sliding scale        VTE Pharmacologic Prophylaxis: Pharmacologic: Heparin    Patient Centered Rounds: I have performed bedside rounds with nursing staff today  Discussions with Specialists or Other Care Team Provider: GI  Education and Discussions with Family / Patient: daughter regi over the phone    Time Spent for Care: 30 minutes  More than 50% of total time spent on counseling and coordination of care as described above  Current Length of Stay: 3 day(s)    Current Patient Status: Inpatient   Certification Statement: The patient will continue to require additional inpatient hospital stay due to sepsis    Discharge Plan: pending hospital course    Code Status: Level 3 - DNAR and DNI    Subjective:   Patient seen examined  No acute events overnight  A bit more awake unresponsive, although her baseline is poor to begin with    Objective:     Vitals:   Temp (24hrs), Av 1 °F (37 8 °C), Min:99 5 °F (37 5 °C), Max:100 9 °F (38 3 °C)    Temp:  [99 5 °F (37 5 °C)-100 9 °F (38 3 °C)] 100 6 °F (38 1 °C)  HR:  [] 87  Resp:  [13-30] 22  BP: (125-166)/(58-95) 164/72  SpO2:  [92 %-100 %] 95 %  Body mass index is 29 54 kg/m²  Input and Output Summary (last 24 hours):        Intake/Output Summary (Last 24 hours) at 3/10/2019 0841  Last data filed at 3/10/2019 0515  Gross per 24 hour   Intake 1913 33 ml   Output 1650 ml   Net 263 33 ml       Physical Exam:     Physical Exam   Constitutional: She appears well-developed  HENT:   Head: Normocephalic and atraumatic  Eyes: Pupils are equal, round, and reactive to light  EOM are normal    Neck: Normal range of motion  Neck supple  Cardiovascular: Normal rate and regular rhythm  Pulmonary/Chest: Effort normal  She has no wheezes  She has rales  Abdominal: Soft  Bowel sounds are normal    Musculoskeletal: She exhibits no edema  Neurological: She is alert  Oriented to person only which is her baseline   Skin: Skin is warm  Capillary refill takes less than 2 seconds  Psychiatric:   Unable to assess   Nursing note and vitals reviewed  Additional Data:     Labs:    Results from last 7 days   Lab Units 03/10/19  0448  03/07/19  0814   WBC Thousand/uL 9 40   < > 12 10*   HEMOGLOBIN g/dL 8 9*   < > 11 7*   HEMATOCRIT % 27 0*   < > 33 9*   PLATELETS Thousands/uL 139*   < > 208   NEUTROS PCT %  --   --  76*   LYMPHS PCT %  --   --  15*   MONOS PCT %  --   --  6   EOS PCT %  --   --  2    < > = values in this interval not displayed  Results from last 7 days   Lab Units 03/10/19  0448   POTASSIUM mmol/L 3 6   CHLORIDE mmol/L 119*   CO2 mmol/L 18*   BUN mg/dL 30*   CREATININE mg/dL 1 39*   CALCIUM mg/dL 8 3*   ALK PHOS U/L 47*   ALT U/L 9   AST U/L 10*     Results from last 7 days   Lab Units 03/09/19  0425   INR  1 34     Results from last 7 days   Lab Units 03/10/19  0623 03/09/19  2331 03/09/19  2128 03/09/19  1759 03/09/19  1144 03/09/19  0749 03/09/19  0027 03/08/19  2158 03/08/19  1558 03/08/19  1205 03/08/19  0757 03/08/19  0543   POC GLUCOSE mg/dl 96 163* 163* 148* 122 121 139 138 191* 188* 187* 216*           * I Have Reviewed All Lab Data Listed Above  * Additional Pertinent Lab Tests Reviewed:  [facility] Admission  Reviewed    Imaging:  Imaging Reports Reviewed Today Include: n/a    Recent Cultures (last 7 days):     Results from last 7 days   Lab Units 03/07/19  1209 03/07/19  0815 03/07/19  0814   BLOOD CULTURE   --  No Growth at 48 hrs  No Growth at 48 hrs  LEGIONELLA URINARY ANTIGEN  Negative  --   --        Last 24 Hours Medication List:     Current Facility-Administered Medications:  aspirin 81 mg Oral Daily Blanche Espino MD    atorvastatin 10 mg Oral Daily Blanche Espino MD    bisacodyl 10 mg Oral Daily PRN Blanche Espino MD    cefepime 2,000 mg Intravenous Q12H Blanche Espino MD Last Rate: Stopped (03/10/19 0003)   dextrose 5 % and sodium chloride 0 45 % 100 mL/hr Intravenous Continuous Blanche Espino MD    heparin (porcine) 5,000 Units Subcutaneous [facility] Francois Nicole MD    insulin glargine 20 Units Subcutaneous HS Blanche Espino MD    insulin lispro 1-6 Units Subcutaneous Q6H [facility] Francois Nicole MD    ipratropium-albuterol 3 mL Nebulization Q6H While awake Claudine Holguin MD    lactated ringers 100 mL/hr Intravenous Continuous Francois Nicole MD Last Rate: 100 mL/hr (03/10/19 0515)   metroNIDAZOLE 500 mg Intravenous Sylvia Flores MD Last Rate: Stopped (03/10/19 0515)   sertraline 50 mg Oral Daily Blanche Espino MD    sodium chloride (PF) 3 mL Intravenous PRN Blanche Espino MD         Today, Patient Was Seen By: Blanche Espino MD    ** Please Note: Dictation voice to text software may have been used in the creation of this document   **

## 2019-03-10 NOTE — ASSESSMENT & PLAN NOTE
· Secondary to history of CVA  · NPO for now  · I did discuss the possibility of PEG tube during the previous admission and this admission as well, and family preferred not to have PEG tube placed  · Speech and swallow eval - t/c resuming pureed diet with honey thick liquids once more clinically improved

## 2019-03-10 NOTE — CONSULTS
The patient's Vancomycin therapy has been discontinued  Thank you for this consult; Pharmacy will sign off now       Herve Dick, Pharmacist

## 2019-03-10 NOTE — CONSULTS
Vancomycin IV Pharmacy-to-Dose Consultation    Yury Parsons is a 66 y o  female who is currently receiving Vancomycin IV with management by the Pharmacy Consult service  Assessment/Plan:  The patient was reviewed  Renal function is stable and no signs or symptoms of nephrotoxicity and/or infusion reactions were documented in the chart  Based on today?s assessment, continue current vancomycin (day # 4) dosing of 1500mg IV q24 Hrs, with a plan for a trough to be drawn at 0930 on 3/11/19  We will continue to follow the patient?s culture results and clinical progress daily      Christa Altamirano, Pharmacist

## 2019-03-10 NOTE — PROGRESS NOTES
Progress Note - Nephrology   Degisela Bejarano 66 y o  female MRN: [id number]  Unit/Bed#: ICU 03 Encounter: [id number]    A/P:  1  Acute kidney injury: partly due to volume depletion as she is not taking po  She had an improvement in renal function with IVF  However, she is septic and may have ATN  Urine electrolyte have been ordered  Continue NS at 125 ml/hr  The family has declined a peg  3/10: kidney function improved  IVF changed to 1/2 NS due to mild hyeprnatremia  2  Sepsis: probably due to aspiration pneumonia  She is receiving antibiotics  3  Dysphagia: comfort care being offered as patient's family do not want a peg   4  Hyperchloremic metabolic acidosis: continue IVF and obtain serial studies  Follow up reason for today's visit: acute kidney injury    Severe sepsis [facility])    Patient Active Problem List   Diagnosis    Bronchospasm    Renal insufficiency    Type 2 diabetes mellitus with complication, without long-term current use of insulin ([address] )    Pressure ulcer of left buttock, stage 2    Other dysphagia    Severe sepsis (HCC)    Acute on chronic respiratory failure with hypoxia and hypercapnia (HCC)    Colitis    RUTH ANN (acute kidney injury) ([address] )         Subjective:   Nonverbal and cannot offer ROS    Objective:     Vitals: Blood pressure 156/72, pulse 92, temperature 99 1 °F (37 3 °C), temperature source Temporal, resp  rate 20, height 5' 6" (1 676 m), weight 83 kg (183 lb), SpO2 92 %, not currently breastfeeding  ,Body mass index is 29 54 kg/m²  Weight (last 2 days)     Date/Time   Weight    19 0500   83 (183)                Intake/Output Summary (Last 24 hours) at 3/10/2019 1421  Last data filed at 3/10/2019 1158  Gross per 24 hour   Intake 2450 ml   Output 1950 ml   Net 500 ml     I/O last 3 completed shifts: In: 3888 3 [I V :3288 3; IV [id number]]  Out: 2625 [Urine:2625]    Urethral Catheter 16 Fr   (Active)   Amt returned on insertion(mL) 200 mL 3/7/2019  8:40 AM Site Assessment Clean 3/7/2019  8:40 AM   Collection Container Standard drainage bag 3/7/2019  8:40 AM   Securement Method Securing device (Describe) 3/9/2019  4:00 AM   Output (mL) 200 mL 3/9/2019  6:00 AM       Physical Exam: /72   Pulse 92   Temp 99 1 °F (37 3 °C) (Temporal)   Resp 20   Ht 5' 6" (1 676 m)   Wt 83 kg (183 lb)   LMP  (LMP Unknown)   SpO2 92%   Breastfeeding? No   BMI 29 54 kg/m²     General Appearance:    Lethargic  Opens eyes to verbal and tactile prodding  Is nonverbal   Head:    Normocephalic, without obvious abnormality, atraumatic   Eyes:    Conjunctiva/corneas clear   Ears:    Normal external ears   Nose:   Nares normal, septum midline, mucosa normal, no drainage    or sinus tenderness   Throat:   Lips, mucosa, and tongue normal; teeth and gums normal   Neck:   Supple, symmetrical, trachea midline, no adenopathy;        thyroid:  No enlargement/tenderness/nodules; no carotid    bruit or JVD   Back:     Symmetric, no curvature, ROM normal, no CVA tenderness   Lungs:     Exp rhonchi R>>L, respirations unlabored   Chest wall:    No tenderness or deformity   Heart:    Regular rate and rhythm, S1 and S2 normal, no murmur, rub   or gallop   Abdomen:     Soft, non-tender, bowel sounds active   Extremities:   Extremities normal, atraumatic, no cyanosis or edema   Skin:   Skin color, texture, turgor normal, no rashes or lesions   Lymph nodes:   Cervical normal   Neurologic:   CNII-XII not assessed            Lab, Imaging and other studies: I have personally reviewed pertinent labs    CBC:   Lab Results   Component Value Date    WBC 9 40 03/10/2019    HGB 8 9 (L) 03/10/2019    HCT 27 0 (L) 03/10/2019     (H) 03/10/2019     (L) 03/10/2019    MCH 32 9 03/10/2019    MCHC 32 9 03/10/2019    RDW 16 4 (H) 03/10/2019    MPV 9 4 03/10/2019     CMP:   Lab Results   Component Value Date    K 3 6 03/10/2019     (H) 03/10/2019    CO2 18 (L) 03/10/2019    BUN 30 (H) 03/10/2019 CREATININE 1 39 (H) 03/10/2019    CALCIUM 8 3 (L) 03/10/2019    AST 10 (L) 03/10/2019    ALT 9 03/10/2019    ALKPHOS 47 (L) 03/10/2019    EGFR 36 03/10/2019         Results from last 7 days   Lab Units 03/10/19  0448 03/09/19  0425 03/08/19  1634 03/08/19  0500   POTASSIUM mmol/L 3 6 4 2 4 4 4 7   CHLORIDE mmol/L 119* 116* 113* 111*   CO2 mmol/L 18* 18* 18* 17*   BUN mg/dL 30* 37* 41* 39*   CREATININE mg/dL 1 39* 1 68* 1 87* 1 82*   CALCIUM mg/dL 8 3* 8 1* 8 4* 8 3*   ALK PHOS U/L 47* 48*  --  47*   ALT U/L 9 8  --  13   AST U/L 10* 11*  --  21         Phosphorus: No results found for: PHOS  Magnesium: No results found for: MG  Urinalysis: No results found for: COLORU, CLARITYU, SPECGRAV, PHUR, LEUKOCYTESUR, NITRITE, PROTEINUA, GLUCOSEU, KETONESU, BILIRUBINUR, BLOODU  Ionized Calcium: No results found for: CAION  Coagulation:   No results found for: PT, INR, APTT  Troponin: No results found for: TROPONINI  ABG:   No results found for: PHART, ZES4UJM, PO2ART, XHJ6RAJ, A1FSHKDY, BEART, SOURCE  Radiology review:     IMAGING  Procedure: Ct Chest Abdomen Pelvis Wo Contrast    Result Date: 3/7/2019  Narrative: CT CHEST, ABDOMEN AND PELVIS WITHOUT IV CONTRAST INDICATION:   Pneumonia complicated / unresolved Shortness of breath  COMPARISON:  August 22, 2017 TECHNIQUE: CT examination of the chest, abdomen and pelvis was performed without intravenous contrast   Axial, sagittal, and coronal 2D reformatted images were created from the source data and submitted for interpretation  Radiation dose length product (DLP) for this visit:  898 7 mGy-cm   This examination, like all CT scans performed in the [facility], was performed utilizing techniques to minimize radiation dose exposure, including the use of iterative reconstruction and automated exposure control  Enteric contrast was not administered  FINDINGS: CHEST LUNGS:  The trachea and central bronchial tree are patent    Consolidation is seen in the right upper lobe  Airspace opacities are seen within the lung bases  PLEURA:  Unremarkable  HEART/GREAT VESSELS:  Atherosclerotic changes are noted in thoracic aorta and coronary arteries  MEDIASTINUM AND EVELYN:  Subcentimeter mediastinal lymph nodes are seen  Limited evaluation the hilar region due to lack of IV contrast CHEST WALL AND LOWER NECK: There is a 2 2 cm left adrenal gland nodule  Incidental discovery of one or more thyroid nodule(s) measuring more than 1 5 cm and without suspicious features is noted in this patient who is above 28years old; according to guidelines published in the February 2015 white paper on incidental thyroid nodules in the Journal of the [employer] of Radiology [facility]), further characterization with thyroid ultrasound is recommended  ABDOMEN LIVER/BILIARY TREE:  Unremarkable  GALLBLADDER:  Gallbladder is not visualized and may be contracted  SPLEEN:  Unremarkable  PANCREAS:  Unremarkable  ADRENAL GLANDS:  Unremarkable  KIDNEYS/URETERS:  No evidence of hydronephrosis 8 mm calcification in the left renal pelvis is noted  STOMACH AND BOWEL:  Liquid stool is seen within the colon  Thickening of the rectal wall with fecal material is noted  Colonic diverticulosis without evidence of acute diverticulitis is seen  APPENDIX:  Not visualized  ABDOMINOPELVIC CAVITY:  Small amount of free fluid in the pelvis  VESSELS:  Atherosclerotic changes are present  No evidence of aneurysm  PELVIS REPRODUCTIVE ORGANS:  Patient is status post hysterectomy  URINARY BLADDER:  Ott catheter is seen within urinary bladder  Mild thickening of the urinary bladder wall is seen  ABDOMINAL WALL/INGUINAL REGIONS:  Multiple injection granulomas are seen in the soft tissues  OSSEOUS STRUCTURES:  Prior extensive spinal surgery is seen  Right ORIF of the femur is noted  Diffuse osteopenia is seen       Impression: Consolidation in the right upper lobe with scattered airspace opacity in lungs suggestive of infection  Follow-up to resolution is recommended  Thickening of the rectal wall with surrounding inflammatory changes  Findings may represent stercoral colitis  Follow-up with GI is recommended  Thickening of the urinary bladder wall which may represent cystitis  Workstation performed: [id number]     Procedure: Xr Chest Portable    Result Date: 3/8/2019  Narrative: CHEST INDICATION:   respiratory failure  Congestive failure  COPD  COMPARISON:  March 7, 2019  EXAM PERFORMED/VIEWS:  XR CHEST PORTABLE FINDINGS: Cardiomediastinal silhouette appears unremarkable  Small right costophrenic angle effusion with overlying density in the medial right lung base which could represent atelectasis or pneumonia  No pneumothorax  No left pleural effusion  Evidence of thoracolumbar spinal fusion  No acute osseous abnormality noted  Impression: Small right costophrenic angle effusion with overlying density in the medial right lung base which could represent atelectasis or pneumonia  The study was marked in [facility] for immediate notification  Workstation performed: [id number]     Procedure: Xr Chest 1 View Portable    Result Date: 3/7/2019  Narrative: CHEST INDICATION:   cp  COMPARISON:  2/14/2019 EXAM PERFORMED/VIEWS:  XR CHEST PORTABLE Images: 1 FINDINGS: Cardiomediastinal silhouette appears unremarkable  Chronic vague left lower lobe opacity with adjacent pleural thickening upper lobe and right lung remain clear  No pneumothorax  Evidence of thoracolumbar spinal fusion  Impression: Chronic vague left lower lobe opacity with adjacent pleural thickening  Workstation performed: [id number]     Procedure: Ct Head Without Contrast    Result Date: 3/7/2019  Narrative: CT BRAIN - WITHOUT CONTRAST INDICATION:   Confusion/delirium, altered LOC, unexplained  COMPARISON:  February 14, 2019 TECHNIQUE:  CT examination of the brain was performed    In addition to axial images, coronal 2D reformatted images were created and submitted for interpretation  Radiation dose length product (DLP) for this visit:  872 mGy-cm   This examination, like all CT scans performed in the [facility], was performed utilizing techniques to minimize radiation dose exposure, including the use of iterative reconstruction and automated exposure control  IMAGE QUALITY:  Diagnostic  FINDINGS: PARENCHYMA:  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  Chronic infarct seen in the right basal ganglionic region involving the predominant and head of the caudate nucleus  Chronic infarct seen in the left basal ganglionic region  Moderate periventricular and white matter hypodensity seen related to chronic small vessel ischemic changes VENTRICLES AND EXTRA-AXIAL SPACES:  Dilation of the ventricle system seen which is out of proportion to that of simple cyst VISUALIZED ORBITS AND PARANASAL SINUSES:  Mucosal thickening seen in the both maxillary sinuses Soft tissue density seen within the left external auditory madhavi Mucosal thickening seen in the sphenoid sinus, ethmoidal air cells and maxillary sinuses, chronic Mild soft tissue density seen within the right external auditory canal l CALVARIUM AND EXTRACRANIAL SOFT TISSUES:  No gross lytic lesion seen     Impression: No acute intracranial hemorrhage seen No new hypodense area seen within the brain parenchyma to suggest territorial infarct Bilateral chronic deep infarcts in the basal ganglionic region, chronic right cerebellar infarct Moderate to periventricular and white matter hypodensity related to chronic small vessel ischemic changes Soft tissue density seen in the left external auditory canal with complete opacification and without any erosive changes  Suggest direct visual inspection and clinical evaluation for definite characterization  This is unchanged, statistically this may be due to cerumen   Mild soft tissue density seen within the right effusion noted  I personally discussed this study with SAURAV Marlo Alpers on 3/7/2019 at 9:58 AM   Workstation performed: [id number]       Current Facility-Administered Medications   Medication Dose Route Frequency    aspirin (ECOTRIN LOW STRENGTH) EC tablet 81 mg  81 mg Oral Daily    atorvastatin (LIPITOR) tablet 10 mg  10 mg Oral Daily    bisacodyl (DULCOLAX) EC tablet 10 mg  10 mg Oral Daily PRN    cefepime (MAXIPIME) IVPB (premix) 2,000 mg  2,000 mg Intravenous Q12H    dextrose 5 % and sodium chloride 0 45 % infusion  100 mL/hr Intravenous Continuous    heparin (porcine) subcutaneous injection 5,000 Units  5,000 Units Subcutaneous Q8H [address]    insulin glargine (LANTUS) subcutaneous injection 20 Units 0 2 mL  20 Units Subcutaneous HS    insulin lispro (HumaLOG) 100 units/mL subcutaneous injection 1-6 Units  1-6 Units Subcutaneous Q6H [address]    ipratropium-albuterol (DUO-NEB) 0 5-2 5 mg/3 mL inhalation solution 3 mL  3 mL Nebulization Q6H While awake    metroNIDAZOLE (FLAGYL) IVPB (premix) 500 mg  500 mg Intravenous Q8H    sertraline (ZOLOFT) tablet 50 mg  50 mg Oral Daily    sodium chloride (PF) 0 9 % injection 3 mL  3 mL Intravenous PRN     Medications Discontinued During This Encounter   Medication Reason    sodium chloride 0 9 % bolus 2,000 mL     vancomycin (VANCOCIN) IVPB (premix) 1,000 mg Dose adjustment    vancomycin (VANCOCIN) 750 mg in sodium chloride 0 9 % 250 mL IV piggyback Dose adjustment    metoprolol tartrate (LOPRESSOR) tablet 25 mg     insulin lispro (HumaLOG) 100 units/mL subcutaneous injection 1-5 Units     insulin lispro (HumaLOG) 100 units/mL subcutaneous injection 1-5 Units     enoxaparin (LOVENOX) subcutaneous injection 30 mg     insulin lispro (HumaLOG) 100 units/mL subcutaneous injection 1-5 Units     ipratropium-albuterol (DUO-NEB) 0 5-2 5 mg/3 mL inhalation solution 3 mL     vancomycin (VANCOCIN) 750 mg in sodium chloride 0 9 % 250 mL IVPB     vancomycin (VANCOCIN) 750 mg in sodium chloride 0 9 % 250 mL IV piggyback Dose adjustment  sodium chloride 0 9 % infusion     vancomycin (VANCOCIN) 1,500 mg in sodium chloride 0 9 % 250 mL IVPB     lactated ringers infusion        Jaguar Alexander MD

## 2019-03-10 NOTE — ASSESSMENT & PLAN NOTE
No results found for: HGBA1C    Recent Labs     03/09/19  1759 03/09/19  2128 03/09/19  2331 03/10/19  0623   POCGLU 148* 163* 163* 96       Blood Sugar Average: Last 72 hrs:  (P) 158 125     We will give half dose Lantus while patient is NPO along with sliding scale

## 2019-03-10 NOTE — NURSING NOTE
1L/min NC maintained; VS and sats stable  IV infusing without incident  Ott patent  Incontinent of large of amount watery stool  Sleeping most times when undisturbed; opens eyes to name, but does not focus  Does not follow commands; flexes extremities slightly to noxious stimuli  No attempts to communicate  Returns to sleep when left undisturbed

## 2019-03-10 NOTE — ASSESSMENT & PLAN NOTE
· Secondary to aspiration pneumonia  · Titrated off of BiPAP  · Improving  · DNR/DNI  · Monitor in ICU for now

## 2019-03-10 NOTE — SPEECH THERAPY NOTE
Speech/Language Pathology Note    Patient Name: Maryanne Armando  HQCHS'O Date: 3/10/2019     Attempted swallow evaluation this afternoon, however pt was unresponsive/unable to become appropriately alert for PO trials  RN to attempt aspiration risk screen with puree/HTL if/when pt becomes more alert  ST will f/u tomorrow

## 2019-03-10 NOTE — ASSESSMENT & PLAN NOTE
· Severe sepsis secondary to aspiration pneumonia, colitis  · Appreciate CT results - pneumonia and colitis noted  · We will treat with broad-spectrum antibiotics, namely cefepime and Flagyl pending culture results (vancomycin dc'd)  · Leukocytosis resolved  · Monitor intensive care unit  · After an extensive discussion with the patient's daughter, she notes that she would like to pursue aggressive medical treatment and measures until the point of cardiac arrest, with the understanding the patient will remain DNR/DNI  I did discuss hospice with patient's daughter who is the medical POA and she is resistant to this idea  · Appreciate GI input - As patient is improving, family would like to defer any GI intervention until she is discharged   This was discussed with the patient by Dr Chantell Walker of GI  · Appreciate critical care input

## 2019-03-10 NOTE — ASSESSMENT & PLAN NOTE
· Noted on CT  · Appreciate GI input - GI discussed with daughter whether or not she would like a colonoscopy/flex sig, they would like to hold off for now  · Continue antibiotics

## 2019-03-11 LAB
ALBUMIN SERPL BCP-MCNC: 2.7 G/DL (ref 3.5–5.7)
ALP SERPL-CCNC: 48 U/L (ref 55–165)
ALT SERPL W P-5'-P-CCNC: 8 U/L (ref 7–52)
ANION GAP SERPL CALCULATED.3IONS-SCNC: 9 MMOL/L (ref 4–13)
AST SERPL W P-5'-P-CCNC: 10 U/L (ref 13–39)
BILIRUB SERPL-MCNC: 0.4 MG/DL (ref 0.2–1)
BUN SERPL-MCNC: 22 MG/DL (ref 7–25)
CALCIUM SERPL-MCNC: 8.2 MG/DL (ref 8.6–10.5)
CHLORIDE SERPL-SCNC: 112 MMOL/L (ref 98–107)
CO2 SERPL-SCNC: 19 MMOL/L (ref 21–31)
CREAT SERPL-MCNC: 1.28 MG/DL (ref 0.6–1.2)
ERYTHROCYTE [DISTWIDTH] IN BLOOD BY AUTOMATED COUNT: 15.9 % (ref 11.5–14.5)
GFR SERPL CREATININE-BSD FRML MDRD: 40 ML/MIN/1.73SQ M
GLUCOSE SERPL-MCNC: 185 MG/DL (ref 65–140)
GLUCOSE SERPL-MCNC: 208 MG/DL (ref 65–99)
GLUCOSE SERPL-MCNC: 212 MG/DL (ref 65–140)
GLUCOSE SERPL-MCNC: 212 MG/DL (ref 65–140)
GLUCOSE SERPL-MCNC: 213 MG/DL (ref 65–140)
GLUCOSE SERPL-MCNC: 221 MG/DL (ref 65–140)
GLUCOSE SERPL-MCNC: 264 MG/DL (ref 65–140)
HCT VFR BLD AUTO: 29.6 % (ref 42–47)
HGB BLD-MCNC: 9.8 G/DL (ref 12–16)
INR PPP: 1.6 (ref 0.9–1.5)
MCH RBC QN AUTO: 33.4 PG (ref 26–34)
MCHC RBC AUTO-ENTMCNC: 33.1 G/DL (ref 31–37)
MCV RBC AUTO: 101 FL (ref 81–99)
PLATELET # BLD AUTO: 156 THOUSANDS/UL (ref 149–390)
PMV BLD AUTO: 9.5 FL (ref 8.6–11.7)
POTASSIUM SERPL-SCNC: 3 MMOL/L (ref 3.5–5.5)
PROT SERPL-MCNC: 5.7 G/DL (ref 6.4–8.9)
PROTHROMBIN TIME: 18.6 SECONDS (ref 10.2–13)
RBC # BLD AUTO: 2.93 MILLION/UL (ref 3.9–5.2)
SODIUM SERPL-SCNC: 140 MMOL/L (ref 134–143)
WBC # BLD AUTO: 7.3 THOUSAND/UL (ref 4.8–10.8)

## 2019-03-11 PROCEDURE — 99232 SBSQ HOSP IP/OBS MODERATE 35: CPT | Performed by: INTERNAL MEDICINE

## 2019-03-11 PROCEDURE — 80053 COMPREHEN METABOLIC PANEL: CPT | Performed by: INTERNAL MEDICINE

## 2019-03-11 PROCEDURE — 85027 COMPLETE CBC AUTOMATED: CPT | Performed by: INTERNAL MEDICINE

## 2019-03-11 PROCEDURE — 82948 REAGENT STRIP/BLOOD GLUCOSE: CPT

## 2019-03-11 PROCEDURE — 94760 N-INVAS EAR/PLS OXIMETRY 1: CPT

## 2019-03-11 PROCEDURE — 85610 PROTHROMBIN TIME: CPT | Performed by: INTERNAL MEDICINE

## 2019-03-11 PROCEDURE — 94640 AIRWAY INHALATION TREATMENT: CPT

## 2019-03-11 PROCEDURE — 94664 DEMO&/EVAL PT USE INHALER: CPT

## 2019-03-11 RX ORDER — POTASSIUM CHLORIDE 14.9 MG/ML
20 INJECTION INTRAVENOUS EVERY 4 HOURS
Status: COMPLETED | OUTPATIENT
Start: 2019-03-11 | End: 2019-03-11

## 2019-03-11 RX ORDER — DEXTROSE, SODIUM CHLORIDE, AND POTASSIUM CHLORIDE 5; .45; .3 G/100ML; G/100ML; G/100ML
75 INJECTION INTRAVENOUS CONTINUOUS
Status: DISCONTINUED | OUTPATIENT
Start: 2019-03-11 | End: 2019-03-11

## 2019-03-11 RX ORDER — DEXTROSE AND SODIUM CHLORIDE 5; .45 G/100ML; G/100ML
100 INJECTION, SOLUTION INTRAVENOUS CONTINUOUS
Status: DISCONTINUED | OUTPATIENT
Start: 2019-03-11 | End: 2019-03-12

## 2019-03-11 RX ADMIN — IPRATROPIUM BROMIDE AND ALBUTEROL SULFATE 3 ML: 2.5; .5 SOLUTION RESPIRATORY (INHALATION) at 20:06

## 2019-03-11 RX ADMIN — METRONIDAZOLE 500 MG: 500 INJECTION, SOLUTION INTRAVENOUS at 04:30

## 2019-03-11 RX ADMIN — POTASSIUM CHLORIDE 20 MEQ: 200 INJECTION, SOLUTION INTRAVENOUS at 08:00

## 2019-03-11 RX ADMIN — IPRATROPIUM BROMIDE AND ALBUTEROL SULFATE 3 ML: 2.5; .5 SOLUTION RESPIRATORY (INHALATION) at 07:36

## 2019-03-11 RX ADMIN — HEPARIN SODIUM 5000 UNITS: 5000 INJECTION, SOLUTION INTRAVENOUS; SUBCUTANEOUS at 06:18

## 2019-03-11 RX ADMIN — CEFEPIME HYDROCHLORIDE 2000 MG: 2 INJECTION, SOLUTION INTRAVENOUS at 12:07

## 2019-03-11 RX ADMIN — INSULIN LISPRO 1 UNITS: 100 INJECTION, SOLUTION INTRAVENOUS; SUBCUTANEOUS at 17:06

## 2019-03-11 RX ADMIN — INSULIN LISPRO 2 UNITS: 100 INJECTION, SOLUTION INTRAVENOUS; SUBCUTANEOUS at 12:04

## 2019-03-11 RX ADMIN — METRONIDAZOLE 500 MG: 500 INJECTION, SOLUTION INTRAVENOUS at 12:07

## 2019-03-11 RX ADMIN — IPRATROPIUM BROMIDE AND ALBUTEROL SULFATE 3 ML: 2.5; .5 SOLUTION RESPIRATORY (INHALATION) at 13:17

## 2019-03-11 RX ADMIN — INSULIN GLARGINE 20 UNITS: 100 INJECTION, SOLUTION SUBCUTANEOUS at 21:27

## 2019-03-11 RX ADMIN — METOPROLOL TARTRATE 5 MG: 5 INJECTION, SOLUTION INTRAVENOUS at 04:39

## 2019-03-11 RX ADMIN — HEPARIN SODIUM 5000 UNITS: 5000 INJECTION, SOLUTION INTRAVENOUS; SUBCUTANEOUS at 21:27

## 2019-03-11 RX ADMIN — POTASSIUM CHLORIDE 20 MEQ: 200 INJECTION, SOLUTION INTRAVENOUS at 12:07

## 2019-03-11 RX ADMIN — INSULIN LISPRO 2 UNITS: 100 INJECTION, SOLUTION INTRAVENOUS; SUBCUTANEOUS at 00:27

## 2019-03-11 RX ADMIN — INSULIN LISPRO 2 UNITS: 100 INJECTION, SOLUTION INTRAVENOUS; SUBCUTANEOUS at 06:18

## 2019-03-11 RX ADMIN — METOPROLOL TARTRATE 5 MG: 5 INJECTION, SOLUTION INTRAVENOUS at 22:22

## 2019-03-11 RX ADMIN — METOPROLOL TARTRATE 5 MG: 5 INJECTION, SOLUTION INTRAVENOUS at 16:53

## 2019-03-11 RX ADMIN — INSULIN LISPRO 3 UNITS: 100 INJECTION, SOLUTION INTRAVENOUS; SUBCUTANEOUS at 23:21

## 2019-03-11 RX ADMIN — HEPARIN SODIUM 5000 UNITS: 5000 INJECTION, SOLUTION INTRAVENOUS; SUBCUTANEOUS at 15:24

## 2019-03-11 RX ADMIN — METOPROLOL TARTRATE 5 MG: 5 INJECTION, SOLUTION INTRAVENOUS at 10:19

## 2019-03-11 RX ADMIN — DEXTROSE AND SODIUM CHLORIDE 100 ML/HR: 5; 450 INJECTION, SOLUTION INTRAVENOUS at 10:18

## 2019-03-11 RX ADMIN — CEFEPIME HYDROCHLORIDE 2000 MG: 2 INJECTION, SOLUTION INTRAVENOUS at 23:21

## 2019-03-11 RX ADMIN — CEFEPIME HYDROCHLORIDE 2000 MG: 2 INJECTION, SOLUTION INTRAVENOUS at 00:27

## 2019-03-11 RX ADMIN — METRONIDAZOLE 500 MG: 500 INJECTION, SOLUTION INTRAVENOUS at 19:50

## 2019-03-11 NOTE — ASSESSMENT & PLAN NOTE
· Severe sepsis secondary to aspiration pneumonia, colitis  · Will continue cefepime/Flagyl  IV Vancomycin recently discontinued as MRSA swab negative  · Leukocytosis resolved and patient remains afebrile  · Strep pneumo and urine Legionella negative  · Blood cultures negative to date  · After an extensive discussion with the patient's daughter, she notes that she would like to pursue aggressive medical treatment and measures until the point of cardiac arrest, with the understanding the patient will remain DNR/DNI  I did discuss hospice with patient's daughter who is the medical POA and she is resistant to this idea  · Appreciate GI input - As patient is improving, family would like to defer any GI intervention until she is discharged   This was discussed with the patient by Dr Mely Givens of GI  · Appreciate critical care input

## 2019-03-11 NOTE — ASSESSMENT & PLAN NOTE
· Appears to have improved as patient is saturating well on room air  · Secondary to aspiration pneumonia  · Titrated off of BiPAP  · Improving  · DNR/DNI

## 2019-03-11 NOTE — PROGRESS NOTES
Progress Note - Nephrology   Atif Bejarano 66 y o  female MRN: [id number]  Unit/Bed#: ICU 03 Encounter: [id number]    A/P:  1  Acute kidney injury: partly due to volume depletion as she is not taking po  She had an improvement in renal function with IVF  However, she is septic and may have ATN  Urine electrolyte have been ordered  Continue NS at 125 ml/hr  The family has declined a peg  3/10: kidney function improved  IVF changed to 1/2 NS due to mild hypernatremia  3/11: continue IVF and add KCl 40meq/liter  2  Sepsis: probably due to aspiration pneumonia  She is receiving antibiotics  3  Dysphagia: comfort care being offered as patient's family do not want a peg   4  Hyperchloremic metabolic acidosis: continue IVF and obtain serial studies  Follow up reason for today's visit: acute kidney injury    Severe sepsis [facility])    Patient Active Problem List   Diagnosis    Bronchospasm    Renal insufficiency    Type 2 diabetes mellitus with complication, without long-term current use of insulin ([address] )    Pressure ulcer of left buttock, stage 2    Other dysphagia    Severe sepsis (HCC)    Acute on chronic respiratory failure with hypoxia and hypercapnia (HCC)    Colitis    RUTH ANN (acute kidney injury) ([address] )         Subjective:   Nonverbal and cannot offer ROS    Objective:     Vitals: Blood pressure (!) 175/75, pulse 91, temperature 99 2 °F (37 3 °C), temperature source Tympanic, resp  rate (!) 37, height 5' 6" (1 676 m), weight 82 2 kg (181 lb 4 8 oz), SpO2 92 %, not currently breastfeeding  ,Body mass index is 29 26 kg/m²  Weight (last 2 days)     Date/Time   Weight    03/10/19 2000   82 2 (181 3)    03/10/19 1900   82 2 (181 3)    03/09/19 0500   83 (183)                Intake/Output Summary (Last 24 hours) at 3/11/2019 1003  Last data filed at 3/11/2019 0600  Gross per 24 hour   Intake 2306 67 ml   Output 2375 ml   Net -68 33 ml     I/O last 3 completed shifts: In: 4606 7 [I V :3956 7;  IV Piggyback:650]  Out: 6675 [Urine:3275]    Urethral Catheter 16 Fr  (Active)   Amt returned on insertion(mL) 200 mL 3/7/2019  8:40 AM   Site Assessment Clean 3/7/2019  8:40 AM   Collection Container Standard drainage bag 3/7/2019  8:40 AM   Securement Method Securing device (Describe) 3/9/2019  4:00 AM   Output (mL) 200 mL 3/9/2019  6:00 AM       Physical Exam: BP (!) 175/75 (BP Location: Right arm)   Pulse 91   Temp 99 2 °F (37 3 °C) (Tympanic)   Resp (!) 37   Ht 5' 6" (1 676 m)   Wt 82 2 kg (181 lb 4 8 oz)   LMP  (LMP Unknown)   SpO2 92%   Breastfeeding? No   BMI 29 26 kg/m²     General Appearance:    Lethargic  Opens eyes to verbal and tactile prodding  Is nonverbal   Head:    Normocephalic, without obvious abnormality, atraumatic   Eyes:    Conjunctiva/corneas clear   Ears:    Normal external ears   Nose:   Nares normal, septum midline, mucosa normal, no drainage    or sinus tenderness   Throat:   Lips, mucosa, and tongue normal; teeth and gums normal   Neck:   Supple, symmetrical, trachea midline, no adenopathy;        thyroid:  No enlargement/tenderness/nodules; no carotid    bruit or JVD   Back:     Symmetric, no curvature, ROM normal, no CVA tenderness   Lungs:     Exp rhonchi R>>L, respirations unlabored   Chest wall:    No tenderness or deformity   Heart:    Regular rate and rhythm, S1 and S2 normal, no murmur, rub   or gallop   Abdomen:     Soft, non-tender, bowel sounds active   Extremities:   Extremities normal, atraumatic, no cyanosis or edema   Skin:   Skin color, texture, turgor normal, no rashes or lesions   Lymph nodes:   Cervical normal   Neurologic:   CNII-XII not assessed            Lab, Imaging and other studies: I have personally reviewed pertinent labs    CBC:   Lab Results   Component Value Date    WBC 7 30 03/11/2019    HGB 9 8 (L) 03/11/2019    HCT 29 6 (L) 03/11/2019     (H) 03/11/2019     03/11/2019    MCH 33 4 03/11/2019    MCHC 33 1 03/11/2019    RDW 15 9 (H) 03/11/2019    MPV 9 5 03/11/2019     CMP:   Lab Results   Component Value Date    K 3 0 (L) 03/11/2019     (H) 03/11/2019    CO2 19 (L) 03/11/2019    BUN 22 03/11/2019    CREATININE 1 28 (H) 03/11/2019    CALCIUM 8 2 (L) 03/11/2019    AST 10 (L) 03/11/2019    ALT 8 03/11/2019    ALKPHOS 48 (L) 03/11/2019    EGFR 40 03/11/2019         Results from last 7 days   Lab Units 03/11/19  0439 03/10/19  0448 03/09/19  0425   POTASSIUM mmol/L 3 0* 3 6 4 2   CHLORIDE mmol/L 112* 119* 116*   CO2 mmol/L 19* 18* 18*   BUN mg/dL 22 30* 37*   CREATININE mg/dL 1 28* 1 39* 1 68*   CALCIUM mg/dL 8 2* 8 3* 8 1*   ALK PHOS U/L 48* 47* 48*   ALT U/L 8 9 8   AST U/L 10* 10* 11*         Phosphorus: No results found for: PHOS  Magnesium: No results found for: MG  Urinalysis: No results found for: COLORU, CLARITYU, SPECGRAV, PHUR, LEUKOCYTESUR, NITRITE, PROTEINUA, GLUCOSEU, KETONESU, BILIRUBINUR, BLOODU  Ionized Calcium: No results found for: CAION  Coagulation:   Lab Results   Component Value Date    INR 1 60 (H) 03/11/2019     Troponin: No results found for: TROPONINI  ABG:   No results found for: PHART, VBI7XRF, PO2ART, BUE3YBP, N5COPJDO, BEART, SOURCE  Radiology review:     IMAGING  Procedure: Ct Chest Abdomen Pelvis Wo Contrast    Result Date: 3/7/2019  Narrative: CT CHEST, ABDOMEN AND PELVIS WITHOUT IV CONTRAST INDICATION:   Pneumonia complicated / unresolved Shortness of breath  COMPARISON:  August 22, 2017 TECHNIQUE: CT examination of the chest, abdomen and pelvis was performed without intravenous contrast   Axial, sagittal, and coronal 2D reformatted images were created from the source data and submitted for interpretation  Radiation dose length product (DLP) for this visit:  898 7 mGy-cm   This examination, like all CT scans performed in the [facility], was performed utilizing techniques to minimize radiation dose exposure, including the use of iterative reconstruction and automated exposure control  Enteric contrast was not administered  FINDINGS: CHEST LUNGS:  The trachea and central bronchial tree are patent  Consolidation is seen in the right upper lobe  Airspace opacities are seen within the lung bases  PLEURA:  Unremarkable  HEART/GREAT VESSELS:  Atherosclerotic changes are noted in thoracic aorta and coronary arteries  MEDIASTINUM AND EVELYN:  Subcentimeter mediastinal lymph nodes are seen  Limited evaluation the hilar region due to lack of IV contrast CHEST WALL AND LOWER NECK: There is a 2 2 cm left adrenal gland nodule  Incidental discovery of one or more thyroid nodule(s) measuring more than 1 5 cm and without suspicious features is noted in this patient who is above 28years old; according to guidelines published in the February 2015 white paper on incidental thyroid nodules in the Journal of the [employer] of Radiology [facility]), further characterization with thyroid ultrasound is recommended  ABDOMEN LIVER/BILIARY TREE:  Unremarkable  GALLBLADDER:  Gallbladder is not visualized and may be contracted  SPLEEN:  Unremarkable  PANCREAS:  Unremarkable  ADRENAL GLANDS:  Unremarkable  KIDNEYS/URETERS:  No evidence of hydronephrosis 8 mm calcification in the left renal pelvis is noted  STOMACH AND BOWEL:  Liquid stool is seen within the colon  Thickening of the rectal wall with fecal material is noted  Colonic diverticulosis without evidence of acute diverticulitis is seen  APPENDIX:  Not visualized  ABDOMINOPELVIC CAVITY:  Small amount of free fluid in the pelvis  VESSELS:  Atherosclerotic changes are present  No evidence of aneurysm  PELVIS REPRODUCTIVE ORGANS:  Patient is status post hysterectomy  URINARY BLADDER:  Ott catheter is seen within urinary bladder  Mild thickening of the urinary bladder wall is seen  ABDOMINAL WALL/INGUINAL REGIONS:  Multiple injection granulomas are seen in the soft tissues  OSSEOUS STRUCTURES:  Prior extensive spinal surgery is seen    Right ORIF of the femur is noted   Diffuse osteopenia is seen  Impression: Consolidation in the right upper lobe with scattered airspace opacity in lungs suggestive of infection  Follow-up to resolution is recommended  Thickening of the rectal wall with surrounding inflammatory changes  Findings may represent stercoral colitis  Follow-up with GI is recommended  Thickening of the urinary bladder wall which may represent cystitis  Workstation performed: [id number]     Procedure: Xr Chest Portable    Result Date: 3/8/2019  Narrative: CHEST INDICATION:   respiratory failure  Congestive failure  COPD  COMPARISON:  March 7, 2019  EXAM PERFORMED/VIEWS:  XR CHEST PORTABLE FINDINGS: Cardiomediastinal silhouette appears unremarkable  Small right costophrenic angle effusion with overlying density in the medial right lung base which could represent atelectasis or pneumonia  No pneumothorax  No left pleural effusion  Evidence of thoracolumbar spinal fusion  No acute osseous abnormality noted  Impression: Small right costophrenic angle effusion with overlying density in the medial right lung base which could represent atelectasis or pneumonia  The study was marked in [facility] for immediate notification  Workstation performed: [id number]     Procedure: Xr Chest 1 View Portable    Result Date: 3/7/2019  Narrative: CHEST INDICATION:   cp  COMPARISON:  2/14/2019 EXAM PERFORMED/VIEWS:  XR CHEST PORTABLE Images: 1 FINDINGS: Cardiomediastinal silhouette appears unremarkable  Chronic vague left lower lobe opacity with adjacent pleural thickening upper lobe and right lung remain clear  No pneumothorax  Evidence of thoracolumbar spinal fusion  Impression: Chronic vague left lower lobe opacity with adjacent pleural thickening  Workstation performed: [id number]     Procedure: Ct Head Without Contrast    Result Date: 3/7/2019  Narrative: CT BRAIN - WITHOUT CONTRAST INDICATION:   Confusion/delirium, altered LOC, unexplained   COMPARISON:  February 14, 2019 TECHNIQUE:  CT examination of the brain was performed  In addition to axial images, coronal 2D reformatted images were created and submitted for interpretation  Radiation dose length product (DLP) for this visit:  872 mGy-cm   This examination, like all CT scans performed in the [facility], was performed utilizing techniques to minimize radiation dose exposure, including the use of iterative reconstruction and automated exposure control  IMAGE QUALITY:  Diagnostic  FINDINGS: PARENCHYMA:  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  Chronic infarct seen in the right basal ganglionic region involving the predominant and head of the caudate nucleus  Chronic infarct seen in the left basal ganglionic region  Moderate periventricular and white matter hypodensity seen related to chronic small vessel ischemic changes VENTRICLES AND EXTRA-AXIAL SPACES:  Dilation of the ventricle system seen which is out of proportion to that of simple cyst VISUALIZED ORBITS AND PARANASAL SINUSES:  Mucosal thickening seen in the both maxillary sinuses Soft tissue density seen within the left external auditory madhavi Mucosal thickening seen in the sphenoid sinus, ethmoidal air cells and maxillary sinuses, chronic Mild soft tissue density seen within the right external auditory canal l CALVARIUM AND EXTRACRANIAL SOFT TISSUES:  No gross lytic lesion seen     Impression: No acute intracranial hemorrhage seen No new hypodense area seen within the brain parenchyma to suggest territorial infarct Bilateral chronic deep infarcts in the basal ganglionic region, chronic right cerebellar infarct Moderate to periventricular and white matter hypodensity related to chronic small vessel ischemic changes Soft tissue density seen in the left external auditory canal with complete opacification and without any erosive changes  Suggest direct visual inspection and clinical evaluation for definite characterization    This is unchanged, statistically this may be due to cerumen   Mild soft tissue density seen within the right effusion noted  I personally discussed this study with Yasemin Mckay on 3/7/2019 at 9:58 AM   Workstation performed: [id number]       Current Facility-Administered Medications   Medication Dose Route Frequency    aspirin (ECOTRIN LOW STRENGTH) EC tablet 81 mg  81 mg Oral Daily    bisacodyl (DULCOLAX) EC tablet 10 mg  10 mg Oral Daily PRN    cefepime (MAXIPIME) IVPB (premix) 2,000 mg  2,000 mg Intravenous Q12H    dextrose 5 % and sodium chloride 0 45 % infusion  100 mL/hr Intravenous Continuous    heparin (porcine) subcutaneous injection 5,000 Units  5,000 Units Subcutaneous Q8H [facility]    hydrALAZINE (APRESOLINE) injection 10 mg  10 mg Intravenous Q6H PRN    insulin glargine (LANTUS) subcutaneous injection 20 Units 0 2 mL  20 Units Subcutaneous HS    insulin lispro (HumaLOG) 100 units/mL subcutaneous injection 1-6 Units  1-6 Units Subcutaneous Q6H [facility]    ipratropium-albuterol (DUO-NEB) 0 5-2 5 mg/3 mL inhalation solution 3 mL  3 mL Nebulization Q6H While awake    metoprolol (LOPRESSOR) injection 5 mg  5 mg Intravenous Q6H    metroNIDAZOLE (FLAGYL) IVPB (premix) 500 mg  500 mg Intravenous Q8H    potassium chloride 20 mEq IVPB (premix)  20 mEq Intravenous Q4H    sodium chloride (PF) 0 9 % injection 3 mL  3 mL Intravenous PRN     Medications Discontinued During This Encounter   Medication Reason    sodium chloride 0 9 % bolus 2,000 mL     vancomycin (VANCOCIN) IVPB (premix) 1,000 mg Dose adjustment    vancomycin (VANCOCIN) 750 mg in sodium chloride 0 9 % 250 mL IV piggyback Dose adjustment    metoprolol tartrate (LOPRESSOR) tablet 25 mg     insulin lispro (HumaLOG) 100 units/mL subcutaneous injection 1-5 Units     insulin lispro (HumaLOG) 100 units/mL subcutaneous injection 1-5 Units     enoxaparin (LOVENOX) subcutaneous injection 30 mg     insulin lispro (HumaLOG) 100 units/mL subcutaneous injection 1-5 Units  ipratropium-albuterol (DUO-NEB) 0 5-2 5 mg/3 mL inhalation solution 3 mL     vancomycin (VANCOCIN) 750 mg in sodium chloride 0 9 % 250 mL IVPB     vancomycin (VANCOCIN) 750 mg in sodium chloride 0 9 % 250 mL IV piggyback Dose adjustment    sodium chloride 0 9 % infusion     vancomycin (VANCOCIN) 1,500 mg in sodium chloride 0 9 % 250 mL IVPB     lactated ringers infusion     atorvastatin (LIPITOR) tablet 10 mg     sertraline (ZOLOFT) tablet 50 mg     amLODIPine (NORVASC) tablet 10 mg     isosorbide dinitrate (ISORDIL) tablet 30 mg     dextrose 5 % and sodium chloride 0 45 % with KCl 40 mEq/L infusion (premix)        Alec Lunsford MD

## 2019-03-11 NOTE — PROGRESS NOTES
Progress Note Nargis Bejarano 1940, 66 y o  female MRN: [id number]    Unit/Bed#: ICU 03 Encounter: [id number]    Primary Care Provider: Kitty Alvarez DO   Date and time admitted to hospital: 3/7/2019  8:03 AM        * Severe sepsis ([facility] )  Assessment & Plan  · Severe sepsis secondary to aspiration pneumonia, colitis  · Will continue cefepime/Flagyl  IV Vancomycin recently discontinued as MRSA swab negative  · Leukocytosis resolved and patient remains afebrile  · Strep pneumo and urine Legionella negative  · Blood cultures negative to date  · After an extensive discussion with the patient's daughter, she notes that she would like to pursue aggressive medical treatment and measures until the point of cardiac arrest, with the understanding the patient will remain DNR/DNI  I did discuss hospice with patient's daughter who is the medical POA and she is resistant to this idea  · Appreciate GI input - As patient is improving, family would like to defer any GI intervention until she is discharged  This was discussed with the patient by Dr Cuco Mena of GI  · Appreciate critical care input    Acute on chronic respiratory failure with hypoxia and hypercapnia ([facility] )  Assessment & Plan  · Appears to have improved as patient is saturating well on room air  · Secondary to aspiration pneumonia  · Titrated off of BiPAP  · Improving  · DNR/DNI    RUTH ANN (acute kidney injury) ([facility] )  Assessment & Plan  · Creatinine improving with IV fluids  · Nephrology input appreciated    Colitis  Assessment & Plan  · Noted on CT  · Appreciate GI input - GI discussed with daughter whether or not she would like a colonoscopy/flex sig, they would like to hold off for now  · Continue antibiotics    Other dysphagia  Assessment & Plan  · Secondary to history of CVA  · NPO for now  · I did discuss the possibility of PEG tube during the previous admission and this admission as well, and family preferred not to have PEG tube placed    · Speech and swallow eval - t/c resuming pureed diet with honey thick liquids once more clinically improved  Pressure ulcer of left buttock, stage 2  Assessment & Plan  · Present on admission  · Wound care    Type 2 diabetes mellitus with complication, without long-term current use of insulin ([facility])  Assessment & Plan  · Continue reduced dose of Lantus  · Insulin sliding scale  · Patient is currently NPO she is not awake enough or response enough for oral intake      VTE Pharmacologic Prophylaxis: Pharmacologic: Heparin    Patient Centered Rounds: I have performed bedside rounds with nursing staff today  Discussions with Specialists or Other Care Team Provider: yes  Education and Discussions with Family / Patient: yes    Time Spent for Care: 45 minutes  More than 50% of total time spent on counseling and coordination of care as described above  Current Length of Stay: 4 day(s)    Current Patient Status: Inpatient   Certification Statement: The patient will continue to require additional inpatient hospital stay due to Sepsis    Discharge Plan:  Pending hospital course    Code Status: Level 3 - DNAR and DNI    Subjective:   No overnight events noted  Patient with liquid stool per nursing report  Afebrile  Patient still with no p o  Intake given her mental status  Unable to get ROS due to patient's mental status    Objective:     Vitals:   Temp (24hrs), Av °F (37 2 °C), Min:98 °F (36 7 °C), Max:99 8 °F (37 7 °C)    Temp:  [98 °F (36 7 °C)-99 8 °F (37 7 °C)] 99 8 °F (37 7 °C)  HR:  [] 76  Resp:  [19-58] 23  BP: (126-190)/(59-83) 146/65  SpO2:  [92 %-95 %] 94 %  Body mass index is 29 26 kg/m²  Input and Output Summary (last 24 hours): Intake/Output Summary (Last 24 hours) at 3/11/2019 0749  Last data filed at 3/11/2019 0600  Gross per 24 hour   Intake 2793 34 ml   Output 2675 ml   Net 118 34 ml       Physical Exam:     Physical Exam   Constitutional: She appears well-developed  No distress     Elderly  female   HENT:   Head: Normocephalic and atraumatic  Eyes: Conjunctivae and EOM are normal    Neck: Neck supple  No tracheal deviation present  No thyromegaly present  Cardiovascular: Normal rate and regular rhythm  Pulmonary/Chest: Effort normal  No respiratory distress  Abdominal: Soft  She exhibits no distension  There is no tenderness  Musculoskeletal: She exhibits no edema  Neurological:   Patient opens eyes to verbal stimuli however does not follow commands or communicate verbally when asked questions   Skin: Skin is warm and dry  There is pallor  Psychiatric: Her behavior is normal        Additional Data:     Labs:    Results from last 7 days   Lab Units 03/11/19  0439  03/07/19  0814   WBC Thousand/uL 7 30   < > 12 10*   HEMOGLOBIN g/dL 9 8*   < > 11 7*   HEMATOCRIT % 29 6*   < > 33 9*   PLATELETS Thousands/uL 156   < > 208   NEUTROS PCT %  --   --  76*   LYMPHS PCT %  --   --  15*   MONOS PCT %  --   --  6   EOS PCT %  --   --  2    < > = values in this interval not displayed  Results from last 7 days   Lab Units 03/11/19  0439   POTASSIUM mmol/L 3 0*   CHLORIDE mmol/L 112*   CO2 mmol/L 19*   BUN mg/dL 22   CREATININE mg/dL 1 28*   CALCIUM mg/dL 8 2*   ALK PHOS U/L 48*   ALT U/L 8   AST U/L 10*     Results from last 7 days   Lab Units 03/11/19  0439   INR  1 60*     Results from last 7 days   Lab Units 03/11/19  0616 03/11/19  0003 03/10/19  1712 03/10/19  1144 03/10/19  0623 03/09/19  2331 03/09/19  2128 03/09/19  1759 03/09/19  1144 03/09/19  0749 03/09/19  0027 03/08/19  2158   POC GLUCOSE mg/dl 212* 221* 212* 140 96 163* 163* 148* 122 121 139 138           * I Have Reviewed All Lab Data Listed Above  * Additional Pertinent Lab Tests Reviewed:  [address] Admission  Reviewed    Imaging:  Imaging Reports Reviewed Today Include:  No new imaging    Recent Cultures (last 7 days):     Results from last 7 days   Lab Units 03/07/19  1209 03/07/19  0815 03/07/19  0814   BLOOD CULTURE   --  No Growth at 72 hrs  No Growth at 72 hrs  LEGIONELLA URINARY ANTIGEN  Negative  --   --        Last 24 Hours Medication List:     Current Facility-Administered Medications:  aspirin 81 mg Oral Daily Sj Newsome MD    bisacodyl 10 mg Oral Daily PRN Sj Newsome MD    cefepime 2,000 mg Intravenous Q12H Sj Newsome MD Last Rate: Stopped (03/11/19 0057)   dextrose 5 % and sodium chloride 0 45 % 100 mL/hr Intravenous Continuous Sj Newsome MD Last Rate: 100 mL/hr (03/11/19 0600)   heparin (porcine) 5,000 Units Subcutaneous Q8H Ambar Eason MD    hydrALAZINE 10 mg Intravenous Q6H PRN Roxy Seen, PA-C    insulin glargine 20 Units Subcutaneous HS Sj Newsome MD    insulin lispro 1-6 Units Subcutaneous Q6H [address] Renee Dunlap MD    ipratropium-albuterol 3 mL Nebulization Q6H While awake Mayo Bliss MD    metoprolol 5 mg Intravenous Q6H Roxy Seen, PA-C    metroNIDAZOLE 500 mg Intravenous Jazmín Peng MD Last Rate: Stopped (03/11/19 0500)   potassium chloride 20 mEq Intravenous Q4H Mayo Bliss MD    sodium chloride (PF) 3 mL Intravenous PRN Sj Newsome MD         Today, Patient Was Seen By: Mayo Bliss MD    ** Please Note: Dictation voice to text software may have been used in the creation of this document   **

## 2019-03-11 NOTE — SOCIAL WORK
Chart reviewed by case management, pt remains in icu with sepsis  , pt is npo , speech need re-evaluate the pt, pt is a bedhold at [facility], cm will continue to follow and assess for any additional d/c needs, d/c plan was discussed at care coordination rounds today

## 2019-03-11 NOTE — NURSING NOTE
Resting with HOB at [address]  Not answering simple questions; ex: are you in pain  No noted distress on face  Eyes wide open; TV on for pt relaxation  Patient appears to be looking at TV without any facial expression changes  Ott with bright yellow urine; noted small amount  IV fluids cont; D51/2NSS/100cc/hr  No food/drink or oral meds to be given; remaining cautious due to not responding verbally and due to questionable aspiration with any PO intake  Allevyn drsg remains to sacrum  Repositioning every 2 hours with pillows to prop  Bedrails currently up times 4; pt safety maintained

## 2019-03-11 NOTE — SPEECH THERAPY NOTE
Eyes are open  No response to to verbal tactile stimulation  Would not open mouth for oral care or po trial  Remains NPO, will re-attempt evaluaion as able  Jo Kerns  [place], [place], CCC/SLP  [id number]  [email or web address]  Available via tiger text

## 2019-03-11 NOTE — NURSING NOTE
Room air; VS and sats stable  IV infusing  Ott patent  Incontinent of liquid stool  Sleeping when undisturbed  Hospitalist on unit; pt status discussed(specifically intermittent SVT)  See new orders

## 2019-03-11 NOTE — ASSESSMENT & PLAN NOTE
· Continue reduced dose of Lantus  · Insulin sliding scale  · Patient is currently NPO she is not awake enough or response enough for oral intake

## 2019-03-11 NOTE — PLAN OF CARE
Problem: Nutrition/Hydration-ADULT  Goal: Nutrient/Hydration intake appropriate for improving, restoring or maintaining nutritional needs  Description  Monitor and assess patient's nutrition/hydration status for malnutrition (ex- brittle hair, bruises, dry skin, pale skin and conjunctiva, muscle wasting, smooth red tongue, and disorientation)  Collaborate with interdisciplinary team and initiate plan and interventions as ordered  Monitor patient's weight and dietary intake as ordered or per policy  Utilize nutrition screening tool and intervene per policy  Determine patient's food preferences and provide high-protein, high-caloric foods as appropriate       INTERVENTIONS:  - Monitor oral intake, urinary output, labs, and treatment plans  - Assess nutrition and hydration status and recommend course of action  - Evaluate amount of meals eaten  - Assist patient with eating if necessary   - Allow adequate time for meals  - Recommend/ encourage appropriate diets, oral nutritional supplements, and vitamin/mineral supplements  - Order, calculate, and assess calorie counts as needed  - Recommend, monitor, and adjust tube feedings and TPN/PPN based on assessed needs  - Assess need for intravenous fluids  - Provide specific nutrition/hydration education as appropriate  - Include patient/family/caregiver in decisions related to nutrition  Outcome: Not Progressing

## 2019-03-12 LAB
ANION GAP SERPL CALCULATED.3IONS-SCNC: 10 MMOL/L (ref 4–13)
ARTERIAL PATENCY WRIST A: YES
BACTERIA BLD CULT: NORMAL
BACTERIA BLD CULT: NORMAL
BACTERIA UR QL AUTO: ABNORMAL /HPF
BASE EXCESS BLDA CALC-SCNC: -8.5 MMOL/L (ref -2–3)
BASOPHILS # BLD AUTO: 0.1 THOUSANDS/ΜL (ref 0–0.1)
BASOPHILS NFR BLD AUTO: 1 % (ref 0–2)
BILIRUB UR QL STRIP: NEGATIVE
BUN SERPL-MCNC: 18 MG/DL (ref 7–25)
CALCIUM SERPL-MCNC: 8.2 MG/DL (ref 8.6–10.5)
CHLORIDE SERPL-SCNC: 114 MMOL/L (ref 98–107)
CHLORIDE UR-SCNC: 143 MMOL/L (ref 10–330)
CLARITY UR: ABNORMAL
CO2 SERPL-SCNC: 16 MMOL/L (ref 21–31)
COLOR UR: YELLOW
CREAT SERPL-MCNC: 1.07 MG/DL (ref 0.6–1.2)
EOSINOPHIL # BLD AUTO: 0.2 THOUSAND/ΜL (ref 0–0.61)
EOSINOPHIL NFR BLD AUTO: 2 % (ref 0–5)
ERYTHROCYTE [DISTWIDTH] IN BLOOD BY AUTOMATED COUNT: 16.1 % (ref 11.5–14.5)
GFR SERPL CREATININE-BSD FRML MDRD: 50 ML/MIN/1.73SQ M
GLUCOSE SERPL-MCNC: 198 MG/DL (ref 65–140)
GLUCOSE SERPL-MCNC: 198 MG/DL (ref 65–99)
GLUCOSE SERPL-MCNC: 207 MG/DL (ref 65–140)
GLUCOSE SERPL-MCNC: 230 MG/DL (ref 65–140)
GLUCOSE SERPL-MCNC: 235 MG/DL (ref 65–140)
GLUCOSE UR STRIP-MCNC: NEGATIVE MG/DL
HCO3 BLDA-SCNC: 15 MMOL/L (ref 22–28)
HCT VFR BLD AUTO: 29.6 % (ref 42–47)
HGB BLD-MCNC: 9.9 G/DL (ref 12–16)
HGB UR QL STRIP.AUTO: ABNORMAL
KETONES UR STRIP-MCNC: ABNORMAL MG/DL
LEUKOCYTE ESTERASE UR QL STRIP: ABNORMAL
LYMPHOCYTES # BLD AUTO: 1.2 THOUSANDS/ΜL (ref 0.6–4.47)
LYMPHOCYTES NFR BLD AUTO: 13 % (ref 21–51)
MCH RBC QN AUTO: 33.7 PG (ref 26–34)
MCHC RBC AUTO-ENTMCNC: 33.5 G/DL (ref 31–37)
MCV RBC AUTO: 101 FL (ref 81–99)
MONOCYTES # BLD AUTO: 0.8 THOUSAND/ΜL (ref 0.17–1.22)
MONOCYTES NFR BLD AUTO: 10 % (ref 2–12)
NEUTROPHILS # BLD AUTO: 6.5 THOUSANDS/ΜL (ref 1.4–6.5)
NEUTS SEG NFR BLD AUTO: 75 % (ref 42–75)
NITRITE UR QL STRIP: POSITIVE
NON VENT ROOM AIR: ABNORMAL %
NON-SQ EPI CELLS URNS QL MICRO: ABNORMAL /HPF
NRBC BLD AUTO-RTO: 0 /100 WBCS
O2 CT BLDA-SCNC: 13.1 ML/DL
OSMOLALITY UR: 418 MMOL/KG
OTHER STN SPEC: ABNORMAL
OXYHGB MFR BLDA: 96.7 % (ref 94–100)
PCO2 BLDA: 25.2 MM HG (ref 35–45)
PH BLDA: 7.39 [PH] (ref 7.35–7.45)
PH UR STRIP.AUTO: 5.5 [PH]
PLATELET # BLD AUTO: 161 THOUSANDS/UL (ref 149–390)
PMV BLD AUTO: 9.2 FL (ref 8.6–11.7)
PO2 BLDA: 83 MM HG (ref 80–100)
POTASSIUM SERPL-SCNC: 3 MMOL/L (ref 3.5–5.5)
POTASSIUM UR-SCNC: 32.5 MMOL/L (ref 1–300)
PROCALCITONIN SERPL-MCNC: 4.68 NG/ML
PROT UR STRIP-MCNC: ABNORMAL MG/DL
RBC # BLD AUTO: 2.95 MILLION/UL (ref 3.9–5.2)
RBC #/AREA URNS AUTO: ABNORMAL /HPF
SODIUM 24H UR-SCNC: 105 MOL/L
SODIUM SERPL-SCNC: 140 MMOL/L (ref 134–143)
SP GR UR STRIP.AUTO: 1.02 (ref 1–1.03)
SPECIMEN SOURCE: ABNORMAL
UROBILINOGEN UR QL STRIP.AUTO: 0.2 E.U./DL
UUN 24H UR-MCNC: 270 MG/DL
WBC # BLD AUTO: 8.7 THOUSAND/UL (ref 4.8–10.8)
WBC #/AREA URNS AUTO: ABNORMAL /HPF

## 2019-03-12 PROCEDURE — 36600 WITHDRAWAL OF ARTERIAL BLOOD: CPT

## 2019-03-12 PROCEDURE — 94664 DEMO&/EVAL PT USE INHALER: CPT

## 2019-03-12 PROCEDURE — 82805 BLOOD GASES W/O2 SATURATION: CPT | Performed by: INTERNAL MEDICINE

## 2019-03-12 PROCEDURE — 82948 REAGENT STRIP/BLOOD GLUCOSE: CPT

## 2019-03-12 PROCEDURE — 84145 PROCALCITONIN (PCT): CPT | Performed by: INTERNAL MEDICINE

## 2019-03-12 PROCEDURE — G8997 SWALLOW GOAL STATUS: HCPCS

## 2019-03-12 PROCEDURE — 94640 AIRWAY INHALATION TREATMENT: CPT

## 2019-03-12 PROCEDURE — 94760 N-INVAS EAR/PLS OXIMETRY 1: CPT

## 2019-03-12 PROCEDURE — G8996 SWALLOW CURRENT STATUS: HCPCS

## 2019-03-12 PROCEDURE — 87086 URINE CULTURE/COLONY COUNT: CPT | Performed by: INTERNAL MEDICINE

## 2019-03-12 PROCEDURE — 83935 ASSAY OF URINE OSMOLALITY: CPT | Performed by: INTERNAL MEDICINE

## 2019-03-12 PROCEDURE — 85025 COMPLETE CBC W/AUTO DIFF WBC: CPT | Performed by: INTERNAL MEDICINE

## 2019-03-12 PROCEDURE — 92610 EVALUATE SWALLOWING FUNCTION: CPT

## 2019-03-12 PROCEDURE — 84300 ASSAY OF URINE SODIUM: CPT | Performed by: INTERNAL MEDICINE

## 2019-03-12 PROCEDURE — 80048 BASIC METABOLIC PNL TOTAL CA: CPT | Performed by: INTERNAL MEDICINE

## 2019-03-12 PROCEDURE — 81001 URINALYSIS AUTO W/SCOPE: CPT | Performed by: INTERNAL MEDICINE

## 2019-03-12 PROCEDURE — 84540 ASSAY OF URINE/UREA-N: CPT | Performed by: INTERNAL MEDICINE

## 2019-03-12 PROCEDURE — 82436 ASSAY OF URINE CHLORIDE: CPT | Performed by: INTERNAL MEDICINE

## 2019-03-12 PROCEDURE — 84133 ASSAY OF URINE POTASSIUM: CPT | Performed by: INTERNAL MEDICINE

## 2019-03-12 PROCEDURE — 99232 SBSQ HOSP IP/OBS MODERATE 35: CPT | Performed by: INTERNAL MEDICINE

## 2019-03-12 RX ORDER — POTASSIUM CHLORIDE 14.9 MG/ML
20 INJECTION INTRAVENOUS EVERY 4 HOURS
Status: COMPLETED | OUTPATIENT
Start: 2019-03-12 | End: 2019-03-12

## 2019-03-12 RX ORDER — SODIUM CHLORIDE, SODIUM LACTATE, POTASSIUM CHLORIDE, CALCIUM CHLORIDE 600; 310; 30; 20 MG/100ML; MG/100ML; MG/100ML; MG/100ML
75 INJECTION, SOLUTION INTRAVENOUS CONTINUOUS
Status: DISCONTINUED | OUTPATIENT
Start: 2019-03-12 | End: 2019-03-13 | Stop reason: HOSPADM

## 2019-03-12 RX ADMIN — METRONIDAZOLE 500 MG: 500 INJECTION, SOLUTION INTRAVENOUS at 19:35

## 2019-03-12 RX ADMIN — CEFEPIME HYDROCHLORIDE 2000 MG: 2 INJECTION, SOLUTION INTRAVENOUS at 11:49

## 2019-03-12 RX ADMIN — IPRATROPIUM BROMIDE AND ALBUTEROL SULFATE 3 ML: 2.5; .5 SOLUTION RESPIRATORY (INHALATION) at 20:30

## 2019-03-12 RX ADMIN — POTASSIUM CHLORIDE 20 MEQ: 200 INJECTION, SOLUTION INTRAVENOUS at 09:38

## 2019-03-12 RX ADMIN — HEPARIN SODIUM 5000 UNITS: 5000 INJECTION, SOLUTION INTRAVENOUS; SUBCUTANEOUS at 21:45

## 2019-03-12 RX ADMIN — INSULIN LISPRO 2 UNITS: 100 INJECTION, SOLUTION INTRAVENOUS; SUBCUTANEOUS at 05:29

## 2019-03-12 RX ADMIN — HYDRALAZINE HYDROCHLORIDE 10 MG: 20 INJECTION INTRAMUSCULAR; INTRAVENOUS at 07:50

## 2019-03-12 RX ADMIN — INSULIN GLARGINE 20 UNITS: 100 INJECTION, SOLUTION SUBCUTANEOUS at 21:53

## 2019-03-12 RX ADMIN — POTASSIUM CHLORIDE 20 MEQ: 200 INJECTION, SOLUTION INTRAVENOUS at 11:49

## 2019-03-12 RX ADMIN — HEPARIN SODIUM 5000 UNITS: 5000 INJECTION, SOLUTION INTRAVENOUS; SUBCUTANEOUS at 14:01

## 2019-03-12 RX ADMIN — INSULIN LISPRO 3 UNITS: 100 INJECTION, SOLUTION INTRAVENOUS; SUBCUTANEOUS at 18:00

## 2019-03-12 RX ADMIN — CEFEPIME HYDROCHLORIDE 2000 MG: 2 INJECTION, SOLUTION INTRAVENOUS at 23:09

## 2019-03-12 RX ADMIN — IPRATROPIUM BROMIDE AND ALBUTEROL SULFATE 3 ML: 2.5; .5 SOLUTION RESPIRATORY (INHALATION) at 13:34

## 2019-03-12 RX ADMIN — SODIUM CHLORIDE, POTASSIUM CHLORIDE, SODIUM LACTATE AND CALCIUM CHLORIDE 75 ML/HR: 600; 310; 30; 20 INJECTION, SOLUTION INTRAVENOUS at 13:38

## 2019-03-12 RX ADMIN — INSULIN LISPRO 2 UNITS: 100 INJECTION, SOLUTION INTRAVENOUS; SUBCUTANEOUS at 11:50

## 2019-03-12 RX ADMIN — METOPROLOL TARTRATE 5 MG: 5 INJECTION, SOLUTION INTRAVENOUS at 21:46

## 2019-03-12 RX ADMIN — IPRATROPIUM BROMIDE AND ALBUTEROL SULFATE 3 ML: 2.5; .5 SOLUTION RESPIRATORY (INHALATION) at 07:41

## 2019-03-12 RX ADMIN — METOPROLOL TARTRATE 5 MG: 5 INJECTION, SOLUTION INTRAVENOUS at 11:50

## 2019-03-12 RX ADMIN — METOPROLOL TARTRATE 5 MG: 5 INJECTION, SOLUTION INTRAVENOUS at 05:19

## 2019-03-12 RX ADMIN — HEPARIN SODIUM 5000 UNITS: 5000 INJECTION, SOLUTION INTRAVENOUS; SUBCUTANEOUS at 05:19

## 2019-03-12 RX ADMIN — METRONIDAZOLE 500 MG: 500 INJECTION, SOLUTION INTRAVENOUS at 05:19

## 2019-03-12 RX ADMIN — METRONIDAZOLE 500 MG: 500 INJECTION, SOLUTION INTRAVENOUS at 11:49

## 2019-03-12 NOTE — DISCHARGE INSTR - DIET
Level 1 puree diet as tolerated  Honey thick liquids by tsp   Aspiration precautions  Feed only when awake/alert

## 2019-03-12 NOTE — SPEECH THERAPY NOTE
Speech-Language Pathology Bedside Swallow Evaluation      Patient Name: Swapna Oglesby    Today's Date: 3/12/2019     Problem List  Patient Active Problem List   Diagnosis    Bronchospasm    Renal insufficiency    Type 2 diabetes mellitus with complication, without long-term current use of insulin ([facility] )    Pressure ulcer of left buttock, stage 2    Other dysphagia    Severe sepsis (HCC)    Acute on chronic respiratory failure with hypoxia and hypercapnia (HCC)    Colitis    RUTH ANN (acute kidney injury) ([facility] [address] )       Past Medical History  Past Medical History:   Diagnosis Date    Anemia     CHF (congestive heart failure) ([facility] [facility] )     COPD (chronic obstructive pulmonary disease) ([facility] [address] )     home 02 2l    Diabetes mellitus ([facility] )     DJD (degenerative joint disease)     Gait abnormality     GERD (gastroesophageal reflux disease)     Hyperlipidemia     Hypertension     Stroke [facility])        Past Surgical History  Past Surgical History:   Procedure Laterality Date    ABDOMINAL SURGERY         Summary   Pt presented with s/s suggestive of mild-moderate oral and suspected moderate pharyngeal dysphagia  Symptoms or concerns included delayed oral intiation and suspected decreased control of cup swallows HTL  mild-moderate pharyngeal swallow delay and suspected decreased hyolaryngeal elevation upon palpation  Risk for Aspiration: if fed to quickly or should medical status worsened  Recommendations: puree/level 1 diet and honey thick liquids by tsp  Recommended Form of Meds: crushed with puree     Aspiration precautions and compensatory swallowing strategies: upright posture, only feed when fully alert, slow rate of feeding and liquids by teaspoon only    Current Medical Status per admitting H&P:  Pt is a 66 y o  female who presented to [address] [facility] with altered mental status    Patient has a history of CVA, and associated dysphagia as a result, with recurrent aspiration pneumonias who is presenting from her skilled nursing facility with altered mental status and tachypnea  Given the patient's mental status change and aphasia, history was obtained from emergency room physician, medical chart, and the patient's family  Patient was recently hospitalized for aspiration pneumonia, UTI, E coli bacteremia and treated with a 7 day course of cefepime at that time  She was discharged back to her skilled nursing facility and was doing well until yesterday evening and this morning when she was noted to be altered  Upon my examination, patient is resting comfortably on BiPAP  In reviewing the patient's chart, it appears that patient's respiratory distress and sepsis is likely related to a recurrent aspiration  This unfortunately was addressed during the previous admission, and the patient's daughter was given the choice of a PEG tube for nutrition vs hospice  They preferred to defer making a decision at that time  Swallow consult placed as pt is septic, hx  Of dysphagia, CVA and aspiration PNA      Past medical history:  Please see H&P for details      Special Studies:  Xray chest 3/8/19:Small right costophrenic angle effusion with overlying density in the medial right lung base which could represent atelectasis or pneumonia    Xray chest port 3/7/19:Chronic vague left lower lobe opacity with adjacent pleural thickening    CT abd /pelvia 3/7/19:Consolidation in the right upper lobe with scattered airspace opacity in lungs suggestive of infection         Social/Education/Vocational Hx:  Pt lives in SNF/ECF      Swallow Information   Current Risks for Dysphagia & Aspiration: CVA and AMS     Current Symptoms/Concerns: sever oral stage dysphagia/septic    Current Diet: NPO      Baseline Diet: puree/level 1 diet and honey thick liquids      Baseline Assessment   Behavior/Cognition: alert    Speech/Language Status: not able to to follow commands and no verbal output noted    Patient Positioning: upright in bed    Pain Status/Interventions/Response to Interventions:   No nonverbal indications of pain with bed elevation  Swallow Mechanism Exam     Facial: masked facies  Labial: unable to test 2/2 limited command following  Lingual: unable to test 2/2 limited command following, involuntarily able to lick side of lip w/ tongue  Velum: unable to visualize  Mandible: unable to test 2/2 limited command following  Dentition: edentulous  Vocal quality:said "no" clearly   Volitional Cough: unable to initiate volitional cough   Tracheostomy: n/a      Consistencies Assessed and Performance   Consistencies Administered: honey thick and puree  Specific materials administered included chocolate pudding and HTL cranberry juice    Oral Stage:  Mild-moderate  Delayed transfer  Bolus formation and transfer were functional with no significant oral residue noted  Suspect posterior spillage with cup swallows of HTL  Mild right sided anterior leakage at times d/t positioning  Pharyngeal Stage: moderate    Swallow Mechanics:  Swallowing initiation appeared  variably delayed from 2-8 seconds  Laryngeal rise was palpated and judged to be  Reduced but  Appears functional for consistencies tested  No coughing, throat clearing, change in vocal quality or respiratory status noted today post trials Pt took 2 ounces of pudding and 2 ounces of juice        Esophageal Concerns: hx of GERD    Strategies and Efficacy: tsp of liquids, 90 degrees upright, slow feed, watch and feel for swallow    Summary and Recommendations (see above)    Results Reviewed with: RN and MD     Consider referral to: hospice versus peg    Treatment Recommended: brief to assess tolerance     Frequency of treatment: 1-2 visits    Patient Stated Goal: unable to state    Dysphagia Goals per SLP: pt will tolerate level 1 puree diet with honey thick liquids by tsp  without s/s of aspiration x24-48 hours fed by staff    Pt/Family Education: initiated    Pt and caregivers would benefit from/require continued education  Speech Therapy Prognosis   Prognosis: poor    Prognosis Considerations: cognitive status and medically fragile status    Anastasiya Smith, CCC/SLP  [id number]  [email or web address]  Available via [employer] text

## 2019-03-12 NOTE — PROGRESS NOTES
Progress Note Stacey Bejarano 1940, 66 y o  female MRN: [id number]    Unit/Bed#: ICU 03 Encounter: [id number]    Primary Care Provider: Ashley Noel DO   Date and time admitted to hospital: 3/7/2019  8:03 AM        * Severe sepsis ([facility] [address] )  Assessment & Plan  · Severe sepsis secondary to aspiration pneumonia, colitis  · Will continue cefepime/Flagyl  IV Vancomycin recently discontinued as MRSA swab negative  · Leukocytosis resolved and patient remains afebrile  · Strep pneumo and urine Legionella negative  · Blood cultures negative to date  · Will trend procalcitonin  · After an extensive discussion with the patient's daughter, she notes that she would like to pursue aggressive medical treatment and measures until the point of cardiac arrest, with the understanding the patient will remain DNR/DNI  I did discuss hospice with patient's daughter who is the medical POA and she is resistant to this idea  · Appreciate GI input - As patient is improving, family would like to defer any GI intervention until she is discharged  This was discussed with the patient by Dr Mary Lemus of GI  · Appreciate critical care input    Acute on chronic respiratory failure with hypoxia and hypercapnia ([facility] [address] )  Assessment & Plan  · Appears to have improved as patient is saturating well on room air  · Secondary to aspiration pneumonia  · Titrated off of BiPAP  · Improving  · DNR/DNI    RUTH ANN (acute kidney injury) ([facility] [address] )  Assessment & Plan  · Creatinine improving with IV fluids  · Nephrology input appreciated    Other dysphagia  Assessment & Plan  · Secondary to history of CVA  · NPO for now  · Dr Jonathan Greco discussed the possibility of PEG tube during the previous admission and this admission as well, and family preferred not to have PEG tube placed  · Speech and swallow eval - t/c resuming pureed diet with honey thick liquids once more clinically improved      Pressure ulcer of left buttock, stage 2  Assessment & Plan  · Present on admission  · Wound care    Type 2 diabetes mellitus with complication, without long-term current use of insulin ([facility])  Assessment & Plan  · Continue reduced dose of Lantus  · Insulin sliding scale  · Patient is currently NPO she is not awake enough or responsive enough for oral intake      VTE Pharmacologic Prophylaxis: Pharmacologic: Heparin    Patient Centered Rounds: I have performed bedside rounds with nursing staff today  Discussions with Specialists or Other Care Team Provider: yes  Education and Discussions with Family / Patient: yes    Time Spent for Care: 45 minutes  More than 50% of total time spent on counseling and coordination of care as described above  Current Length of Stay: 5 day(s)    Current Patient Status: Inpatient   Certification Statement: The patient will continue to require additional inpatient hospital stay due to Sepsis    Discharge Plan:  Pending hospital course    Code Status: Level 3 - DNAR and DNI    Subjective:   No overnight events noted  Patient opens eyes to verbal stimuli however does not respond  Patient has not been taking p o  Intake due to patient's mental status  T-max of a 100 2° over the last 24 hours  Unable to get a complete review of systems due to patient's mental status    Objective:     Vitals:   Temp (24hrs), Av °F (37 2 °C), Min:98 °F (36 7 °C), Max:100 2 °F (37 9 °C)    Temp:  [98 °F (36 7 °C)-100 2 °F (37 9 °C)] 98 5 °F (36 9 °C)  HR:  [] 78  Resp:  [11-46] 17  BP: (145-194)/(66-85) 173/75  SpO2:  [92 %-96 %] 96 %  Body mass index is 29 39 kg/m²  Input and Output Summary (last 24 hours): Intake/Output Summary (Last 24 hours) at 3/12/2019 0840  Last data filed at 3/12/2019 0601  Gross per 24 hour   Intake 1155 ml   Output 1325 ml   Net -170 ml       Physical Exam:     Physical Exam   Constitutional: No distress  Frail elderly female   HENT:   Head: Normocephalic and atraumatic  Eyes: EOM are normal    Neck: Neck supple   No tracheal deviation present  No thyromegaly present  Cardiovascular: Normal rate and regular rhythm  Pulmonary/Chest: Effort normal  No respiratory distress  Abdominal: Soft  She exhibits no distension  There is no tenderness  Musculoskeletal: She exhibits no edema  Neurological:   Opens eyes to verbal stimuli  Does not follow commands  Skin: Skin is warm and dry  There is pallor  Psychiatric:   Flat affect with depressed mood       Additional Data:     Labs:    Results from last 7 days   Lab Units 03/12/19  0445   WBC Thousand/uL 8 70   HEMOGLOBIN g/dL 9 9*   HEMATOCRIT % 29 6*   PLATELETS Thousands/uL 161   NEUTROS PCT % 75   LYMPHS PCT % 13*   MONOS PCT % 10   EOS PCT % 2     Results from last 7 days   Lab Units 03/12/19  0445 03/11/19  0439   POTASSIUM mmol/L 3 0* 3 0*   CHLORIDE mmol/L 114* 112*   CO2 mmol/L 16* 19*   BUN mg/dL 18 22   CREATININE mg/dL 1 07 1 28*   CALCIUM mg/dL 8 2* 8 2*   ALK PHOS U/L  --  48*   ALT U/L  --  8   AST U/L  --  10*     Results from last 7 days   Lab Units 03/11/19  0439   INR  1 60*     Results from last 7 days   Lab Units 03/12/19  0527 03/11/19  2317 03/11/19  1650 03/11/19  1203 03/11/19  0616 03/11/19  0003 03/10/19  1712 03/10/19  1144 03/10/19  0623 03/09/19  2331 03/09/19  2128 03/09/19  1759   POC GLUCOSE mg/dl 198* 264* 185* 213* 212* 221* 212* 140 96 163* 163* 148*           * I Have Reviewed All Lab Data Listed Above  * Additional Pertinent Lab Tests Reviewed: ingFormerly named [facility] Admission  Reviewed    Imaging:  Imaging Reports Reviewed Today Include:  No new imaging    Recent Cultures (last 7 days):     Results from last 7 days   Lab Units 03/07/19  1209 03/07/19  0815 03/07/19  6675   BLOOD CULTURE   --  No Growth After 4 Days  No Growth After 4 Days     LEGIONELLA URINARY ANTIGEN  Negative  --   --        Last 24 Hours Medication List:     Current Facility-Administered Medications:  aspirin 81 mg Oral Daily João Braun MD    bisacodyl 10 mg Oral Daily PRN Charissa Talamantes MD    cefepime 2,000 mg Intravenous Q12H Charissa Talamantes MD Last Rate: Stopped (03/11/19 3681)   dextrose 5 % and sodium chloride 0 45 % 100 mL/hr Intravenous Continuous Meghan Regalado MD Last Rate: 100 mL/hr (03/11/19 2020)   heparin (porcine) 5,000 Units Subcutaneous Q8H Maricel Bundy MD    hydrALAZINE 10 mg Intravenous Q6H PRN Nichol Muir PA-C    insulin glargine 20 Units Subcutaneous HS Charissa Talamantes MD    insulin lispro 1-6 Units Subcutaneous Q6H [facility] Manisha Roberto MD    ipratropium-albuterol 3 mL Nebulization Q6H While awake Elijah Quinn MD    metoprolol 5 mg Intravenous Q6H Nichol Muir PA-C    metroNIDAZOLE 500 mg Intravenous Yanira Britton MD Last Rate: Stopped (03/12/19 0549)   potassium chloride 20 mEq Intravenous Q4H Elijah Quinn MD    sodium chloride (PF) 3 mL Intravenous PRN Charissa Talamantes MD         Today, Patient Was Seen By: Elijah Quinn MD    ** Please Note: Dictation voice to text software may have been used in the creation of this document   **

## 2019-03-12 NOTE — NURSING NOTE
Handoff report given via phone to [facility] on Medical floor  Vital signs stable; afebrile; wound care completed  Patient will be transferred to medical status  Patient's son made aware of same

## 2019-03-12 NOTE — ASSESSMENT & PLAN NOTE
· Secondary to history of CVA  · NPO for now  · Dr Alverto Chand discussed the possibility of PEG tube during the previous admission and this admission as well, and family preferred not to have PEG tube placed  · Speech and swallow eval - t/c resuming pureed diet with honey thick liquids once more clinically improved

## 2019-03-12 NOTE — SOCIAL WORK
Pt discussed during care coordination rounds  Pt not yet medically stable for discharge  Discharge plan remanis for pt to return to [facility]  CM to follow

## 2019-03-12 NOTE — PROGRESS NOTES
Progress Note - Nephrology   Pearl Bejarano 66 y o  female MRN: [id number]  Unit/Bed#: ICU 03 Encounter: [id number]    A/P:  1  Acute kidney injury: partly due to volume depletion as she is not taking po  She had an improvement in renal function with IVF  However, she is septic and may have ATN  Urine electrolyte have been ordered  Continue NS at 125 ml/hr  The family has declined a peg  3/10: kidney function improved  IVF changed to 1/2 NS due to mild hyeprnatremia  2  Sepsis: probably due to aspiration pneumonia  She is receiving antibiotics  3  Dysphagia: comfort care being offered as patient's family do not want a peg   4  Hyperchloremic metabolic acidosis: continue IVF and obtain serial studies  3/12: she has a nonanion gap metabolic acidosis  Causes include diarrhea which she is having, saline fluid restoration and other more esoteric causes in a patient with a previously normal acid base pattern  Doubt distal renal tubular acidosis but will check a urine pH which should be > 5 5  Hyperaldosteronism can cause hypokalemia  Will  Check urine electrolytes  Will check a UA to make sure she does not have starvation ketosis  She needs a peg tube or NG feeds to improve her hydration status and give appropriate water  Will check a blood gas and if acidotic can give IV bicarbonate  She is npo and cannot give oral bicarbonate tabs    Would change to lactated ringers solution for conversion to bicarbonate and to avoid saline        Follow up reason for today's visit: acute kidney injury    Severe sepsis [facility])    Patient Active Problem List   Diagnosis    Bronchospasm    Renal insufficiency    Type 2 diabetes mellitus with complication, without long-term current use of insulin ([facility] [address] )    Pressure ulcer of left buttock, stage 2    Other dysphagia    Severe sepsis (HCC)    Acute on chronic respiratory failure with hypoxia and hypercapnia (HCC)    Colitis    RUTH ANN (acute kidney injury) ([address] ) Subjective:   Nonverbal and cannot offer ROS    Objective:     Vitals: Blood pressure (!) 173/75, pulse 88, temperature 99 6 °F (37 6 °C), temperature source Tympanic, resp  rate 21, height 5' 6" (1 676 m), weight 82 6 kg (182 lb 1 6 oz), SpO2 95 %, not currently breastfeeding  ,Body mass index is 29 39 kg/m²  Weight (last 2 days)     Date/Time   Weight    03/12/19 0600   82 6 (182 1)    03/10/19 2000   82 2 (181 3)    03/10/19 1900   82 2 (181 3)                Intake/Output Summary (Last 24 hours) at 3/12/2019 1224  Last data filed at 3/12/2019 0601  Gross per 24 hour   Intake 1155 ml   Output 1325 ml   Net -170 ml     I/O last 3 completed shifts: In: 2655 [I V :2405; IV Piggyback:250]  Out: 8796 [Urine:2675]    Urethral Catheter 16 Fr  (Active)   Amt returned on insertion(mL) 200 mL 3/7/2019  8:40 AM   Site Assessment Clean 3/7/2019  8:40 AM   Collection Container Standard drainage bag 3/7/2019  8:40 AM   Securement Method Securing device (Describe) 3/9/2019  4:00 AM   Output (mL) 200 mL 3/9/2019  6:00 AM       Physical Exam: BP (!) 173/75 (BP Location: Right arm)   Pulse 88   Temp 99 6 °F (37 6 °C) (Tympanic)   Resp 21   Ht 5' 6" (1 676 m)   Wt 82 6 kg (182 lb 1 6 oz)   LMP  (LMP Unknown)   SpO2 95%   Breastfeeding? No   BMI 29 39 kg/m²     General Appearance:    Lethargic  Opens eyes to verbal and tactile prodding    Is nonverbal   Head:    Normocephalic, without obvious abnormality, atraumatic   Eyes:    Conjunctiva/corneas clear   Ears:    Normal external ears   Nose:   Nares normal, septum midline, mucosa normal, no drainage    or sinus tenderness   Throat:   Lips, mucosa, and tongue normal; teeth and gums normal   Neck:   Supple, symmetrical, trachea midline, no adenopathy;        thyroid:  No enlargement/tenderness/nodules; no carotid    bruit or JVD   Back:     Symmetric, no curvature, ROM normal, no CVA tenderness   Lungs:     Exp rhonchi R>>L, respirations unlabored   Chest wall: No tenderness or deformity   Heart:    Regular rate and rhythm, S1 and S2 normal, no murmur, rub   or gallop   Abdomen:     Soft, non-tender, bowel sounds active   Extremities:   Extremities normal, atraumatic, no cyanosis or edema   Skin:   Skin color, texture, turgor normal, no rashes or lesions   Lymph nodes:   Cervical normal   Neurologic:   CNII-XII not assessed            Lab, Imaging and other studies: I have personally reviewed pertinent labs  CBC:   Lab Results   Component Value Date    WBC 8 70 03/12/2019    HGB 9 9 (L) 03/12/2019    HCT 29 6 (L) 03/12/2019     (H) 03/12/2019     03/12/2019    MCH 33 7 03/12/2019    MCHC 33 5 03/12/2019    RDW 16 1 (H) 03/12/2019    MPV 9 2 03/12/2019    NRBC 0 03/12/2019     CMP:   Lab Results   Component Value Date    K 3 0 (L) 03/12/2019     (H) 03/12/2019    CO2 16 (L) 03/12/2019    BUN 18 03/12/2019    CREATININE 1 07 03/12/2019    CALCIUM 8 2 (L) 03/12/2019    EGFR 50 03/12/2019           Results from last 7 days   Lab Units 03/12/19  0445 03/11/19  0439 03/10/19  0448 03/09/19  0425   POTASSIUM mmol/L 3 0* 3 0* 3 6 4 2   CHLORIDE mmol/L 114* 112* 119* 116*   CO2 mmol/L 16* 19* 18* 18*   BUN mg/dL 18 22 30* 37*   CREATININE mg/dL 1 07 1 28* 1 39* 1 68*   CALCIUM mg/dL 8 2* 8 2* 8 3* 8 1*   ALK PHOS U/L  --  48* 47* 48*   ALT U/L  --  8 9 8   AST U/L  --  10* 10* 11*         Phosphorus: No results found for: PHOS  Magnesium: No results found for: MG  Urinalysis: No results found for: COLORU, CLARITYU, SPECGRAV, PHUR, LEUKOCYTESUR, NITRITE, PROTEINUA, GLUCOSEU, KETONESU, BILIRUBINUR, BLOODU  Ionized Calcium: No results found for: CAION  Coagulation:   No results found for: PT, INR, APTT  Troponin: No results found for: TROPONINI  ABG:   No results found for: PHART, JXP5WAY, PO2ART, XOY4TSS, Q9IWSVVK, BEART, SOURCE  Radiology review:     IMAGING  Procedure: Ct Chest Abdomen Pelvis Wo Contrast    Result Date: 3/7/2019  Narrative: CT CHEST, ABDOMEN AND PELVIS WITHOUT IV CONTRAST INDICATION:   Pneumonia complicated / unresolved Shortness of breath  COMPARISON:  August 22, 2017 TECHNIQUE: CT examination of the chest, abdomen and pelvis was performed without intravenous contrast   Axial, sagittal, and coronal 2D reformatted images were created from the source data and submitted for interpretation  Radiation dose length product (DLP) for this visit:  898 7 mGy-cm   This examination, like all CT scans performed in the [facility], was performed utilizing techniques to minimize radiation dose exposure, including the use of iterative reconstruction and automated exposure control  Enteric contrast was not administered  FINDINGS: CHEST LUNGS:  The trachea and central bronchial tree are patent  Consolidation is seen in the right upper lobe  Airspace opacities are seen within the lung bases  PLEURA:  Unremarkable  HEART/GREAT VESSELS:  Atherosclerotic changes are noted in thoracic aorta and coronary arteries  MEDIASTINUM AND EVELYN:  Subcentimeter mediastinal lymph nodes are seen  Limited evaluation the hilar region due to lack of IV contrast CHEST WALL AND LOWER NECK: There is a 2 2 cm left adrenal gland nodule  Incidental discovery of one or more thyroid nodule(s) measuring more than 1 5 cm and without suspicious features is noted in this patient who is above 28years old; according to guidelines published in the February 2015 white paper on incidental thyroid nodules in the Journal of the [employer] of Radiology Anton Muss), further characterization with thyroid ultrasound is recommended  ABDOMEN LIVER/BILIARY TREE:  Unremarkable  GALLBLADDER:  Gallbladder is not visualized and may be contracted  SPLEEN:  Unremarkable  PANCREAS:  Unremarkable  ADRENAL GLANDS:  Unremarkable  KIDNEYS/URETERS:  No evidence of hydronephrosis 8 mm calcification in the left renal pelvis is noted  STOMACH AND BOWEL:  Liquid stool is seen within the colon    Thickening of the rectal wall with fecal material is noted  Colonic diverticulosis without evidence of acute diverticulitis is seen  APPENDIX:  Not visualized  ABDOMINOPELVIC CAVITY:  Small amount of free fluid in the pelvis  VESSELS:  Atherosclerotic changes are present  No evidence of aneurysm  PELVIS REPRODUCTIVE ORGANS:  Patient is status post hysterectomy  URINARY BLADDER:  Ott catheter is seen within urinary bladder  Mild thickening of the urinary bladder wall is seen  ABDOMINAL WALL/INGUINAL REGIONS:  Multiple injection granulomas are seen in the soft tissues  OSSEOUS STRUCTURES:  Prior extensive spinal surgery is seen  Right ORIF of the femur is noted  Diffuse osteopenia is seen  Impression: Consolidation in the right upper lobe with scattered airspace opacity in lungs suggestive of infection  Follow-up to resolution is recommended  Thickening of the rectal wall with surrounding inflammatory changes  Findings may represent stercoral colitis  Follow-up with GI is recommended  Thickening of the urinary bladder wall which may represent cystitis  Workstation performed: [id number]     Procedure: Xr Chest Portable    Result Date: 3/8/2019  Narrative: CHEST INDICATION:   respiratory failure  Congestive failure  COPD  COMPARISON:  March 7, 2019  EXAM PERFORMED/VIEWS:  XR CHEST PORTABLE FINDINGS: Cardiomediastinal silhouette appears unremarkable  Small right costophrenic angle effusion with overlying density in the medial right lung base which could represent atelectasis or pneumonia  No pneumothorax  No left pleural effusion  Evidence of thoracolumbar spinal fusion  No acute osseous abnormality noted  Impression: Small right costophrenic angle effusion with overlying density in the medial right lung base which could represent atelectasis or pneumonia  The study was marked in [facility] for immediate notification   Workstation performed: [id number]     Procedure: Xr Chest 1 View Portable    Result Date: 3/7/2019  Narrative: CHEST INDICATION:   cp  COMPARISON:  2/14/2019 EXAM PERFORMED/VIEWS:  XR CHEST PORTABLE Images: 1 FINDINGS: Cardiomediastinal silhouette appears unremarkable  Chronic vague left lower lobe opacity with adjacent pleural thickening upper lobe and right lung remain clear  No pneumothorax  Evidence of thoracolumbar spinal fusion  Impression: Chronic vague left lower lobe opacity with adjacent pleural thickening  Workstation performed: [id number]     Procedure: Ct Head Without Contrast    Result Date: 3/7/2019  Narrative: CT BRAIN - WITHOUT CONTRAST INDICATION:   Confusion/delirium, altered LOC, unexplained  COMPARISON:  February 14, 2019 TECHNIQUE:  CT examination of the brain was performed  In addition to axial images, coronal 2D reformatted images were created and submitted for interpretation  Radiation dose length product (DLP) for this visit:  872 mGy-cm   This examination, like all CT scans performed in the [facility], was performed utilizing techniques to minimize radiation dose exposure, including the use of iterative reconstruction and automated exposure control  IMAGE QUALITY:  Diagnostic  FINDINGS: PARENCHYMA:  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  Chronic infarct seen in the right basal ganglionic region involving the predominant and head of the caudate nucleus  Chronic infarct seen in the left basal ganglionic region    Moderate periventricular and white matter hypodensity seen related to chronic small vessel ischemic changes VENTRICLES AND EXTRA-AXIAL SPACES:  Dilation of the ventricle system seen which is out of proportion to that of simple cyst VISUALIZED ORBITS AND PARANASAL SINUSES:  Mucosal thickening seen in the both maxillary sinuses Soft tissue density seen within the left external auditory madhavi Mucosal thickening seen in the sphenoid sinus, ethmoidal air cells and maxillary sinuses, chronic Mild soft tissue density seen within the right external auditory canal l CALVARIUM AND EXTRACRANIAL SOFT TISSUES:  No gross lytic lesion seen     Impression: No acute intracranial hemorrhage seen No new hypodense area seen within the brain parenchyma to suggest territorial infarct Bilateral chronic deep infarcts in the basal ganglionic region, chronic right cerebellar infarct Moderate to periventricular and white matter hypodensity related to chronic small vessel ischemic changes Soft tissue density seen in the left external auditory canal with complete opacification and without any erosive changes  Suggest direct visual inspection and clinical evaluation for definite characterization  This is unchanged, statistically this may be due to cerumen   Mild soft tissue density seen within the right effusion noted  I personally discussed this study with Kiko Townsend on 3/7/2019 at 9:58 AM   Workstation performed: [id number]       Current Facility-Administered Medications   Medication Dose Route Frequency    aspirin (ECOTRIN LOW STRENGTH) EC tablet 81 mg  81 mg Oral Daily    bisacodyl (DULCOLAX) EC tablet 10 mg  10 mg Oral Daily PRN    cefepime (MAXIPIME) IVPB (premix) 2,000 mg  2,000 mg Intravenous Q12H    dextrose 5 % and sodium chloride 0 45 % infusion  100 mL/hr Intravenous Continuous    heparin (porcine) subcutaneous injection 5,000 Units  5,000 Units Subcutaneous Q8H [address]    hydrALAZINE (APRESOLINE) injection 10 mg  10 mg Intravenous Q6H PRN    insulin glargine (LANTUS) subcutaneous injection 20 Units 0 2 mL  20 Units Subcutaneous HS    insulin lispro (HumaLOG) 100 units/mL subcutaneous injection 1-6 Units  1-6 Units Subcutaneous Q6H [address]    ipratropium-albuterol (DUO-NEB) 0 5-2 5 mg/3 mL inhalation solution 3 mL  3 mL Nebulization Q6H While awake    metoprolol (LOPRESSOR) injection 5 mg  5 mg Intravenous Q6H    metroNIDAZOLE (FLAGYL) IVPB (premix) 500 mg  500 mg Intravenous Q8H    potassium chloride 20 mEq IVPB (premix)  20 mEq Intravenous Q4H    sodium chloride (PF) 0 9 % injection 3 mL  3 mL Intravenous PRN     Medications Discontinued During This Encounter   Medication Reason    sodium chloride 0 9 % bolus 2,000 mL     vancomycin (VANCOCIN) IVPB (premix) 1,000 mg Dose adjustment    vancomycin (VANCOCIN) 750 mg in sodium chloride 0 9 % 250 mL IV piggyback Dose adjustment    metoprolol tartrate (LOPRESSOR) tablet 25 mg     insulin lispro (HumaLOG) 100 units/mL subcutaneous injection 1-5 Units     insulin lispro (HumaLOG) 100 units/mL subcutaneous injection 1-5 Units     enoxaparin (LOVENOX) subcutaneous injection 30 mg     insulin lispro (HumaLOG) 100 units/mL subcutaneous injection 1-5 Units     ipratropium-albuterol (DUO-NEB) 0 5-2 5 mg/3 mL inhalation solution 3 mL     vancomycin (VANCOCIN) 750 mg in sodium chloride 0 9 % 250 mL IVPB     vancomycin (VANCOCIN) 750 mg in sodium chloride 0 9 % 250 mL IV piggyback Dose adjustment    sodium chloride 0 9 % infusion     vancomycin (VANCOCIN) 1,500 mg in sodium chloride 0 9 % 250 mL IVPB     lactated ringers infusion     atorvastatin (LIPITOR) tablet 10 mg     sertraline (ZOLOFT) tablet 50 mg     amLODIPine (NORVASC) tablet 10 mg     isosorbide dinitrate (ISORDIL) tablet 30 mg     dextrose 5 % and sodium chloride 0 45 % with KCl 40 mEq/L infusion (premix)     dextrose 5 % and sodium chloride 0 45 % infusion        Negar Plummer MD

## 2019-03-12 NOTE — ASSESSMENT & PLAN NOTE
· Severe sepsis secondary to aspiration pneumonia, colitis  · Will continue cefepime/Flagyl  IV Vancomycin recently discontinued as MRSA swab negative  · Leukocytosis resolved and patient remains afebrile  · Strep pneumo and urine Legionella negative  · Blood cultures negative to date  · Will trend procalcitonin  · After an extensive discussion with the patient's daughter, she notes that she would like to pursue aggressive medical treatment and measures until the point of cardiac arrest, with the understanding the patient will remain DNR/DNI  I did discuss hospice with patient's daughter who is the medical POA and she is resistant to this idea  · Appreciate GI input - As patient is improving, family would like to defer any GI intervention until she is discharged   This was discussed with the patient by Dr Ramandeep Kyle of GI  · Appreciate critical care input

## 2019-03-12 NOTE — ASSESSMENT & PLAN NOTE
· Continue reduced dose of Lantus  · Insulin sliding scale  · Patient is currently NPO she is not awake enough or responsive enough for oral intake

## 2019-03-13 VITALS
TEMPERATURE: 98.8 F | WEIGHT: 182.1 LBS | HEIGHT: 66 IN | OXYGEN SATURATION: 97 % | RESPIRATION RATE: 18 BRPM | SYSTOLIC BLOOD PRESSURE: 131 MMHG | HEART RATE: 64 BPM | BODY MASS INDEX: 29.27 KG/M2 | DIASTOLIC BLOOD PRESSURE: 87 MMHG

## 2019-03-13 PROBLEM — E87.6 HYPOKALEMIA: Status: ACTIVE | Noted: 2019-03-13

## 2019-03-13 LAB
ANION GAP SERPL CALCULATED.3IONS-SCNC: 9 MMOL/L (ref 4–13)
ANISOCYTOSIS BLD QL SMEAR: PRESENT
BASOPHILS # BLD AUTO: 0.1 THOUSAND/UL (ref 0–0.1)
BASOPHILS NFR MAR MANUAL: 1 % (ref 0–1)
BUN SERPL-MCNC: 14 MG/DL (ref 7–25)
CALCIUM SERPL-MCNC: 8.4 MG/DL (ref 8.6–10.5)
CHLORIDE SERPL-SCNC: 114 MMOL/L (ref 98–107)
CO2 SERPL-SCNC: 18 MMOL/L (ref 21–31)
CREAT SERPL-MCNC: 1.07 MG/DL (ref 0.6–1.2)
EOSINOPHIL # BLD AUTO: 0.4 THOUSAND/UL (ref 0–0.61)
EOSINOPHIL NFR BLD MANUAL: 4 % (ref 0–6)
ERYTHROCYTE [DISTWIDTH] IN BLOOD BY AUTOMATED COUNT: 16.1 % (ref 11.5–14.5)
GFR SERPL CREATININE-BSD FRML MDRD: 50 ML/MIN/1.73SQ M
GLUCOSE SERPL-MCNC: 129 MG/DL (ref 65–140)
GLUCOSE SERPL-MCNC: 137 MG/DL (ref 65–140)
GLUCOSE SERPL-MCNC: 141 MG/DL (ref 65–99)
GLUCOSE SERPL-MCNC: 149 MG/DL (ref 65–140)
GLUCOSE SERPL-MCNC: 183 MG/DL (ref 65–140)
HCT VFR BLD AUTO: 30.2 % (ref 42–47)
HGB BLD-MCNC: 10 G/DL (ref 12–16)
LYMPHOCYTES # BLD AUTO: 1.29 THOUSAND/UL (ref 0.6–4.47)
LYMPHOCYTES # BLD AUTO: 13 % (ref 20–51)
MACROCYTES BLD QL AUTO: PRESENT
MAGNESIUM SERPL-MCNC: 1.3 MG/DL (ref 1.9–2.7)
MCH RBC QN AUTO: 33.1 PG (ref 26–34)
MCHC RBC AUTO-ENTMCNC: 33.2 G/DL (ref 31–37)
MCV RBC AUTO: 100 FL (ref 81–99)
METAMYELOCYTES NFR BLD MANUAL: 1 % (ref 0–1)
MONOCYTES # BLD AUTO: 0.79 THOUSAND/UL (ref 0–1.22)
MONOCYTES NFR BLD AUTO: 8 % (ref 4–12)
NEUTS BAND NFR BLD MANUAL: 2 % (ref 0–8)
NEUTS SEG # BLD: 7.23 THOUSAND/UL (ref 1.81–6.82)
NEUTS SEG NFR BLD AUTO: 71 % (ref 42–75)
NRBC BLD AUTO-RTO: 0 /100 WBCS
PLATELET # BLD AUTO: 175 THOUSANDS/UL (ref 149–390)
PLATELET BLD QL SMEAR: ADEQUATE
PMV BLD AUTO: 9.8 FL (ref 8.6–11.7)
POTASSIUM SERPL-SCNC: 3.1 MMOL/L (ref 3.5–5.5)
PROCALCITONIN SERPL-MCNC: 3.06 NG/ML
RBC # BLD AUTO: 3.02 MILLION/UL (ref 3.9–5.2)
SODIUM SERPL-SCNC: 141 MMOL/L (ref 134–143)
TOTAL CELLS COUNTED SPEC: 100
WBC # BLD AUTO: 9.9 THOUSAND/UL (ref 4.8–10.8)

## 2019-03-13 PROCEDURE — 84145 PROCALCITONIN (PCT): CPT | Performed by: HOSPITALIST

## 2019-03-13 PROCEDURE — 94760 N-INVAS EAR/PLS OXIMETRY 1: CPT

## 2019-03-13 PROCEDURE — 99239 HOSP IP/OBS DSCHRG MGMT >30: CPT | Performed by: INTERNAL MEDICINE

## 2019-03-13 PROCEDURE — 82948 REAGENT STRIP/BLOOD GLUCOSE: CPT

## 2019-03-13 PROCEDURE — 85007 BL SMEAR W/DIFF WBC COUNT: CPT | Performed by: HOSPITALIST

## 2019-03-13 PROCEDURE — 80048 BASIC METABOLIC PNL TOTAL CA: CPT | Performed by: HOSPITALIST

## 2019-03-13 PROCEDURE — 94640 AIRWAY INHALATION TREATMENT: CPT

## 2019-03-13 PROCEDURE — 83735 ASSAY OF MAGNESIUM: CPT | Performed by: INTERNAL MEDICINE

## 2019-03-13 PROCEDURE — 85027 COMPLETE CBC AUTOMATED: CPT | Performed by: HOSPITALIST

## 2019-03-13 RX ORDER — POTASSIUM CHLORIDE 20 MEQ/1
20 TABLET, EXTENDED RELEASE ORAL 2 TIMES DAILY
Status: DISCONTINUED | OUTPATIENT
Start: 2019-03-13 | End: 2019-03-13 | Stop reason: HOSPADM

## 2019-03-13 RX ORDER — POTASSIUM CHLORIDE 14.9 MG/ML
20 INJECTION INTRAVENOUS EVERY 4 HOURS
Status: DISCONTINUED | OUTPATIENT
Start: 2019-03-13 | End: 2019-03-13 | Stop reason: HOSPADM

## 2019-03-13 RX ORDER — POTASSIUM CHLORIDE 20 MEQ/1
20 TABLET, EXTENDED RELEASE ORAL 2 TIMES DAILY
Qty: 14 TABLET | Refills: 0
Start: 2019-03-13 | End: 2019-03-20

## 2019-03-13 RX ORDER — INSULIN GLARGINE 100 [IU]/ML
15 INJECTION, SOLUTION SUBCUTANEOUS
Refills: 0
Start: 2019-03-13

## 2019-03-13 RX ADMIN — POTASSIUM CHLORIDE 20 MEQ: 1500 TABLET, EXTENDED RELEASE ORAL at 13:41

## 2019-03-13 RX ADMIN — SODIUM CHLORIDE, POTASSIUM CHLORIDE, SODIUM LACTATE AND CALCIUM CHLORIDE 75 ML/HR: 600; 310; 30; 20 INJECTION, SOLUTION INTRAVENOUS at 12:15

## 2019-03-13 RX ADMIN — ASPIRIN 81 MG: 81 TABLET, COATED ORAL at 10:45

## 2019-03-13 RX ADMIN — INSULIN LISPRO 1 UNITS: 100 INJECTION, SOLUTION INTRAVENOUS; SUBCUTANEOUS at 00:58

## 2019-03-13 RX ADMIN — IPRATROPIUM BROMIDE AND ALBUTEROL SULFATE 3 ML: 2.5; .5 SOLUTION RESPIRATORY (INHALATION) at 14:01

## 2019-03-13 RX ADMIN — HEPARIN SODIUM 5000 UNITS: 5000 INJECTION, SOLUTION INTRAVENOUS; SUBCUTANEOUS at 13:40

## 2019-03-13 RX ADMIN — POTASSIUM CHLORIDE 20 MEQ: 200 INJECTION, SOLUTION INTRAVENOUS at 12:36

## 2019-03-13 RX ADMIN — SODIUM CHLORIDE, POTASSIUM CHLORIDE, SODIUM LACTATE AND CALCIUM CHLORIDE 75 ML/HR: 600; 310; 30; 20 INJECTION, SOLUTION INTRAVENOUS at 05:11

## 2019-03-13 RX ADMIN — IPRATROPIUM BROMIDE AND ALBUTEROL SULFATE 3 ML: 2.5; .5 SOLUTION RESPIRATORY (INHALATION) at 07:25

## 2019-03-13 RX ADMIN — METOPROLOL TARTRATE 5 MG: 5 INJECTION, SOLUTION INTRAVENOUS at 05:04

## 2019-03-13 RX ADMIN — CEFEPIME HYDROCHLORIDE 2000 MG: 2 INJECTION, SOLUTION INTRAVENOUS at 12:03

## 2019-03-13 RX ADMIN — METRONIDAZOLE 500 MG: 500 INJECTION, SOLUTION INTRAVENOUS at 13:41

## 2019-03-13 RX ADMIN — HEPARIN SODIUM 5000 UNITS: 5000 INJECTION, SOLUTION INTRAVENOUS; SUBCUTANEOUS at 05:07

## 2019-03-13 RX ADMIN — METOPROLOL TARTRATE 5 MG: 5 INJECTION, SOLUTION INTRAVENOUS at 10:52

## 2019-03-13 RX ADMIN — METRONIDAZOLE 500 MG: 500 INJECTION, SOLUTION INTRAVENOUS at 05:04

## 2019-03-13 NOTE — SPEECH THERAPY NOTE
Speech/Language Pathology Progress Note    Patient Name: Kim Huntley  XPDAV'H Date: 3/13/2019     Pt for D/C back to [facility]  ST orders completed at this time

## 2019-03-13 NOTE — PROGRESS NOTES
Progress Note - Nephrology   Kevin Bejarano 66 y o  female MRN: [id number]  Unit/Bed#: -01 Encounter: [id number]    A/P:  1  Acute kidney injury: partly due to volume depletion as she is not taking po  She had an improvement in renal function with IVF  However, she is septic and may have ATN  Urine electrolyte have been ordered  Continue NS at 125 ml/hr  The family has declined a peg  3/10: kidney function improved  IVF changed to 1/2 NS due to mild hyperrnatremia  2  Sepsis: probably due to aspiration pneumonia  She is receiving antibiotics  3  Dysphagia: comfort care being offered as patient's family do not want a peg   4  Hyperchloremic metabolic acidosis: continue IVF and obtain serial studies  3/12: she has a nonanion gap metabolic acidosis  Causes include diarrhea which she is having, saline fluid restoration and other more esoteric causes in a patient with a previously normal acid base pattern  Doubt distal renal tubular acidosis but will check a urine pH which should be > 5 5  Hyperaldosteronism can cause hypokalemia  Will  Check urine electrolytes  Will check a UA to make sure she does not have starvation ketosis  She needs a peg tube or NG feeds to improve her hydration status and give appropriate water  Will check a blood gas and if acidotic can give IV bicarbonate  She is npo and cannot give oral bicarbonate tabs  Would change to lactated ringers solution for conversion to bicarbonate and to avoid saline  3/13: hypokalemia will be treated   Check magnesium level        Follow up reason for today's visit: acute kidney injury    Severe sepsis [facility])    Patient Active Problem List   Diagnosis    Bronchospasm    Renal insufficiency    Type 2 diabetes mellitus with complication, without long-term current use of insulin ([address] )    Pressure ulcer of left buttock, stage 2    Other dysphagia    Severe sepsis (HCC)    Acute on chronic respiratory failure with hypoxia and hypercapnia (HCC)    Colitis    RUTH ANN (acute kidney injury) ([facility] [address] )         Subjective:   Nonverbal and cannot offer ROS    Objective:     Vitals: Blood pressure 131/87, pulse 64, temperature 98 8 °F (37 1 °C), temperature source Temporal, resp  rate 18, height 5' 6" (1 676 m), weight 82 6 kg (182 lb 1 6 oz), SpO2 97 %, not currently breastfeeding  ,Body mass index is 29 39 kg/m²  Weight (last 2 days)     Date/Time   Weight    03/12/19 0600   82 6 (182 1)                Intake/Output Summary (Last 24 hours) at 3/13/2019 0953  Last data filed at 3/13/2019 0600  Gross per 24 hour   Intake 898 33 ml   Output 800 ml   Net 98 33 ml     I/O last 3 completed shifts: In: 2053 3 [I V :1903 3; IV Piggyback:150]  Out: 4148 [Urine:1775]    Urethral Catheter 16 Fr  (Active)   Amt returned on insertion(mL) 200 mL 3/7/2019  8:40 AM   Site Assessment Clean 3/7/2019  8:40 AM   Collection Container Standard drainage bag 3/7/2019  8:40 AM   Securement Method Securing device (Describe) 3/9/2019  4:00 AM   Output (mL) 200 mL 3/9/2019  6:00 AM       Physical Exam: /87 (BP Location: Left arm)   Pulse 64   Temp 98 8 °F (37 1 °C) (Temporal)   Resp 18   Ht 5' 6" (1 676 m)   Wt 82 6 kg (182 lb 1 6 oz)   LMP  (LMP Unknown)   SpO2 97%   Breastfeeding? No   BMI 29 39 kg/m²     General Appearance:    Lethargic  Opens eyes to verbal and tactile prodding    Is nonverbal   Head:    Normocephalic, without obvious abnormality, atraumatic   Eyes:    Conjunctiva/corneas clear   Ears:    Normal external ears   Nose:   Nares normal, septum midline, mucosa normal, no drainage    or sinus tenderness   Throat:   Lips, mucosa, and tongue normal; teeth and gums normal   Neck:   Supple, symmetrical, trachea midline, no adenopathy;        thyroid:  No enlargement/tenderness/nodules; no carotid    bruit or JVD   Back:     Symmetric, no curvature, ROM normal, no CVA tenderness   Lungs:     Exp rhonchi R>>L, respirations unlabored   Chest wall:    No tenderness or deformity   Heart:    Regular rate and rhythm, S1 and S2 normal, no murmur, rub   or gallop   Abdomen:     Soft, non-tender, bowel sounds active   Extremities:   Extremities normal, atraumatic, no cyanosis or edema   Skin:   Skin color, texture, turgor normal, no rashes or lesions   Lymph nodes:   Cervical normal   Neurologic:   CNII-XII not assessed            Lab, Imaging and other studies: I have personally reviewed pertinent labs  CBC:   Lab Results   Component Value Date    WBC 9 90 03/13/2019    HGB 10 0 (L) 03/13/2019    HCT 30 2 (L) 03/13/2019     (H) 03/13/2019     03/13/2019    MCH 33 1 03/13/2019    MCHC 33 2 03/13/2019    RDW 16 1 (H) 03/13/2019    MPV 9 8 03/13/2019    NRBC 0 03/13/2019     CMP:   Lab Results   Component Value Date    K 3 1 (L) 03/13/2019     (H) 03/13/2019    CO2 18 (L) 03/13/2019    BUN 14 03/13/2019    CREATININE 1 07 03/13/2019    CALCIUM 8 4 (L) 03/13/2019    EGFR 50 03/13/2019           Results from last 7 days   Lab Units 03/13/19  0453 03/12/19  0445 03/11/19  0439 03/10/19  0448 03/09/19  0425   POTASSIUM mmol/L 3 1* 3 0* 3 0* 3 6 4 2   CHLORIDE mmol/L 114* 114* 112* 119* 116*   CO2 mmol/L 18* 16* 19* 18* 18*   BUN mg/dL 14 18 22 30* 37*   CREATININE mg/dL 1 07 1 07 1 28* 1 39* 1 68*   CALCIUM mg/dL 8 4* 8 2* 8 2* 8 3* 8 1*   ALK PHOS U/L  --   --  48* 47* 48*   ALT U/L  --   --  8 9 8   AST U/L  --   --  10* 10* 11*         Phosphorus: No results found for: PHOS  Magnesium: No results found for: MG  Urinalysis:   Lab Results   Component Value Date    COLORU Yellow 03/12/2019    CLARITYU Cloudy (A) 03/12/2019    SPECGRAV 1 025 03/12/2019    PHUR 5 5 03/12/2019    LEUKOCYTESUR 3+ (A) 03/12/2019    NITRITE Positive (A) 03/12/2019    GLUCOSEU Negative 03/12/2019    KETONESU Trace (A) 03/12/2019    BILIRUBINUR Negative 03/12/2019    BLOODU 1+ (A) 03/12/2019     Ionized Calcium: No results found for: CAION  Coagulation:   No results found for: PT, INR, APTT  Troponin: No results found for: TROPONINI  ABG:   Lab Results   Component Value Date    PHART 7 390 03/12/2019    URU5TSD 25 2 (LL) 03/12/2019    PO2ART 83 0 03/12/2019    IWL3CBM 15 0 (L) 03/12/2019    BEART -8 5 (L) 03/12/2019    SOURCE Radial, Right 03/12/2019     Radiology review:     IMAGING  Procedure: Ct Chest Abdomen Pelvis Wo Contrast    Result Date: 3/7/2019  Narrative: CT CHEST, ABDOMEN AND PELVIS WITHOUT IV CONTRAST INDICATION:   Pneumonia complicated / unresolved Shortness of breath  COMPARISON:  August 22, 2017 TECHNIQUE: CT examination of the chest, abdomen and pelvis was performed without intravenous contrast   Axial, sagittal, and coronal 2D reformatted images were created from the source data and submitted for interpretation  Radiation dose length product (DLP) for this visit:  898 7 mGy-cm   This examination, like all CT scans performed in the [facility], was performed utilizing techniques to minimize radiation dose exposure, including the use of iterative reconstruction and automated exposure control  Enteric contrast was not administered  FINDINGS: CHEST LUNGS:  The trachea and central bronchial tree are patent  Consolidation is seen in the right upper lobe  Airspace opacities are seen within the lung bases  PLEURA:  Unremarkable  HEART/GREAT VESSELS:  Atherosclerotic changes are noted in thoracic aorta and coronary arteries  MEDIASTINUM AND EVELYN:  Subcentimeter mediastinal lymph nodes are seen  Limited evaluation the hilar region due to lack of IV contrast CHEST WALL AND LOWER NECK: There is a 2 2 cm left adrenal gland nodule    Incidental discovery of one or more thyroid nodule(s) measuring more than 1 5 cm and without suspicious features is noted in this patient who is above 28years old; according to guidelines published in the February 2015 white paper on incidental thyroid nodules in the Journal of the [employer] of Radiology [place]), further characterization with thyroid ultrasound is recommended  ABDOMEN LIVER/BILIARY TREE:  Unremarkable  GALLBLADDER:  Gallbladder is not visualized and may be contracted  SPLEEN:  Unremarkable  PANCREAS:  Unremarkable  ADRENAL GLANDS:  Unremarkable  KIDNEYS/URETERS:  No evidence of hydronephrosis 8 mm calcification in the left renal pelvis is noted  STOMACH AND BOWEL:  Liquid stool is seen within the colon  Thickening of the rectal wall with fecal material is noted  Colonic diverticulosis without evidence of acute diverticulitis is seen  APPENDIX:  Not visualized  ABDOMINOPELVIC CAVITY:  Small amount of free fluid in the pelvis  VESSELS:  Atherosclerotic changes are present  No evidence of aneurysm  PELVIS REPRODUCTIVE ORGANS:  Patient is status post hysterectomy  URINARY BLADDER:  Ott catheter is seen within urinary bladder  Mild thickening of the urinary bladder wall is seen  ABDOMINAL WALL/INGUINAL REGIONS:  Multiple injection granulomas are seen in the soft tissues  OSSEOUS STRUCTURES:  Prior extensive spinal surgery is seen  Right ORIF of the femur is noted  Diffuse osteopenia is seen  Impression: Consolidation in the right upper lobe with scattered airspace opacity in lungs suggestive of infection  Follow-up to resolution is recommended  Thickening of the rectal wall with surrounding inflammatory changes  Findings may represent stercoral colitis  Follow-up with GI is recommended  Thickening of the urinary bladder wall which may represent cystitis  Workstation performed: [id number]     Procedure: Xr Chest Portable    Result Date: 3/8/2019  Narrative: CHEST INDICATION:   respiratory failure  Congestive failure  COPD  COMPARISON:  March 7, 2019  EXAM PERFORMED/VIEWS:  XR CHEST PORTABLE FINDINGS: Cardiomediastinal silhouette appears unremarkable  Small right costophrenic angle effusion with overlying density in the medial right lung base which could represent atelectasis or pneumonia  No pneumothorax  No left pleural effusion  Evidence of thoracolumbar spinal fusion  No acute osseous abnormality noted  Impression: Small right costophrenic angle effusion with overlying density in the medial right lung base which could represent atelectasis or pneumonia  The study was marked in [facility] for immediate notification  Workstation performed: [id number]     Procedure: Xr Chest 1 View Portable    Result Date: 3/7/2019  Narrative: CHEST INDICATION:   cp  COMPARISON:  2/14/2019 EXAM PERFORMED/VIEWS:  XR CHEST PORTABLE Images: 1 FINDINGS: Cardiomediastinal silhouette appears unremarkable  Chronic vague left lower lobe opacity with adjacent pleural thickening upper lobe and right lung remain clear  No pneumothorax  Evidence of thoracolumbar spinal fusion  Impression: Chronic vague left lower lobe opacity with adjacent pleural thickening  Workstation performed: [id number]     Procedure: Ct Head Without Contrast    Result Date: 3/7/2019  Narrative: CT BRAIN - WITHOUT CONTRAST INDICATION:   Confusion/delirium, altered LOC, unexplained  COMPARISON:  February 14, 2019 TECHNIQUE:  CT examination of the brain was performed  In addition to axial images, coronal 2D reformatted images were created and submitted for interpretation  Radiation dose length product (DLP) for this visit:  872 mGy-cm   This examination, like all CT scans performed in the [facility], was performed utilizing techniques to minimize radiation dose exposure, including the use of iterative reconstruction and automated exposure control  IMAGE QUALITY:  Diagnostic  FINDINGS: PARENCHYMA:  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  Chronic infarct seen in the right basal ganglionic region involving the predominant and head of the caudate nucleus  Chronic infarct seen in the left basal ganglionic region    Moderate periventricular and white matter hypodensity seen related to chronic small vessel ischemic changes VENTRICLES AND EXTRA-AXIAL SPACES:  Dilation of the ventricle system seen which is out of proportion to that of simple cyst VISUALIZED ORBITS AND PARANASAL SINUSES:  Mucosal thickening seen in the both maxillary sinuses Soft tissue density seen within the left external auditory madhavi Mucosal thickening seen in the sphenoid sinus, ethmoidal air cells and maxillary sinuses, chronic Mild soft tissue density seen within the right external auditory canal l CALVARIUM AND EXTRACRANIAL SOFT TISSUES:  No gross lytic lesion seen     Impression: No acute intracranial hemorrhage seen No new hypodense area seen within the brain parenchyma to suggest territorial infarct Bilateral chronic deep infarcts in the basal ganglionic region, chronic right cerebellar infarct Moderate to periventricular and white matter hypodensity related to chronic small vessel ischemic changes Soft tissue density seen in the left external auditory canal with complete opacification and without any erosive changes  Suggest direct visual inspection and clinical evaluation for definite characterization  This is unchanged, statistically this may be due to cerumen   Mild soft tissue density seen within the right effusion noted  I personally discussed this study with Danial Hudson on 3/7/2019 at 9:58 AM   Workstation performed: [id number]       Current Facility-Administered Medications   Medication Dose Route Frequency    aspirin (ECOTRIN LOW STRENGTH) EC tablet 81 mg  81 mg Oral Daily    bisacodyl (DULCOLAX) EC tablet 10 mg  10 mg Oral Daily PRN    cefepime (MAXIPIME) IVPB (premix) 2,000 mg  2,000 mg Intravenous Q12H    heparin (porcine) subcutaneous injection 5,000 Units  5,000 Units Subcutaneous Q8H [address]    hydrALAZINE (APRESOLINE) injection 10 mg  10 mg Intravenous Q6H PRN    insulin glargine (LANTUS) subcutaneous injection 20 Units 0 2 mL  20 Units Subcutaneous HS    insulin lispro (HumaLOG) 100 units/mL subcutaneous injection 1-6 Units  1-6 Units Subcutaneous Q6H [address]    ipratropium-albuterol (DUO-NEB) 0 5-2 5 mg/3 mL inhalation solution 3 mL  3 mL Nebulization Q6H While awake    lactated ringers infusion  75 mL/hr Intravenous Continuous    metoprolol (LOPRESSOR) injection 5 mg  5 mg Intravenous Q6H    metroNIDAZOLE (FLAGYL) IVPB (premix) 500 mg  500 mg Intravenous Q8H    sodium chloride (PF) 0 9 % injection 3 mL  3 mL Intravenous PRN     Medications Discontinued During This Encounter   Medication Reason    sodium chloride 0 9 % bolus 2,000 mL     vancomycin (VANCOCIN) IVPB (premix) 1,000 mg Dose adjustment    vancomycin (VANCOCIN) 750 mg in sodium chloride 0 9 % 250 mL IV piggyback Dose adjustment    metoprolol tartrate (LOPRESSOR) tablet 25 mg     insulin lispro (HumaLOG) 100 units/mL subcutaneous injection 1-5 Units     insulin lispro (HumaLOG) 100 units/mL subcutaneous injection 1-5 Units     enoxaparin (LOVENOX) subcutaneous injection 30 mg     insulin lispro (HumaLOG) 100 units/mL subcutaneous injection 1-5 Units     ipratropium-albuterol (DUO-NEB) 0 5-2 5 mg/3 mL inhalation solution 3 mL     vancomycin (VANCOCIN) 750 mg in sodium chloride 0 9 % 250 mL IVPB     vancomycin (VANCOCIN) 750 mg in sodium chloride 0 9 % 250 mL IV piggyback Dose adjustment    sodium chloride 0 9 % infusion     vancomycin (VANCOCIN) 1,500 mg in sodium chloride 0 9 % 250 mL IVPB     lactated ringers infusion     atorvastatin (LIPITOR) tablet 10 mg     sertraline (ZOLOFT) tablet 50 mg     amLODIPine (NORVASC) tablet 10 mg     isosorbide dinitrate (ISORDIL) tablet 30 mg     dextrose 5 % and sodium chloride 0 45 % with KCl 40 mEq/L infusion (premix)     dextrose 5 % and sodium chloride 0 45 % infusion     dextrose 5 % and sodium chloride 0 45 % infusion        Claritza Meadows MD

## 2019-03-13 NOTE — PLAN OF CARE
Problem: Potential for Falls  Goal: Patient will remain free of falls  Description  INTERVENTIONS:  - Assess patient frequently for physical needs  -  Identify cognitive and physical deficits and behaviors that affect risk of falls  -  [place] fall precautions as indicated by assessment   - Educate patient/family on patient safety including physical limitations  - Instruct patient to call for assistance with activity based on assessment  - Modify environment to reduce risk of injury  - Consider OT/PT consult to assist with strengthening/mobility  Outcome: Progressing     Problem: Prexisting or High Potential for Compromised Skin Integrity  Goal: Skin integrity is maintained or improved  Description  INTERVENTIONS:  - Identify patients at risk for skin breakdown  - Assess and monitor skin integrity  - Assess and monitor nutrition and hydration status  - Monitor labs (i e  albumin)  - Assess for incontinence   - Turn and reposition patient  - Assist with mobility/ambulation  - Relieve pressure over bony prominences  - Avoid friction and shearing  - Provide appropriate hygiene as needed including keeping skin clean and dry  - Evaluate need for skin moisturizer/barrier cream  - Collaborate with interdisciplinary team (i e  Nutrition, Rehabilitation, etc )   - Patient/family teaching  Outcome: Progressing     Problem: Nutrition/Hydration-ADULT  Goal: Nutrient/Hydration intake appropriate for improving, restoring or maintaining nutritional needs  Description  Monitor and assess patient's nutrition/hydration status for malnutrition (ex- brittle hair, bruises, dry skin, pale skin and conjunctiva, muscle wasting, smooth red tongue, and disorientation)  Collaborate with interdisciplinary team and initiate plan and interventions as ordered  Monitor patient's weight and dietary intake as ordered or per policy  Utilize nutrition screening tool and intervene per policy   Determine patient's food preferences and provide high-protein, high-caloric foods as appropriate       INTERVENTIONS:  - Monitor oral intake, urinary output, labs, and treatment plans  - Assess nutrition and hydration status and recommend course of action  - Evaluate amount of meals eaten  - Assist patient with eating if necessary   - Allow adequate time for meals  - Recommend/ encourage appropriate diets, oral nutritional supplements, and vitamin/mineral supplements  - Order, calculate, and assess calorie counts as needed  - Recommend, monitor, and adjust tube feedings and TPN/PPN based on assessed needs  - Assess need for intravenous fluids  - Provide specific nutrition/hydration education as appropriate  - Include patient/family/caregiver in decisions related to nutrition  Outcome: Progressing     Problem: DISCHARGE PLANNING - CARE MANAGEMENT  Goal: Discharge to post-acute care or home with appropriate resources  Description  INTERVENTIONS:  - Conduct assessment to determine patient/family and health care team treatment goals, and need for post-acute services based on payer coverage, community resources, and patient preferences, and barriers to discharge  - Address psychosocial, clinical, and financial barriers to discharge as identified in assessment in conjunction with the patient/family and health care team  - Arrange appropriate level of post-acute services according to patient?s   needs and preference and payer coverage in collaboration with the physician and health care team  - Communicate with and update the patient/family, physician, and health care team regarding progress on the discharge plan  - Arrange appropriate transportation to post-acute venues  Outcome: Progressing     Problem: PAIN - ADULT  Goal: Verbalizes/displays adequate comfort level or baseline comfort level  Description  Interventions:  - Encourage patient to monitor pain and request assistance  - Assess pain using appropriate pain scale  - Administer analgesics based on type and severity of pain and evaluate response  - Implement non-pharmacological measures as appropriate and evaluate response  - Consider cultural and social influences on pain and pain management  - Notify physician/advanced practitioner if interventions unsuccessful or patient reports new pain  Outcome: Progressing     Problem: INFECTION - ADULT  Goal: Absence or prevention of progression during hospitalization  Description  INTERVENTIONS:  - Assess and monitor for signs and symptoms of infection  - Monitor lab/diagnostic results  - Monitor all insertion sites, i e  indwelling lines, tubes, and drains  - Monitor endotracheal (as able) and nasal secretions for changes in amount and color  - [place] appropriate cooling/warming therapies per order  - Administer medications as ordered  - Instruct and encourage patient and family to use good hand hygiene technique  - Identify and instruct in appropriate isolation precautions for identified infection/condition  Outcome: Progressing  Goal: Absence of fever/infection during neutropenic period  Description  INTERVENTIONS:  - Monitor WBC  - Implement neutropenic guidelines  Outcome: Progressing     Problem: SAFETY ADULT  Goal: Patient will remain free of falls  Description  INTERVENTIONS:  - Assess patient frequently for physical needs  -  Identify cognitive and physical deficits and behaviors that affect risk of falls    -  [place] fall precautions as indicated by assessment   - Educate patient/family on patient safety including physical limitations  - Instruct patient to call for assistance with activity based on assessment  - Modify environment to reduce risk of injury  - Consider OT/PT consult to assist with strengthening/mobility  Outcome: Progressing  Goal: Maintain or return to baseline ADL function  Description  INTERVENTIONS:  -  Assess patient's ability to carry out ADLs; assess patient's baseline for ADL function and identify physical deficits which impact ability to perform ADLs (bathing, care of mouth/teeth, toileting, grooming, dressing, etc )  - Assess/evaluate cause of self-care deficits   - Assess range of motion  - Assess patient's mobility; develop plan if impaired  - Assess patient's need for assistive devices and provide as appropriate  - Encourage maximum independence but intervene and supervise when necessary  ¯ Involve family in performance of ADLs  ¯ Assess for home care needs following discharge   ¯ Request OT consult to assist with ADL evaluation and planning for discharge  ¯ Provide patient education as appropriate  Outcome: Progressing  Goal: Maintain or return mobility status to optimal level  Description  INTERVENTIONS:  - Assess patient's baseline mobility status (ambulation, transfers, stairs, etc )    - Identify cognitive and physical deficits and behaviors that affect mobility  - Identify mobility aids required to assist with transfers and/or ambulation (gait belt, sit-to-stand, lift, walker, cane, etc )  - [place] fall precautions as indicated by assessment  - Record patient progress and toleration of activity level on Mobility SBAR; progress patient to next Phase/Stage  - Instruct patient to call for assistance with activity based on assessment  - Request Rehabilitation consult to assist with strengthening/weightbearing, etc   Outcome: Progressing     Problem: Knowledge Deficit  Goal: Patient/family/caregiver demonstrates understanding of disease process, treatment plan, medications, and discharge instructions  Description  Complete learning assessment and assess knowledge base    Interventions:  - Provide teaching at level of understanding  - Provide teaching via preferred learning methods  Outcome: Progressing     Problem: NEUROSENSORY - ADULT  Goal: Achieves stable or improved neurological status  Description  INTERVENTIONS  - Monitor and report changes in neurological status  - Initiate measures to prevent increased intracranial pressure  - Maintain blood pressure and fluid volume within ordered parameters to optimize cerebral perfusion  - Monitor temperature, glucose, and sodium or any other associated labs  Initiate appropriate interventions as ordered  - Monitor for seizure activity   - Administer anti-seizure medications as ordered  Outcome: Progressing  Goal: Remains free of injury related to seizures activity  Description  INTERVENTIONS  - Maintain airway, patient safety  and administer oxygen as ordered  - Monitor patient for seizure activity, document and report duration and description of seizure to physician/advanced practitioner  - If seizure occurs,  ensure patient safety during seizure  - Reorient patient post seizure  - Seizure pads on all 4 side rails  - Instruct patient/family to notify RN of any seizure activity including if an aura is experienced  - Instruct patient/family to call for assistance with activity based on nursing assessment  - Administer anti-seizure medications as ordered  - Monitor fetal well being  Outcome: Progressing  Goal: Achieves maximal functionality and self care  Description  INTERVENTIONS  - Monitor swallowing and airway patency with patient fatigue and changes in neurological status  - Encourage and assist patient to increase activity and self care with guidance from rehab services  - Encourage visually impaired, hearing impaired and aphasic patients to use assistive/communication devices  Outcome: Progressing     Problem: CARDIOVASCULAR - ADULT  Goal: Maintains optimal cardiac output and hemodynamic stability  Description  INTERVENTIONS:  - Monitor I/O, vital signs and rhythm  - Monitor for S/S and trends of decreased cardiac output i e  bleeding, hypotension  - Administer and titrate ordered vasoactive medications to optimize hemodynamic stability  - Assess quality of pulses, skin color and temperature  - Assess for signs of decreased coronary artery perfusion - ex   Angina  - Instruct patient to report change in severity of symptoms  Outcome: Progressing  Goal: Absence of cardiac dysrhythmias or at baseline rhythm  Description  INTERVENTIONS:  - Continuous cardiac monitoring, monitor vital signs, obtain 12 lead EKG if indicated  - Administer antiarrhythmic and heart rate control medications as ordered  - Monitor electrolytes and administer replacement therapy as ordered  Outcome: Progressing     Problem: RESPIRATORY - ADULT  Goal: Achieves optimal ventilation and oxygenation  Description  INTERVENTIONS:  - Assess for changes in respiratory status  - Assess for changes in mentation and behavior  - Position to facilitate oxygenation and minimize respiratory effort  - Oxygen administration by appropriate delivery method based on oxygen saturation (per order) or ABGs  - Initiate smoking cessation education as indicated  - Encourage broncho-pulmonary hygiene including cough, deep breathe, Incentive Spirometry  - Assess the need for suctioning and aspirate as needed  - Assess and instruct to report SOB or any respiratory difficulty  - Respiratory Therapy support as indicated  Outcome: Progressing     Problem: GASTROINTESTINAL - ADULT  Goal: Maintains or returns to baseline bowel function  Description  INTERVENTIONS:  - Assess bowel function  - Encourage oral fluids to ensure adequate hydration  - Administer IV fluids as ordered to ensure adequate hydration  - Administer ordered medications as needed  - Encourage mobilization and activity  - Nutrition services referral to assist patient with appropriate food choices  Outcome: Progressing  Goal: Maintains adequate nutritional intake  Description  INTERVENTIONS:  - Monitor percentage of each meal consumed  - Identify factors contributing to decreased intake, treat as appropriate  - Assist with meals as needed  - Monitor I&O, WT and lab values  - Obtain nutrition services referral as needed  Outcome: Progressing  Goal: Establish and maintain optimal ostomy function  Description  INTERVENTIONS:  - Assess bowel function  - Encourage oral fluids to ensure adequate hydration  - Administer IV fluids as ordered to ensure adequate hydration  - Administer ordered medications as needed  - Encourage mobilization and activity  - Nutrition services referral to assist patient with appropriate food choices  - Assess stoma site  Outcome: Progressing     Problem: GENITOURINARY - ADULT  Goal: Maintains or returns to baseline urinary function  Description  INTERVENTIONS:  - Assess urinary function  - Encourage oral fluids to ensure adequate hydration  - Administer IV fluids as ordered to ensure adequate hydration  - Administer ordered medications as needed  - Offer frequent toileting  - Follow urinary retention protocol if ordered  Outcome: Progressing  Goal: Absence of urinary retention  Description  INTERVENTIONS:  - Assess patient?s ability to void and empty bladder  - Monitor I/O  - Bladder scan as needed  - Discuss with physician/AP medications to alleviate retention as needed  - Discuss catheterization for long term situations as appropriate  Outcome: Progressing  Goal: Urinary catheter remains patent  Description  INTERVENTIONS:  - Assess patency of urinary catheter  - If patient has a chronic hawkins, consider changing catheter if non-functioning  - Follow guidelines for intermittent irrigation of non-functioning urinary catheter  Outcome: Progressing     Problem: METABOLIC, FLUID AND ELECTROLYTES - ADULT  Goal: Electrolytes maintained within normal limits  Description  INTERVENTIONS:  - Monitor labs and assess patient for signs and symptoms of electrolyte imbalances  - Administer electrolyte replacement as ordered  - Monitor response to electrolyte replacements, including repeat lab results as appropriate  - Instruct patient on fluid and nutrition as appropriate  Outcome: Progressing  Goal: Fluid balance maintained  Description  INTERVENTIONS:  - Monitor labs and assess for signs and symptoms of volume excess or deficit  - Monitor I/O and WT  - Instruct patient on fluid and nutrition as appropriate  Outcome: Progressing  Goal: Glucose maintained within target range  Description  INTERVENTIONS:  - Monitor Blood Glucose as ordered  - Assess for signs and symptoms of hyperglycemia and hypoglycemia  - Administer ordered medications to maintain glucose within target range  - Assess nutritional intake and initiate nutrition service referral as needed  Outcome: Progressing     Problem: SKIN/TISSUE INTEGRITY - ADULT  Goal: Skin integrity remains intact  Description  INTERVENTIONS  - Identify patients at risk for skin breakdown  - Assess and monitor skin integrity  - Assess and monitor nutrition and hydration status  - Monitor labs (i e  albumin)  - Assess for incontinence   - Turn and reposition patient  - Assist with mobility/ambulation  - Relieve pressure over bony prominences  - Avoid friction and shearing  - Provide appropriate hygiene as needed including keeping skin clean and dry  - Evaluate need for skin moisturizer/barrier cream  - Collaborate with interdisciplinary team (i e  Nutrition, Rehabilitation, etc )   - Patient/family teaching  Outcome: Progressing  Goal: Incision(s), wounds(s) or drain site(s) healing without S/S of infection  Description  INTERVENTIONS  - Assess and document risk factors for skin impairment   - Assess and document dressing, incision, wound bed, drain sites and surrounding tissue  - Initiate Nutrition services consult and/or wound management as needed  Outcome: Progressing  Goal: Oral mucous membranes remain intact  Description  INTERVENTIONS  - Assess oral mucosa and hygiene practices  - Implement preventative oral hygiene regimen  - Implement oral medicated treatments as ordered  - Initiate Nutrition services referral as needed  Outcome: Progressing     Problem: HEMATOLOGIC - ADULT  Goal: Maintains hematologic stability  Description  INTERVENTIONS  - Assess for signs and symptoms of bleeding or hemorrhage  - Monitor labs  - Administer supportive blood products/factors as ordered and appropriate  Outcome: Progressing     Problem: MUSCULOSKELETAL - ADULT  Goal: Maintain or return mobility to safest level of function  Description  INTERVENTIONS:  - Assess patient's ability to carry out ADLs; assess patient's baseline for ADL function and identify physical deficits which impact ability to perform ADLs (bathing, care of mouth/teeth, toileting, grooming, dressing, etc )  - Assess/evaluate cause of self-care deficits   - Assess range of motion  - Assess patient's mobility; develop plan if impaired  - Assess patient's need for assistive devices and provide as appropriate  - Encourage maximum independence but intervene and supervise when necessary  - Involve family in performance of ADLs  - Assess for home care needs following discharge   - Request OT consult to assist with ADL evaluation and planning for discharge  - Provide patient education as appropriate  Outcome: Progressing  Goal: Maintain proper alignment of affected body part  Description  INTERVENTIONS:  - Support, maintain and protect limb and body alignment  - Provide pt/fam with appropriate education  Outcome: Progressing

## 2019-03-13 NOTE — DISCHARGE SUMMARY
Discharge- Stella Farmer 1940, 66 y o  female MRN: [id number]    Unit/Bed#: -01 Encounter: [id number]    Primary Care Provider: Paxton Allen DO   Date and time admitted to hospital: 3/7/2019  8:03 AM        * Severe sepsis ([facility] )  Assessment & Plan  · Improved  · Suspected source was aspiration pneumonia/colitis  · Patient completed course of antibiotics  · Vital stable  · Procalcitonin improved  · Follow up with PCP as outpatient    Acute on chronic respiratory failure with hypoxia and hypercapnia [facility] )  Assessment & Plan  · Appears to have improved as patient is saturating well on room air  · Secondary to aspiration pneumonia  · Titrated off of BiPAP  · Improving  · DNR/DNI    Hypokalemia  Assessment & Plan  · Replaced, continue to monitor as an outpatient  · Continue potassium 20 mEq b i d  For 1 week and repeat level next week    RUTH ANN (acute kidney injury) ([facility] )  Assessment & Plan  · Creatinine improving  · Nephrology input appreciated  · Follow up labs as outpatient monitor for improvement    Other dysphagia  Assessment & Plan  · Secondary to history of CVA  · Per swallow team recommend pureed diet and honey thick liquids  · Dr Ashwin Hernandez discussed the possibility of PEG tube during the previous admission and this admission as well, and family preferred not to have PEG tube placed  · Speech and swallow eval - t/c resuming pureed diet with honey thick liquids once more clinically improved  Pressure ulcer of left buttock, stage 2  Assessment & Plan  · Present on admission  · Wound care    Type 2 diabetes mellitus with complication, without long-term current use of insulin ([facility] )  Assessment & Plan  · Patient has had decreased p o   Intake  · Continue reduced dose of Lantus      Discharging Physician / Practitioner: Arabella Khanna MD  PCP: Paxton Allen DO  Admission Date:   Admission Orders (From admission, onward)    Ordered        03/07/19 0905  Inpatient Admission  Once     Order ID Start Status   [id number] 03/07/19 0906 Completed              Discharge Date: 03/13/19    Resolved Problems  Date Reviewed: 3/10/2019    None          Consultations During Hospital Stay:  · Nephrology, Critical Care    Procedures Performed:   · None    Significant Findings / Test Results:   · Aspiration pneumonia    Incidental Findings:   · None     Test Results Pending at Discharge (will require follow up): · None     Outpatient Tests Requested:  · BMP to follow-up kidney function and potassium level    Complications:  None    Reason for Admission:  Sepsis    Hospital Course:     Abraham Cain is a 66 y o  female patient who originally presented to the hospital on 3/7/2019 due to mental status  Have aspiration pneumonia and started on IV antibiotics  Patient has a history of multiple admissions  Patient has history of CVA and has difficulty swallowing  During previous admission discussion was had with the family regarding need for PEG tube and possible hospice care however family does not want PEG tube and does not want hospice care as well  Patient has overall poor clinical prognosis and poor condition at baseline  During this admission multiple conversations were had with the daughter regarding goals of care and prognosis  States she will think about hospice and potentially have any eval at the SNF  Patient completed course of antibiotics  Patient was seen by Nephrology and Critical Care during this admission  Patient did have acute kidney injury which did respond with IV fluids  Procalcitonin was initially elevated and did improve with IV antibiotics  Patient completed a course of 7 days antibiotics  I discussed discharge planning with the daughter over the phone today  I advised her that she should consider hospice as the patient does have a poor prognosis and is minimally verbal, nonambulatory at SNF, and is not taking in enough p o  Nutrition    Daughter understands all these findings and states that she will re-evaluate after talking to her brother and may proceed with hospice at SNF pending conversation with her family  Please see above list of diagnoses and related plan for additional information  Condition at Discharge: poor     Discharge Day Visit / Exam:     Subjective:  Unable to get a complete review of systems due to patient's mental status  No overnight events noted  Vitals: Blood Pressure: 131/87 (03/13/19 0720)  Pulse: 64 (03/13/19 0720)  Temperature: 98 8 °F (37 1 °C) (03/13/19 0720)  Temp Source: Temporal (03/13/19 0720)  Respirations: 18 (03/13/19 0720)  Height: 5' 6" (167 6 cm) (03/07/19 1030)  Weight - Scale: 82 6 kg (182 lb 1 6 oz) (03/12/19 0600)  SpO2: 97 % (03/13/19 0720)  Exam:   Physical Exam    Discussion with Family:  Spoke to daughter over the phone regarding discharge planning  Discharge instructions/Information to patient and family:   See after visit summary for information provided to patient and family  Provisions for Follow-Up Care:  See after visit summary for information related to follow-up care and any pertinent home health orders  Disposition:     Other [facility]:   · Not Applicable to this Patient - Not Applicable to this Patient    Planned Readmission: no     Discharge Statement:  I spent 35 minutes discharging the patient  This time was spent on the day of discharge  I had direct contact with the patient on the day of discharge  Greater than 50% of the total time was spent examining patient, answering all patient questions, arranging and discussing plan of care with patient as well as directly providing post-discharge instructions  Additional time then spent on discharge activities  Discharge Medications:  See after visit summary for reconciled discharge medications provided to patient and family        ** Please Note: This note has been constructed using a voice recognition system **

## 2019-03-13 NOTE — ASSESSMENT & PLAN NOTE
· Replaced, continue to monitor as an outpatient  · Continue potassium 20 mEq b i d   For 1 week and repeat level next week

## 2019-03-13 NOTE — SOCIAL WORK
Discussed the POC with the rounding team   Pt has been cleared by Dr Mariangel Eric to retrun to [facility]  Spoke to  Yamileth Guzman At [facility]  Pt will be transported via liter to [facility] at 2:30  Left a message with Daisha at [facility]  TC to daughter to review the IMM left a message  TC to pt son Ruy Mcfarlane who was made aware pt is returning to [facility]  Son states he is not the POA to contact the daughter  JAYLIN Samayoa aware of transport time  Unable to discuss anything with the pt d/t lethargy

## 2019-03-13 NOTE — ASSESSMENT & PLAN NOTE
· Improved  · Suspected source was aspiration pneumonia/colitis  · Patient completed course of antibiotics  · Vital stable  · Procalcitonin improved  · Follow up with PCP as outpatient

## 2019-03-13 NOTE — ASSESSMENT & PLAN NOTE
· Creatinine improving  · Nephrology input appreciated  · Follow up labs as outpatient monitor for improvement

## 2019-03-13 NOTE — ASSESSMENT & PLAN NOTE
· Secondary to history of CVA  · Per swallow team recommend pureed diet and honey thick liquids  · Dr Shiela Abebe discussed the possibility of PEG tube during the previous admission and this admission as well, and family preferred not to have PEG tube placed  · Speech and swallow eval - t/c resuming pureed diet with honey thick liquids once more clinically improved

## 2019-03-13 NOTE — NURSING NOTE
Pt awake, randomly opening eyes, focuses when called  No s/s of pain or distress  Bilat hands elevated on pillows due to edema  Ott in place, yellow slightly cloudy urine  Repositioning q 2 hours, pillows to prop  Call bell is within reach  Bed is locked and in lowest position  2 siderails up for safety   Will continue to monitor

## 2019-03-14 ENCOUNTER — EPISODE CHANGES (OUTPATIENT)
Dept: CASE MANAGEMENT | Facility: HOSPITAL | Age: 79
End: 2019-03-14

## 2019-03-14 LAB — BACTERIA UR CULT: ABNORMAL

## 2019-03-27 ENCOUNTER — PATIENT OUTREACH (OUTPATIENT)
Dept: CASE MANAGEMENT | Facility: OTHER | Age: 79
End: 2019-03-27

## 2019-03-27 NOTE — PROGRESS NOTES
3/27/18-pt returned to [facility] on 3/13/19  Sent email to Mary Hicks for bundle patient update

## 2019-03-29 ENCOUNTER — PATIENT OUTREACH (OUTPATIENT)
Dept: CASE MANAGEMENT | Facility: OTHER | Age: 79
End: 2019-03-29

## 2019-03-29 NOTE — PROGRESS NOTES
3/29/19-sent email to Gretel Durham [facility] requesting updates for bundle patient
